# Patient Record
Sex: MALE | Race: WHITE | NOT HISPANIC OR LATINO | URBAN - METROPOLITAN AREA
[De-identification: names, ages, dates, MRNs, and addresses within clinical notes are randomized per-mention and may not be internally consistent; named-entity substitution may affect disease eponyms.]

---

## 2017-06-06 ENCOUNTER — OUTPATIENT (OUTPATIENT)
Dept: OUTPATIENT SERVICES | Facility: HOSPITAL | Age: 82
LOS: 1 days | Discharge: HOME | End: 2017-06-06

## 2017-06-06 DIAGNOSIS — R07.9 CHEST PAIN, UNSPECIFIED: ICD-10-CM

## 2017-06-06 DIAGNOSIS — E78.5 HYPERLIPIDEMIA, UNSPECIFIED: ICD-10-CM

## 2017-06-06 DIAGNOSIS — I51.9 HEART DISEASE, UNSPECIFIED: ICD-10-CM

## 2017-06-06 DIAGNOSIS — I10 ESSENTIAL (PRIMARY) HYPERTENSION: ICD-10-CM

## 2017-06-28 DIAGNOSIS — I25.10 ATHEROSCLEROTIC HEART DISEASE OF NATIVE CORONARY ARTERY WITHOUT ANGINA PECTORIS: ICD-10-CM

## 2017-06-28 DIAGNOSIS — H50.15 ALTERNATING EXOTROPIA: ICD-10-CM

## 2017-06-28 DIAGNOSIS — E78.00 PURE HYPERCHOLESTEROLEMIA, UNSPECIFIED: ICD-10-CM

## 2017-06-28 DIAGNOSIS — E03.9 HYPOTHYROIDISM, UNSPECIFIED: ICD-10-CM

## 2017-06-28 DIAGNOSIS — H50.21 VERTICAL STRABISMUS, RIGHT EYE: ICD-10-CM

## 2017-06-28 DIAGNOSIS — I10 ESSENTIAL (PRIMARY) HYPERTENSION: ICD-10-CM

## 2018-05-31 PROBLEM — Z00.00 ENCOUNTER FOR PREVENTIVE HEALTH EXAMINATION: Status: ACTIVE | Noted: 2018-05-31

## 2018-06-13 ENCOUNTER — APPOINTMENT (OUTPATIENT)
Dept: UROLOGY | Facility: CLINIC | Age: 83
End: 2018-06-13
Payer: MEDICARE

## 2018-06-13 VITALS
WEIGHT: 185 LBS | DIASTOLIC BLOOD PRESSURE: 70 MMHG | HEART RATE: 55 BPM | HEIGHT: 68 IN | SYSTOLIC BLOOD PRESSURE: 141 MMHG | BODY MASS INDEX: 28.04 KG/M2

## 2018-06-13 DIAGNOSIS — E07.9 DISORDER OF THYROID, UNSPECIFIED: ICD-10-CM

## 2018-06-13 DIAGNOSIS — E78.00 PURE HYPERCHOLESTEROLEMIA, UNSPECIFIED: ICD-10-CM

## 2018-06-13 DIAGNOSIS — Z78.9 OTHER SPECIFIED HEALTH STATUS: ICD-10-CM

## 2018-06-13 DIAGNOSIS — I10 ESSENTIAL (PRIMARY) HYPERTENSION: ICD-10-CM

## 2018-06-13 DIAGNOSIS — Z95.5 PRESENCE OF CORONARY ANGIOPLASTY IMPLANT AND GRAFT: ICD-10-CM

## 2018-06-13 DIAGNOSIS — Z82.49 FAMILY HISTORY OF ISCHEMIC HEART DISEASE AND OTHER DISEASES OF THE CIRCULATORY SYSTEM: ICD-10-CM

## 2018-06-13 PROCEDURE — 99204 OFFICE O/P NEW MOD 45 MIN: CPT

## 2018-09-13 ENCOUNTER — APPOINTMENT (OUTPATIENT)
Dept: UROLOGY | Facility: CLINIC | Age: 83
End: 2018-09-13
Payer: MEDICARE

## 2018-09-13 VITALS
HEIGHT: 68 IN | WEIGHT: 185 LBS | SYSTOLIC BLOOD PRESSURE: 136 MMHG | BODY MASS INDEX: 28.04 KG/M2 | HEART RATE: 59 BPM | DIASTOLIC BLOOD PRESSURE: 62 MMHG

## 2018-09-13 PROBLEM — Z78.9 NON-SMOKER: Status: ACTIVE | Noted: 2018-06-13

## 2018-09-13 PROBLEM — Z82.49 FAMILY HISTORY OF CARDIAC DISORDER: Status: ACTIVE | Noted: 2018-06-13

## 2018-09-13 PROCEDURE — 99213 OFFICE O/P EST LOW 20 MIN: CPT

## 2019-01-10 ENCOUNTER — APPOINTMENT (OUTPATIENT)
Dept: UROLOGY | Facility: CLINIC | Age: 84
End: 2019-01-10
Payer: MEDICARE

## 2019-01-10 VITALS
HEIGHT: 68 IN | HEART RATE: 51 BPM | WEIGHT: 185 LBS | SYSTOLIC BLOOD PRESSURE: 146 MMHG | BODY MASS INDEX: 28.04 KG/M2 | DIASTOLIC BLOOD PRESSURE: 75 MMHG

## 2019-01-10 LAB
BILIRUB UR QL STRIP: NORMAL
CLARITY UR: CLEAR
COLLECTION METHOD: NORMAL
GLUCOSE UR-MCNC: NORMAL
HCG UR QL: NORMAL EU/DL
HGB UR QL STRIP.AUTO: NORMAL
KETONES UR-MCNC: NORMAL
LEUKOCYTE ESTERASE UR QL STRIP: NORMAL
NITRITE UR QL STRIP: NORMAL
PH UR STRIP: 5
PROT UR STRIP-MCNC: NORMAL
SP GR UR STRIP: 1015

## 2019-01-10 PROCEDURE — 99214 OFFICE O/P EST MOD 30 MIN: CPT

## 2019-01-10 PROCEDURE — 81003 URINALYSIS AUTO W/O SCOPE: CPT | Mod: QW

## 2019-01-10 NOTE — REASON FOR VISIT
[Follow-up Visit ___] : a follow-up visit  for [unfilled] [Family Member] : family member [Source: ______] : History obtained from [unfilled]

## 2019-01-10 NOTE — PHYSICAL EXAM
[General Appearance - Well Developed] : well developed [General Appearance - Well Nourished] : well nourished [Normal Appearance] : normal appearance [Well Groomed] : well groomed [General Appearance - In No Acute Distress] : no acute distress [Abdomen Soft] : soft [Abdomen Tenderness] : non-tender [Costovertebral Angle Tenderness] : no ~M costovertebral angle tenderness [Urethral Meatus] : meatus normal [Scrotum] : the scrotum was normal [Testes Tenderness] : no tenderness of the testes [Skin Color & Pigmentation] : normal skin color and pigmentation [Edema] : no peripheral edema [] : no respiratory distress [Respiration, Rhythm And Depth] : normal respiratory rhythm and effort [Exaggerated Use Of Accessory Muscles For Inspiration] : no accessory muscle use [Oriented To Time, Place, And Person] : oriented to person, place, and time [Affect] : the affect was normal [Mood] : the mood was normal [Not Anxious] : not anxious [Normal Station and Gait] : the gait and station were normal for the patient's age [No Focal Deficits] : no focal deficits [No Palpable Adenopathy] : no palpable adenopathy

## 2019-01-10 NOTE — ASSESSMENT
[FreeTextEntry1] : 1. Prostate cancer T1C, john 6, diagnosed in 1996\par 2. biochemical recurrence on continuous ADT since 2006- LEXY\par \par -pt scheduled to go away to [place] this month and return in May- he will receive his lupron injection at that time and rto with PSA\par -we will decide at that time to cycle off\par

## 2019-01-10 NOTE — HISTORY OF PRESENT ILLNESS
[Nocturia] : nocturia [None] : None [FreeTextEntry1] : 86 y.o male with h/o prostate cancer ,here with daughter .\par dx'ed 1996- T1C, john 6, left lobe, PSA 9.0 at diagnosis\par pt treated with EBRT\par had biochemical recurrence in 2006 and started on Lupron depot 4 month- previously followed by Dr. Sanchez- per pts daughter "scans" were done and it did not show metastatic dz\par s/p  injection last week [facility]\par states he had a dexascan last year and gets annual cmp with Dr. Kay\par voids well/ no new complaints -- all data reviewed \par plays golf\par appetite good\par PSA \par           0.7  12/2018 \par           0.5  test 4-  9/5/18\par           0.6   12/2017 [Urinary Urgency] : no urinary urgency [Urinary Frequency] : no urinary frequency [Weak Stream] : no weak stream [Dysuria] : no dysuria [Hematuria - Gross] : no gross hematuria [Abdominal Pain] : no abdominal pain [Flank Pain] : no flank pain [Fever] : no fever [Anorexia] : no anorexia

## 2019-05-15 ENCOUNTER — APPOINTMENT (OUTPATIENT)
Dept: UROLOGY | Facility: CLINIC | Age: 84
End: 2019-05-15
Payer: MEDICARE

## 2019-05-15 PROCEDURE — 99213 OFFICE O/P EST LOW 20 MIN: CPT

## 2019-06-27 ENCOUNTER — INPATIENT (INPATIENT)
Facility: HOSPITAL | Age: 84
LOS: 7 days | Discharge: HOME HEALTH | End: 2019-07-05
Attending: THORACIC SURGERY (CARDIOTHORACIC VASCULAR SURGERY) | Admitting: THORACIC SURGERY (CARDIOTHORACIC VASCULAR SURGERY)
Payer: MEDICARE

## 2019-06-27 VITALS — HEIGHT: 67.99 IN | OXYGEN SATURATION: 99 % | WEIGHT: 184.97 LBS

## 2019-06-27 LAB
ABO RH CONFIRMATION: SIGNIFICANT CHANGE UP
ALBUMIN SERPL ELPH-MCNC: 3.2 G/DL — LOW (ref 3.5–5.2)
ALP SERPL-CCNC: 83 U/L — SIGNIFICANT CHANGE UP (ref 30–115)
ALT FLD-CCNC: 13 U/L — SIGNIFICANT CHANGE UP (ref 0–41)
ANION GAP SERPL CALC-SCNC: 10 MMOL/L — SIGNIFICANT CHANGE UP (ref 7–14)
APPEARANCE UR: CLEAR — SIGNIFICANT CHANGE UP
APTT BLD: 30.1 SEC — SIGNIFICANT CHANGE UP (ref 27–39.2)
APTT BLD: 62.8 SEC — HIGH (ref 27–39.2)
APTT BLD: >200 SEC — CRITICAL HIGH (ref 27–39.2)
AST SERPL-CCNC: 17 U/L — SIGNIFICANT CHANGE UP (ref 0–41)
BACTERIA # UR AUTO: ABNORMAL /HPF
BILIRUB SERPL-MCNC: 0.6 MG/DL — SIGNIFICANT CHANGE UP (ref 0.2–1.2)
BILIRUB UR-MCNC: NEGATIVE — SIGNIFICANT CHANGE UP
BLD GP AB SCN SERPL QL: SIGNIFICANT CHANGE UP
BUN SERPL-MCNC: 17 MG/DL — SIGNIFICANT CHANGE UP (ref 10–20)
CALCIUM SERPL-MCNC: 8.4 MG/DL — LOW (ref 8.5–10.1)
CHLORIDE SERPL-SCNC: 105 MMOL/L — SIGNIFICANT CHANGE UP (ref 98–110)
CK MB CFR SERPL CALC: 2.1 NG/ML — SIGNIFICANT CHANGE UP (ref 0.6–6.3)
CK SERPL-CCNC: 114 U/L — SIGNIFICANT CHANGE UP (ref 0–225)
CO2 SERPL-SCNC: 25 MMOL/L — SIGNIFICANT CHANGE UP (ref 17–32)
COLOR SPEC: YELLOW — SIGNIFICANT CHANGE UP
CREAT SERPL-MCNC: 1 MG/DL — SIGNIFICANT CHANGE UP (ref 0.7–1.5)
DIFF PNL FLD: ABNORMAL
ESTIMATED AVERAGE GLUCOSE: 134 MG/DL — HIGH (ref 68–114)
GLUCOSE SERPL-MCNC: 122 MG/DL — HIGH (ref 70–99)
GLUCOSE UR QL: NEGATIVE MG/DL — SIGNIFICANT CHANGE UP
HBA1C BLD-MCNC: 6.3 % — HIGH (ref 4–5.6)
HCT VFR BLD CALC: 32.8 % — LOW (ref 42–52)
HGB BLD-MCNC: 10.7 G/DL — LOW (ref 14–18)
INR BLD: 1.11 RATIO — SIGNIFICANT CHANGE UP (ref 0.65–1.3)
KETONES UR-MCNC: NEGATIVE — SIGNIFICANT CHANGE UP
LEUKOCYTE ESTERASE UR-ACNC: ABNORMAL
MCHC RBC-ENTMCNC: 29.6 PG — SIGNIFICANT CHANGE UP (ref 27–31)
MCHC RBC-ENTMCNC: 32.6 G/DL — SIGNIFICANT CHANGE UP (ref 32–37)
MCV RBC AUTO: 90.6 FL — SIGNIFICANT CHANGE UP (ref 80–94)
MRSA PCR RESULT.: NEGATIVE — SIGNIFICANT CHANGE UP
NITRITE UR-MCNC: NEGATIVE — SIGNIFICANT CHANGE UP
NRBC # BLD: 0 /100 WBCS — SIGNIFICANT CHANGE UP (ref 0–0)
NT-PROBNP SERPL-SCNC: 665 PG/ML — HIGH (ref 0–300)
PH UR: 7 — SIGNIFICANT CHANGE UP (ref 5–8)
PLATELET # BLD AUTO: 140 K/UL — SIGNIFICANT CHANGE UP (ref 130–400)
POTASSIUM SERPL-MCNC: 4.1 MMOL/L — SIGNIFICANT CHANGE UP (ref 3.5–5)
POTASSIUM SERPL-SCNC: 4.1 MMOL/L — SIGNIFICANT CHANGE UP (ref 3.5–5)
PROT SERPL-MCNC: 5.6 G/DL — LOW (ref 6–8)
PROT UR-MCNC: ABNORMAL MG/DL
PROTHROM AB SERPL-ACNC: 12.8 SEC — SIGNIFICANT CHANGE UP (ref 9.95–12.87)
RBC # BLD: 3.62 M/UL — LOW (ref 4.7–6.1)
RBC # FLD: 14.1 % — SIGNIFICANT CHANGE UP (ref 11.5–14.5)
RBC CASTS # UR COMP ASSIST: >50 /HPF
SODIUM SERPL-SCNC: 140 MMOL/L — SIGNIFICANT CHANGE UP (ref 135–146)
SP GR SPEC: 1.02 — SIGNIFICANT CHANGE UP (ref 1.01–1.03)
TROPONIN T SERPL-MCNC: <0.01 NG/ML — SIGNIFICANT CHANGE UP
TROPONIN T SERPL-MCNC: <0.01 NG/ML — SIGNIFICANT CHANGE UP
UROBILINOGEN FLD QL: 0.2 MG/DL — SIGNIFICANT CHANGE UP (ref 0.2–0.2)
WBC # BLD: 6.66 K/UL — SIGNIFICANT CHANGE UP (ref 4.8–10.8)
WBC # FLD AUTO: 6.66 K/UL — SIGNIFICANT CHANGE UP (ref 4.8–10.8)
WBC UR QL: ABNORMAL /HPF

## 2019-06-27 PROCEDURE — 93010 ELECTROCARDIOGRAM REPORT: CPT

## 2019-06-27 PROCEDURE — 93306 TTE W/DOPPLER COMPLETE: CPT | Mod: 26

## 2019-06-27 PROCEDURE — 93880 EXTRACRANIAL BILAT STUDY: CPT | Mod: 26

## 2019-06-27 PROCEDURE — 99222 1ST HOSP IP/OBS MODERATE 55: CPT | Mod: 57

## 2019-06-27 PROCEDURE — 33967 INSERT I-AORT PERCUT DEVICE: CPT

## 2019-06-27 PROCEDURE — 93458 L HRT ARTERY/VENTRICLE ANGIO: CPT | Mod: 26

## 2019-06-27 RX ORDER — CHLORHEXIDINE GLUCONATE 213 G/1000ML
15 SOLUTION TOPICAL ONCE
Refills: 0 | Status: COMPLETED | OUTPATIENT
Start: 2019-06-27 | End: 2019-06-28

## 2019-06-27 RX ORDER — OXYCODONE HYDROCHLORIDE 5 MG/1
5 TABLET ORAL EVERY 6 HOURS
Refills: 0 | Status: DISCONTINUED | OUTPATIENT
Start: 2019-06-27 | End: 2019-06-28

## 2019-06-27 RX ORDER — BACLOFEN 100 %
5 POWDER (GRAM) MISCELLANEOUS ONCE
Refills: 0 | Status: COMPLETED | OUTPATIENT
Start: 2019-06-27 | End: 2019-06-27

## 2019-06-27 RX ORDER — HEPARIN SODIUM 5000 [USP'U]/ML
1000 INJECTION INTRAVENOUS; SUBCUTANEOUS
Qty: 25000 | Refills: 0 | Status: DISCONTINUED | OUTPATIENT
Start: 2019-06-27 | End: 2019-06-28

## 2019-06-27 RX ORDER — NITROGLYCERIN 6.5 MG
5 CAPSULE, EXTENDED RELEASE ORAL
Qty: 50 | Refills: 0 | Status: DISCONTINUED | OUTPATIENT
Start: 2019-06-27 | End: 2019-06-28

## 2019-06-27 RX ORDER — ATORVASTATIN CALCIUM 80 MG/1
80 TABLET, FILM COATED ORAL AT BEDTIME
Refills: 0 | Status: DISCONTINUED | OUTPATIENT
Start: 2019-06-27 | End: 2019-06-28

## 2019-06-27 RX ORDER — ACETAMINOPHEN 500 MG
650 TABLET ORAL EVERY 6 HOURS
Refills: 0 | Status: DISCONTINUED | OUTPATIENT
Start: 2019-06-27 | End: 2019-06-28

## 2019-06-27 RX ORDER — SODIUM CHLORIDE 9 MG/ML
3 INJECTION INTRAMUSCULAR; INTRAVENOUS; SUBCUTANEOUS EVERY 8 HOURS
Refills: 0 | Status: DISCONTINUED | OUTPATIENT
Start: 2019-06-27 | End: 2019-06-28

## 2019-06-27 RX ORDER — METOPROLOL TARTRATE 50 MG
12.5 TABLET ORAL EVERY 12 HOURS
Refills: 0 | Status: DISCONTINUED | OUTPATIENT
Start: 2019-06-27 | End: 2019-06-28

## 2019-06-27 RX ORDER — ONDANSETRON 8 MG/1
4 TABLET, FILM COATED ORAL ONCE
Refills: 0 | Status: COMPLETED | OUTPATIENT
Start: 2019-06-27 | End: 2019-06-27

## 2019-06-27 RX ORDER — CHLORHEXIDINE GLUCONATE 213 G/1000ML
1 SOLUTION TOPICAL ONCE
Refills: 0 | Status: COMPLETED | OUTPATIENT
Start: 2019-06-27 | End: 2019-06-27

## 2019-06-27 RX ORDER — LEVOTHYROXINE SODIUM 125 MCG
50 TABLET ORAL DAILY
Refills: 0 | Status: DISCONTINUED | OUTPATIENT
Start: 2019-06-27 | End: 2019-06-28

## 2019-06-27 RX ORDER — SODIUM CHLORIDE 9 MG/ML
1000 INJECTION INTRAMUSCULAR; INTRAVENOUS; SUBCUTANEOUS
Refills: 0 | Status: DISCONTINUED | OUTPATIENT
Start: 2019-06-27 | End: 2019-06-29

## 2019-06-27 RX ORDER — PANTOPRAZOLE SODIUM 20 MG/1
40 TABLET, DELAYED RELEASE ORAL
Refills: 0 | Status: DISCONTINUED | OUTPATIENT
Start: 2019-06-27 | End: 2019-06-28

## 2019-06-27 RX ORDER — ASPIRIN/CALCIUM CARB/MAGNESIUM 324 MG
81 TABLET ORAL DAILY
Refills: 0 | Status: DISCONTINUED | OUTPATIENT
Start: 2019-06-27 | End: 2019-06-28

## 2019-06-27 RX ADMIN — SODIUM CHLORIDE 3 MILLILITER(S): 9 INJECTION INTRAMUSCULAR; INTRAVENOUS; SUBCUTANEOUS at 18:53

## 2019-06-27 RX ADMIN — OXYCODONE HYDROCHLORIDE 5 MILLIGRAM(S): 5 TABLET ORAL at 18:49

## 2019-06-27 RX ADMIN — Medication 5 MILLIGRAM(S): at 14:33

## 2019-06-27 RX ADMIN — Medication 12.5 MILLIGRAM(S): at 18:36

## 2019-06-27 RX ADMIN — OXYCODONE HYDROCHLORIDE 5 MILLIGRAM(S): 5 TABLET ORAL at 18:10

## 2019-06-27 RX ADMIN — Medication 650 MILLIGRAM(S): at 21:15

## 2019-06-27 RX ADMIN — SODIUM CHLORIDE 3 MILLILITER(S): 9 INJECTION INTRAMUSCULAR; INTRAVENOUS; SUBCUTANEOUS at 22:19

## 2019-06-27 RX ADMIN — Medication 650 MILLIGRAM(S): at 20:46

## 2019-06-27 RX ADMIN — ATORVASTATIN CALCIUM 80 MILLIGRAM(S): 80 TABLET, FILM COATED ORAL at 22:43

## 2019-06-27 RX ADMIN — CHLORHEXIDINE GLUCONATE 1 APPLICATION(S): 213 SOLUTION TOPICAL at 21:00

## 2019-06-27 RX ADMIN — Medication 1.5 MICROGRAM(S)/MIN: at 08:14

## 2019-06-27 RX ADMIN — ONDANSETRON 4 MILLIGRAM(S): 8 TABLET, FILM COATED ORAL at 18:45

## 2019-06-27 NOTE — PROGRESS NOTE ADULT - SUBJECTIVE AND OBJECTIVE BOX
Cardiac Surgery Pre-op Note: PLANNED OPERATIVE PROCEDURE(s):    CABG            HD # 1                      SURGEON(s):LEE Edmondson       Consult requesting by: Dr. Conteh  PMD: Dr. Kay  Cardiologist: Dr. Shaw   Urology: Dr. Carlisle    HISTORY OF PRESENT ILLNESS: 87 year old male nonsmoker with PMHx of CAD (MI 30ys ago), Prostate CA, HTN, HLD and hypothyroidism. Patient has been c/o progressively worsening SOB/FONTENOT associated with chest discomfort now with minimal exertion relieved by sublingual nitro which patient reports has been taking more frequently since January. Patient now presented for elective LHC. During procedure patient began c/o of chest pain and was controlled with nitroglycerine drip. Patient was found to have severe Left main 3vCAD with preserved left ventricular function and IABP was inserted for hemodynamic instability.     PAST MEDICAL & SURGICAL HISTORY:  Prostate cancer  Old myocardial infarction  Hypothyroid  Essential hypertension    SUBJECTIVE ASSESSMENT: 87y Male patient seen and examined at bedside. Patient currently hemodynamically table w/IABP in place.    Physical Exam:    Vital Signs Last 24 Hrs  T(F): Afebrile  HR: 60  BP: 118/80  RR: 14  SpO2: 99% 2l NC     RUE BP:                        LUE BP:    General: NAD; A&Ox3  Cardiac: S1/S2, RRR, no murmur, no rubs  Lungs: unlabored respirations, w/+ Bibasilar crackles, no wheeze, no rales, no crackles  Abdomen: Soft/NT/ND; positive bowel sounds x 4  Extremities: + venous stasis skin changes to b/l shins w/active venous stasis ulcer to anterior aspect of Left shin w/ 1-2+ pitting edema to b/l LEs. No evidence of cyanosis or deformity. 1+ b/l DP     5meter walk test: T1:      s/T2:     s/T3:     s: unable to assess 2/2 IABP          LABS:                        12.5<L>  6.87  )-----------( 179      ( 27 Jun 2019 07:02 )             38.8<L>    06-27    140  |  105  |  17  ----------------------------<  122<H>  4.1   |  25  |  1.0    Ca    8.4<L>      27 Jun 2019 12:28    TPro  5.6<L> [6.0 - 8.0]  /  Alb  3.2<L> [3.5 - 5.2]  /  TBili  0.6 [0.2 - 1.2]  /  DBili  x   /  AST  17 [0 - 41]  /  ALT  13 [0 - 41]  /  AlkPhos  83 [30 - 115]  06-27    PT/INR - ( 27 Jun 2019 12:37 )   PT: ;   INR: 1.11 ratio       PTT - ( 27 Jun 2019 12:37 )  PTT:62.8 sec    Blood Type: Type + Screen (06.27.19 @ 12:37)    ABO RH Interpretation: A POS    HGB A1C: Pending    Pro-BNP: Serum Pro-Brain Natriuretic Peptide: 665 pg/mL (06-27 @ 12:28)    Thyroid Panel: Pending    Urinalysis: Pending     MRSA: Pending     RADIOLOGY & ADDITIONAL TESTS:  CXR:     EKG: < from: 12 Lead ECG (06.27.19 @ 07:05) >  Ventricular Rate 72 BPM  Atrial Rate 72 BPM  P-R Interval 167 ms  QRS Duration 133 ms  Q-T Interval 446 ms  QTC Calculation(Bezet) 488 ms  P Axis 64 degrees  R Axis 83 degrees  T Axis 20 degrees  Diagnosis Line Normal sinus rhythmwith occasional Premature supraventricular complexes  Right bundle branch block  T wave abnormality, consider inferolateral ischemia  Abnormal ECG  Confirmed by Oscar Conteh ([id number]) on 6/27/2019 1:17:21 PM  < end of copied text >    Carotid Duplex:  < from: [facility] Duplex Baldo, Nilesh (06.27.19 @ 13:39) >  Impression:  40-59 % stenosis of the right internal carotid artery.  40-59 % stenosis of the left internal carotid artery.  < end of copied text >    PFT's: PENDING    Echocardiogram: < from: Transthoracic Echocardiogram (06.27.19 @ 13:23) >  Summary:   1. LV Ejection Fraction by Aranda's Method with a biplane EF of 68 %.   2. Mildly increased LV wall thickness.   3. Spectral Doppler shows pseudonormal pattern of left ventricular myocardial filling (Grade II diastolic dysfunction).   4. Thickening and calcification of the anterior and posterior mitral valve leaflets.   5. Sclerotic aortic valve with decreased opening.  < end of copied text >    Cardiac catheterization: Cardiac Cath: Left Heart Catheterization:  LVEDP: WNL  3 V CAD  Right radial 6 Fr sheath hemostasis with radial D stat  Right femoral 8 Fr IABP using micropuncture  Right dominant circulation                    Left main 70% ostial lesion  LAD:  Svere diffuse disease with total occlusion,                       Left Circumflex: Heavily calcified, severe diffuse disease  Right Coronary Artery:  of mid RCA  IMPLANTS:  8 Fr IABP for severe 3 V CAD and unstable angina      Allergies  No Known Allergies  Intolerances      MEDICATIONS  (STANDING):  aspirin enteric coated 81 milliGRAM(s) Oral daily  atorvastatin 80 milliGRAM(s) Oral at bedtime  chlorhexidine 0.12% Liquid 15 milliLiter(s) Swish and Spit once  chlorhexidine 4% Liquid 1 Application(s) Topical once  levothyroxine 50 MICROGram(s) Oral daily  metoprolol tartrate 12.5 milliGRAM(s) Oral every 12 hours  nitroglycerin  Infusion 5 MICROgram(s)/Min (1.5 mL/Hr) IV Continuous <Continuous>  pantoprazole    Tablet 40 milliGRAM(s) Oral before breakfast  sodium chloride 0.9% lock flush 3 milliLiter(s) IV Push every 8 hours    MEDICATIONS  (PRN):  oxyCODONE    IR 5 milliGRAM(s) Oral every 6 hours PRN Moderate Pain (4 - 6)    Home Medications:  aspirin 81 mg oral tablet: 1 tab(s) orally once a day (27 Jun 2019 07:30)  atorvastatin 20 mg oral tablet: 1 tab(s) orally once a day (27 Jun 2019 07:30)  isosorbide mononitrate 30 mg oral tablet, extended release: 1 tab(s) orally once a day (in the morning) (27 Jun 2019 07:30)  nitroglycerin 0.4 mg sublingual tablet: pt took in cath lab bathroom in cath lab (27 Jun 2019 07:30)  propranolol 60 mg oral capsule, extended release: 1 cap(s) orally once a day (27 Jun 2019 07:30)  Synthroid 50 mcg (0.05 mg) oral tablet: 1 tab(s) orally once a day (27 Jun 2019 07:30)      Pre-op ACEi/ARB/CCB held 24 hours prior to planned procedure: [] Yes, [x] NO: indication:  Pre-Op Beta-Blockers: [x]Yes, []No: contraindication:  Pre-Op Aspirin: [x]Yes,  []No: contraindication: [] Held for Pre-op cardiac valve surgery with no CAD  Pre-Op Statin: [x]Yes, []No: contraindication:      STS Score:    Cardiac Surgery Risk Factors  CVA and/or carotid/cerebrovascular disease. Yes  No  Explain if Yes  Aortoiliac disease Yes  No  Explain if Yes  Previous MI Yes: </=30 years ago in PMHx  Previous Cardiac Surgery Yes  No  Explain if Yes  Hemodynamics-Unstable or Shock Yes: IABP in place No  Explain if Yes  Diabetes Yes  No  Explain if Yes  Hepatic Failure Yes  No  Explain if Yes  Renal failure and/or dialysis Yes  No  Explain if Yes  Heart failure: Acute diastolic heart failure: SOB w/+Bibasilar crackles  COPD Yes  No  Explain if Yes  Immune System Deficiency Yes  No  Explain if Yes  Malignant Ventricular Arrhythmia Yes  No  Explain if Yes  PVD: Yes: venous stasis and venous stasis ulcers of b/l LEs    Assessment/Plan:  87y Male Pre-op for   - Case and plan discussed with CTU Intensivist and CT Surgeon - Dr. Anderson/Casandra/Debo   - Continue CTU supportive care and ongoing plan of care as per continuing CTU rounds.    - Continue DVT/GI prophylaxis  - Incentive Spirometry 10 times an hour  - Continue to advance physical activity as tolerated and continue PT/OT as directed  1. CAD: Continue ASA, statin, BB  2. HTN:   3. A. Fib:   4. COPD/Hypoxia:   5. DM/Glucose Control:     Pt has AICD/PPM [ ] Yes  [x ] No             Brand Name:  Pre-op Beta Blocker ordered within 24 hrs of surgery (CABG ONLY)?  [x ] Yes  [ ] No  If not, Why?  Type & Cross  [ ] Yes  [ ] No  NPO after Midnight [x ] Yes  [ ] No  Pre-op ABX ordered, to be taped on chart:  [ x] Yes  [ ] No     Hibiclens/Peridex ordered [x ] Yes  [ ] No  Intraop on Hold: PRBCs, CXR, JOSUÉ [x ]   Consent obtained  [x ] Yes  [ ] No Cardiac Surgery Pre-op Note: PLANNED OPERATIVE PROCEDURE(s):    CABG            HD # 1                      SURGEON(s):LEE Edmondson     Consult requesting by: Dr. Conteh  PMD: Dr. Kay  Cardiologist: Dr. Shaw   Urology: Dr. Carlisle    HISTORY OF PRESENT ILLNESS: 87 year old male nonsmoker with PMHx of CAD (MI 30ys ago), Prostate CA, HTN, HLD and hypothyroidism. Patient has been c/o progressively worsening SOB/FONTENOT associated with chest discomfort now with minimal exertion relieved by sublingual nitro which patient reports has been taking more frequently since January. Patient now presented for elective LHC. During procedure patient began c/o of chest pain and was controlled with nitroglycerine drip. Patient was found to have severe Left main 3vCAD with preserved left ventricular function and IABP was inserted for hemodynamic instability.     PAST MEDICAL & SURGICAL HISTORY:  Prostate cancer  Old myocardial infarction  Hypothyroid  Essential hypertension    SUBJECTIVE ASSESSMENT: 87y Male patient seen and examined at bedside. Patient currently hemodynamically table w/IABP in place.    Physical Exam:    Vital Signs Last 24 Hrs  T(F): Afebrile  HR: 60  BP: 118/80  RR: 14  SpO2: 99% 2l NC     RUE BP: 150/63                       LUE BP: 153/60    General: NAD; A&Ox3  Cardiac: S1/S2, RRR, no murmur, no rubs  Lungs: unlabored respirations, w/+ Bibasilar crackles, no wheeze, no rales, no crackles  Abdomen: Soft/NT/ND; positive bowel sounds x 4  Extremities: + venous stasis skin changes to b/l shins w/active venous stasis ulcer to anterior aspect of Left shin w/ 1-2+ pitting edema to b/l LEs. No evidence of cyanosis or deformity. 1+ b/l DP     5meter walk test: T1:      s/T2:     s/T3:     s: unable to assess 2/2 IABP          LABS:                        12.5<L>  6.87  )-----------( 179      ( 27 Jun 2019 07:02 )             38.8<L>    06-27    140  |  105  |  17  ----------------------------<  122<H>  4.1   |  25  |  1.0    Ca    8.4<L>      27 Jun 2019 12:28    TPro  5.6<L> [6.0 - 8.0]  /  Alb  3.2<L> [3.5 - 5.2]  /  TBili  0.6 [0.2 - 1.2]  /  DBili  x   /  AST  17 [0 - 41]  /  ALT  13 [0 - 41]  /  AlkPhos  83 [30 - 115]  06-27    PT/INR - ( 27 Jun 2019 12:37 )   PT: ;   INR: 1.11 ratio       PTT - ( 27 Jun 2019 12:37 )  PTT:62.8 sec    Blood Type: Type + Screen (06.27.19 @ 12:37)    ABO RH Interpretation: A POS    HGB A1C: Pending    Pro-BNP: Serum Pro-Brain Natriuretic Peptide: 665 pg/mL (06-27 @ 12:28)    Thyroid Panel: Pending    Urinalysis: Pending     MRSA: Pending     RADIOLOGY & ADDITIONAL TESTS:  CXR:     EKG: < from: 12 Lead ECG (06.27.19 @ 07:05) >  Ventricular Rate 72 BPM  Atrial Rate 72 BPM  P-R Interval 167 ms  QRS Duration 133 ms  Q-T Interval 446 ms  QTC Calculation(Bezet) 488 ms  P Axis 64 degrees  R Axis 83 degrees  T Axis 20 degrees  Diagnosis Line Normal sinus rhythmwith occasional Premature supraventricular complexes  Right bundle branch block  T wave abnormality, consider inferolateral ischemia  Abnormal ECG  Confirmed by Oscar Conteh ([id number]) on 6/27/2019 1:17:21 PM  < end of copied text >    Carotid Duplex:  < from: [facility] Duplex Carotid, Bilat (06.27.19 @ 13:39) >  Impression:  40-59 % stenosis of the right internal carotid artery.  40-59 % stenosis of the left internal carotid artery.  < end of copied text >    PFT's: PENDING    Echocardiogram: < from: Transthoracic Echocardiogram (06.27.19 @ 13:23) >  Summary:   1. LV Ejection Fraction by Aranda's Method with a biplane EF of 68 %.   2. Mildly increased LV wall thickness.   3. Spectral Doppler shows pseudonormal pattern of left ventricular myocardial filling (Grade II diastolic dysfunction).   4. Thickening and calcification of the anterior and posterior mitral valve leaflets.   5. Sclerotic aortic valve with decreased opening.  < end of copied text >    Cardiac catheterization: Cardiac Cath: Left Heart Catheterization:  LVEDP: WNL  3 V CAD  Right radial 6 Fr sheath hemostasis with radial D stat  Right femoral 8 Fr IABP using micropuncture  Right dominant circulation                    Left main 70% ostial lesion  LAD:  Svere diffuse disease with total occlusion,                       Left Circumflex: Heavily calcified, severe diffuse disease  Right Coronary Artery:  of mid RCA  IMPLANTS:  8 Fr IABP for severe 3 V CAD and unstable angina      Allergies  No Known Allergies  Intolerances      MEDICATIONS  (STANDING):  aspirin enteric coated 81 milliGRAM(s) Oral daily  atorvastatin 80 milliGRAM(s) Oral at bedtime  chlorhexidine 0.12% Liquid 15 milliLiter(s) Swish and Spit once  chlorhexidine 4% Liquid 1 Application(s) Topical once  levothyroxine 50 MICROGram(s) Oral daily  metoprolol tartrate 12.5 milliGRAM(s) Oral every 12 hours  nitroglycerin  Infusion 5 MICROgram(s)/Min (1.5 mL/Hr) IV Continuous <Continuous>  pantoprazole    Tablet 40 milliGRAM(s) Oral before breakfast  sodium chloride 0.9% lock flush 3 milliLiter(s) IV Push every 8 hours    MEDICATIONS  (PRN):  oxyCODONE    IR 5 milliGRAM(s) Oral every 6 hours PRN Moderate Pain (4 - 6)    Home Medications:  aspirin 81 mg oral tablet: 1 tab(s) orally once a day (27 Jun 2019 07:30)  atorvastatin 20 mg oral tablet: 1 tab(s) orally once a day (27 Jun 2019 07:30)  isosorbide mononitrate 30 mg oral tablet, extended release: 1 tab(s) orally once a day (in the morning) (27 Jun 2019 07:30)  nitroglycerin 0.4 mg sublingual tablet: pt took in cath lab bathroom in cath lab (27 Jun 2019 07:30)  propranolol 60 mg oral capsule, extended release: 1 cap(s) orally once a day (27 Jun 2019 07:30)  Synthroid 50 mcg (0.05 mg) oral tablet: 1 tab(s) orally once a day (27 Jun 2019 07:30)      Pre-op ACEi/ARB/CCB held 24 hours prior to planned procedure: [] Yes, [x] NO: indication:  Pre-Op Beta-Blockers: [x]Yes, []No: contraindication:  Pre-Op Aspirin: [x]Yes,  []No: contraindication: [] Held for Pre-op cardiac valve surgery with no CAD  Pre-Op Statin: [x]Yes, []No: contraindication:      STS Score: Isolated CAB   Risk of Mortality: 4.235%  Renal Failure: 2.746%  Permanent Stroke: 0.909%  Prolonged Ventilation: 21.812%  DSW Infection: 0.204%  Reoperation: 4.092%  Morbidity or Mortality: 29.564%  Short Length of Stay: 18.513%  Long Length of Stay: 8.893%      Cardiac Surgery Risk Factors  CVA and/or carotid/cerebrovascular disease. Yes  No  Explain if Yes  Aortoiliac disease Yes  No  Explain if Yes  Previous MI Yes: </=30 years ago in PMHx  Previous Cardiac Surgery Yes  No  Explain if Yes  Hemodynamics-Unstable or Shock Yes: IABP in place No  Explain if Yes  Diabetes Yes  No  Explain if Yes  Hepatic Failure Yes  No  Explain if Yes  Renal failure and/or dialysis Yes  No  Explain if Yes  Heart failure: Acute on chronic diastolic heart failure: SOB w/+Bibasilar crackles  COPD Yes  No  Explain if Yes  Immune System Deficiency Yes  No  Explain if Yes  Malignant Ventricular Arrhythmia Yes  No  Explain if Yes  PVD: Yes: venous stasis and venous stasis ulcers of b/l LEs    Assessment/Plan:  87y Male Pre-op for   - Case and plan discussed with CTU Intensivist and CT Surgeon - Dr. Edmondson   - Continue CTU supportive care and ongoing plan of care as per continuing CTU rounds.    - Continue DVT/GI prophylaxis  - Incentive Spirometry 10 times an hour  - Continue to advance physical activity as tolerated and continue PT/OT as directed  1. CAD: Continue ASA, statin, BB    Pt has AICD/PPM [ ] Yes  [x ] No             Brand Name:  Pre-op Beta Blocker ordered within 24 hrs of surgery ([facility] ONLY)?  [x ] Yes  [ ] No  If not, Why?  Type & Cross  [x ] Yes  [ ] No  NPO after Midnight [x ] Yes  [ ] No  Pre-op ABX ordered, to be taped on chart:  [ x] Yes  [ ] No     Hibiclens/Peridex ordered [x ] Yes  [ ] No  Intraop on Hold: PRBCs, CXR, JOSUÉ [x ]   Consent obtained  [x ] Yes  [ ] No

## 2019-06-27 NOTE — CONSULT NOTE ADULT - SUBJECTIVE AND OBJECTIVE BOX
Surgeon: Dr. Edmondson    Consult requesting by: Dr. Conteh  PMD: Dr. Kay  Cardiologist: Dr. Shaw   Urology: Dr. Carlisle      HISTORY OF PRESENT ILLNESS: 87 year old male nonsmoker with PMHx of CAD (MI 30ys ago), Prostate CA, HTN, HLD and hypothyroidism. Patient has been c/o progressively worsening FONTENOT associated with chest discomfort now with minimal exertion relieved by sublingual nitro which patient reports has been taking more frequently since January. Patient now presented for elective LHC. During procedure patient began c/o of chest pain and was controlled with nitroglycerine drip. Patient was found to have severe 3vCAD with preserved left ventricular function and IABP was inserted for hemodynamic instability.       NYHA functional class    [ ] Class I (no limitation) [ ] Class II (slight limitation) [x] Class III (marked limitation) [ ] Class IV (symptoms at rest)    PAST MEDICAL & SURGICAL HISTORY:  Prostate cancer  Old myocardial infarction  Hypothyroid  Essential hypertension      MEDICATIONS  (STANDING):  aspirin enteric coated 81 milliGRAM(s) Oral daily  atorvastatin 80 milliGRAM(s) Oral at bedtime  chlorhexidine 0.12% Liquid 15 milliLiter(s) Swish and Spit once  chlorhexidine 4% Liquid 1 Application(s) Topical once  levothyroxine 50 MICROGram(s) Oral daily  metoprolol tartrate 12.5 milliGRAM(s) Oral every 12 hours  nitroglycerin  Infusion 5 MICROgram(s)/Min (1.5 mL/Hr) IV Continuous <Continuous>  pantoprazole    Tablet 40 milliGRAM(s) Oral before breakfast  sodium chloride 0.9% lock flush 3 milliLiter(s) IV Push every 8 hours      Antiplatelet therapy: n/a                           Last dose/amt:      Allergies  No Known Allergies  Intolerances      SOCIAL HISTORY:  Smoker: [ ] Yes  [x ] No        PACK YEARS:                         WHEN QUIT?  ETOH use: [ ] Yes  [x ] No              FREQUENCY / QUANTITY:  Illicit Drug use:  [ ] Yes  [x ] No  Occupation: retired [employer]  Lives with: wife   Assisted device use: n/a  5 meter walk test: 1____sec, 2____sec, 3___sec: unable to assess 2/2 IABP  FAMILY HISTORY: n/a      Review of Systems  CONSTITUTIONAL:  Fevers[ ] chills[ ] sweats[ ] fatigue[x ] weight loss[ ] weight gain [ ]                                       NEURO:  paresthesias[ ] seizures [ ]  syncope [ ]  confusion [ ]                                                                                NEGATIVE[ X]   EYES: glasses[ ]  blurry vision[ ]  discharge[ ] pain[ ] glaucoma [ ]                                                                          NEGATIVE[X ]   ENMT:  difficulty hearing [ ]  vertigo[ ]  dysphagia[ ] epistaxis[ ] recent dental work [ ]                                    NEGATIVE[ X]   CV:  chest pain[x ] palpitations[ ] FONTENOT [ x] diaphoresis [ ]                                                                                              RESPIRATORY:  wheezing[ ] SOB[ x] cough [ ] sputum[ ] hemoptysis[ ]                                                                  GI:  nausea[ ]  vomiting [ ]  diarrhea[ ] constipation [ ] melena [ ]                                                                         NEGATIVE[ X]   : hematuria[ ]  dysuria[ ] urgency[ ] incontinence[ ]                                                                                            NEGATIVE[ X]   MUSKULOSKELETAL:  arthritis[ ]  joint swelling [ ] muscle weakness [ ] Hx vein stripping [ ]                             NEGATIVE[X ]   SKIN/BREAST:  rash[ ] itching [ ]  hair loss[ ] masses[ ]                                                                                              NEGATIVE[ X]   PSYCH:  dementia [ ] depression [ ] anxiety[ ]                                                                                                               NEGATIVE[X ]   HEME/LYMPH:  bruises easily[ ] enlarged lymph nodes[ ] tender lymph nodes[ ]                                               NEGATIVE[ X]   ENDOCRINE:  cold intolerance[ ] heat intolerance[ ] polydipsia[ ]                                                                          NEGATIVE[ X]     PHYSICAL EXAM  Vital Signs Last 24 Hrs  T(F): Afebrile  HR: 60  BP: 118/80  RR: 14  SpO2: 99% 2l NC       CONSTITUTIONAL:  WNL[x ]   Neuro: WNL [x ] Normal exam oriented to person/place & time with no focal motor or sensory  deficits. Other                     Eyes:    WNL [ x] Normal exam of conjunctiva & lids, pupils equally reactive. Other     ENT:     WNL [x ] Normal exam of nasal/oral mucosa with absence of cyanosis. Other  Neck:   WNL [x ] Normal exam of jugular veins, trachea & thyroid. Other  Chest:  WNL [x ] Normal lung exam with good air movement absence of wheezes, rales, or rhonchi: Other                                                                                CV:  Auscultation: normal [x ] S3[ ] S4[ ] Irregular [ ] Rub[ ] Clicks[ ]    Murmurs none:[x ]systolic [ ]  diastolic [ ] holosystolic [ ]  Carotids: No Bruitsx[ ] Other                 Abdominal Aorta: normal [ ] nonpalpable[ ]Other                                                                                      GI:           WNL[x ] Normal exam of abdomen, liver & spleen with no noted masses or tenderness. Other                                                                                                        Extremities: + venous stasis skin changes to b/l shins w/active venous stasis ulcer to anterior aspect of Left shin w/ 1-2+ pitting edema to b/l LEs. No evidence of cyanosis or deformity.   Lower Extremity Pulses: 1+ b/l DP   SKIN: WNL[x] Normal exam to inspection & palpation. Other:                                                          LABS:                        12.5   6.87  )-----------( 179      ( 27 Jun 2019 07:02 )             38.8     06-27    140  |  105  |  17  ----------------------------<  122<H>  4.1   |  25  |  1.0    Ca    8.4<L>      27 Jun 2019 12:28    TPro  5.6<L>  /  Alb  3.2<L>  /  TBili  0.6  /  DBili  x   /  AST  17  /  ALT  13  /  AlkPhos  83  06-27    PT/INR - ( 27 Jun 2019 12:37 )   PT: 12.80 sec;   INR: 1.11 ratio       PTT - ( 27 Jun 2019 12:37 )  PTT:62.8 sec    CARDIAC MARKERS ( 27 Jun 2019 12:28 )  x     / <0.01 ng/mL / x     / x     / x          Cardiac Cath: Left Heart Catheterization:  LVEDP: WNL  3 V CAD  Right radial 6 Fr sheath hemostasis with radial D stat  Right femoral 8 Fr IABP using micropuncture  Right dominant circulation                          Left main 70% ostial lesion  LAD:  Svere diffuse disease with total occlusion,                       Left Circumflex: Heavily calcified, severe diffuse disease  Right Coronary Artery:  of mid RCA    IMPLANTS:  8 Fr IABP for severe 3 V CAD and unstable angina      TTE: PENDING REPORT     Carotid duplex: PENDING REPORT     Simple PFTS: PENDING       STS Score: Isolated CAB  Risk of Mortality: 3.290%  Renal Failure: 2.150%  Permanent Stroke: 0.801%  Prolonged Ventilation: 17.551%  DSW Infection: 0.166%  Reoperation: 3.744%  Morbidity or Mortality: 24.696%  Short Length of Stay: 22.356%  Long Length of Stay: 6.938%    Impression:  CAD [ x]  Valvular  disease [ ]   Aortic Disease [ ]   VANCE: Yes[ ] No [ ]   CKD Stage I [ ] , Stage II [ ] , Stage III [ ], Stage IV [ ]   Anemia: Yes [ ], No [ ]  Diabetes :Yes [ ], No [ ]  Acute MI: Yes [ ], No [ ]   Heart Failure: Yes [ ] , No [ ] HFpEF [ ], HFrEF [ ]      Assessment/ Plan: 87 year old male nonsmoker with PMHx of CAD (MI 30ys ago), Prostate CA, HTN, HLD and hypothyroidism. Patient has been c/o progressively worsening FONTENOT associated with chest discomfort now with minimal exertion relieved by sublingual nitro which patient reports has been taking more frequently since January. Patient now presented for elective LHC. During procedure patient began c/o of chest pain and was controlled with nitroglycerine drip. Patient was found to have severe 3vCAD with preserved left ventricular function and IABP was inserted for hemodynamic instability.     -Cases and plan discussed with CT surgeon Dr. Edmondson. Initial STS risk assessed and discussed with patient. Evaluation by full heart team pending. Attending note to follow. Pre-op for: possible CABG    Recommendations:  [] hold Plavix  [] hold ASA if Pre-op Cardiac Valve surgery and patient without CAD  [x] hold ACEI/ARB/CCB 24 hours prior to planned procedure   [] LUE/RUE precaution for possible radial artery harvest      Labs:  [x] CBC  [x] CMP  [x] PT/INR/PTT  [x] BNP  [x] HgA1c  [x] Type and screen  [x] Urinalysis  [x] MRSA    Diagnostic studies  [] CT HEAD Nonn-Contrast  [] CT Chest with /without contrast   [x] Carotid Duplex  [] PFT: Simple PFT [x ]  Full [ ]  [] MAGALY/PVR  [x] TTE    Consultations/Evaluations   [] Renal Consult  [] Pulmonary Consult  [] Vascular Consult  [] Dental Consult   [] Hem-Onc Consult   [] GI Consult   [] Other Consultations : Surgeon: Dr. Edmondson    Consult requesting by: Dr. Conteh  PMD: Dr. Kay  Cardiologist: Dr. Shaw   Urology: Dr. Carlisle      HISTORY OF PRESENT ILLNESS: 87 year old male nonsmoker with PMHx of CAD (MI 30ys ago), Prostate CA, HTN, HLD and hypothyroidism. Patient has been c/o progressively worsening FONTENOT associated with chest discomfort now with minimal exertion relieved by sublingual nitro which patient reports has been taking more frequently since January. Patient now presented for elective LHC. During procedure patient began c/o of chest pain and was controlled with nitroglycerine drip. Patient was found to have severe Left main 3vCAD with preserved left ventricular function and IABP was inserted for hemodynamic instability.       NYHA functional class    [ ] Class I (no limitation) [ ] Class II (slight limitation) [x] Class III (marked limitation) [ ] Class IV (symptoms at rest)    PAST MEDICAL & SURGICAL HISTORY:  Prostate cancer  Old myocardial infarction  Hypothyroid  Essential hypertension      MEDICATIONS  (STANDING):  aspirin enteric coated 81 milliGRAM(s) Oral daily  atorvastatin 80 milliGRAM(s) Oral at bedtime  chlorhexidine 0.12% Liquid 15 milliLiter(s) Swish and Spit once  chlorhexidine 4% Liquid 1 Application(s) Topical once  levothyroxine 50 MICROGram(s) Oral daily  metoprolol tartrate 12.5 milliGRAM(s) Oral every 12 hours  nitroglycerin  Infusion 5 MICROgram(s)/Min (1.5 mL/Hr) IV Continuous <Continuous>  pantoprazole    Tablet 40 milliGRAM(s) Oral before breakfast  sodium chloride 0.9% lock flush 3 milliLiter(s) IV Push every 8 hours      Antiplatelet therapy: n/a                           Last dose/amt:      Allergies  No Known Allergies  Intolerances      SOCIAL HISTORY:  Smoker: [ ] Yes  [x ] No        PACK YEARS:                         WHEN QUIT?  ETOH use: [ ] Yes  [x ] No              FREQUENCY / QUANTITY:  Illicit Drug use:  [ ] Yes  [x ] No  Occupation: retired [employer]  Lives with: wife   Assisted device use: n/a  5 meter walk test: 1____sec, 2____sec, 3___sec: unable to assess 2/2 IABP  FAMILY HISTORY: n/a      Review of Systems  CONSTITUTIONAL:  Fevers[ ] chills[ ] sweats[ ] fatigue[x ] weight loss[ ] weight gain [ ]                                       NEURO:  paresthesias[ ] seizures [ ]  syncope [ ]  confusion [ ]                                                                                NEGATIVE[ X]   EYES: glasses[ ]  blurry vision[ ]  discharge[ ] pain[ ] glaucoma [ ]                                                                          NEGATIVE[X ]   ENMT:  difficulty hearing [ ]  vertigo[ ]  dysphagia[ ] epistaxis[ ] recent dental work [ ]                                    NEGATIVE[ X]   CV:  chest pain[x ] palpitations[ ] FONTENOT [ x] diaphoresis [ ]                                                                                              RESPIRATORY:  wheezing[ ] SOB[ x] cough [ ] sputum[ ] hemoptysis[ ]                                                                  GI:  nausea[ ]  vomiting [ ]  diarrhea[ ] constipation [ ] melena [ ]                                                                         NEGATIVE[ X]   : hematuria[ ]  dysuria[ ] urgency[ ] incontinence[ ]                                                                                            NEGATIVE[ X]   MUSKULOSKELETAL:  arthritis[ ]  joint swelling [ ] muscle weakness [ ] Hx vein stripping [ ]                             NEGATIVE[X ]   SKIN/BREAST:  rash[ ] itching [ ]  hair loss[ ] masses[ ]                                                                                              NEGATIVE[ X]   PSYCH:  dementia [ ] depression [ ] anxiety[ ]                                                                                                               NEGATIVE[X ]   HEME/LYMPH:  bruises easily[ ] enlarged lymph nodes[ ] tender lymph nodes[ ]                                               NEGATIVE[ X]   ENDOCRINE:  cold intolerance[ ] heat intolerance[ ] polydipsia[ ]                                                                          NEGATIVE[ X]     PHYSICAL EXAM  Vital Signs Last 24 Hrs  T(F): Afebrile  HR: 60  BP: 118/80  RR: 14  SpO2: 99% 2l NC       CONSTITUTIONAL:  WNL[x ]   Neuro: WNL [x ] Normal exam oriented to person/place & time with no focal motor or sensory  deficits. Other                     Eyes:    WNL [ x] Normal exam of conjunctiva & lids, pupils equally reactive. Other     ENT:     WNL [x ] Normal exam of nasal/oral mucosa with absence of cyanosis. Other  Neck:   WNL [x ] Normal exam of jugular veins, trachea & thyroid. Other  Chest:  WNL [x ] Normal lung exam with good air movement absence of wheezes, rales, or rhonchi: Other                                                                                CV:  Auscultation: normal [x ] S3[ ] S4[ ] Irregular [ ] Rub[ ] Clicks[ ]    Murmurs none:[x ]systolic [ ]  diastolic [ ] holosystolic [ ]  Carotids: No Bruitsx[ ] Other                 Abdominal Aorta: normal [ ] nonpalpable[ ]Other                                                                                      GI:           WNL[x ] Normal exam of abdomen, liver & spleen with no noted masses or tenderness. Other                                                                                                        Extremities: + venous stasis skin changes to b/l shins w/active venous stasis ulcer to anterior aspect of Left shin w/ 1-2+ pitting edema to b/l LEs. No evidence of cyanosis or deformity.   Lower Extremity Pulses: 1+ b/l DP   SKIN: WNL[x] Normal exam to inspection & palpation. Other:                                                          LABS:                        12.5   6.87  )-----------( 179      ( 27 Jun 2019 07:02 )             38.8     06-27    140  |  105  |  17  ----------------------------<  122<H>  4.1   |  25  |  1.0    Ca    8.4<L>      27 Jun 2019 12:28    TPro  5.6<L>  /  Alb  3.2<L>  /  TBili  0.6  /  DBili  x   /  AST  17  /  ALT  13  /  AlkPhos  83  06-27    PT/INR - ( 27 Jun 2019 12:37 )   PT: 12.80 sec;   INR: 1.11 ratio       PTT - ( 27 Jun 2019 12:37 )  PTT:62.8 sec    CARDIAC MARKERS ( 27 Jun 2019 12:28 )  x     / <0.01 ng/mL / x     / x     / x          Cardiac Cath: Left Heart Catheterization:  LVEDP: WNL  3 V CAD  Right radial 6 Fr sheath hemostasis with radial D stat  Right femoral 8 Fr IABP using micropuncture  Right dominant circulation                          Left main 70% ostial lesion  LAD:  Svere diffuse disease with total occlusion,                       Left Circumflex: Heavily calcified, severe diffuse disease  Right Coronary Artery:  of mid RCA    IMPLANTS:  8 Fr IABP for severe 3 V CAD and unstable angina      TTE: PENDING REPORT     Carotid duplex: PENDING REPORT     Simple PFTS: PENDING       STS Score: Isolated CAB  Risk of Mortality: 3.600%  Renal Failure: 2.150%  Permanent Stroke: 0.801%  Prolonged Ventilation:18.334%  DSW Infection:0.166%  Reoperation:3.573%  Morbidity or Mortality:25.534%  Short Length of Stay:22.356%  Long Length of Stay:6.938%    Impression:  CAD [ x]  Valvular  disease [ ]   Aortic Disease [ ]   VANCE: Yes[ ] No [ ]   CKD Stage I [ ] , Stage II [ ] , Stage III [ ], Stage IV [ ]   Anemia: Yes [ ], No [ ]  Diabetes :Yes [ ], No [ ]  Acute MI: Yes [ ], No [ ]   Heart Failure: Yes [ ] , No [ ] HFpEF [ ], HFrEF [ ]      Assessment/ Plan: 87 year old male nonsmoker with PMHx of CAD (MI 30ys ago), Prostate CA, HTN, HLD and hypothyroidism. Patient has been c/o progressively worsening FONTENOT associated with chest discomfort now with minimal exertion relieved by sublingual nitro which patient reports has been taking more frequently since January. Patient now presented for elective LHC. During procedure patient began c/o of chest pain and was controlled with nitroglycerine drip. Patient was found to have severe  Left main 3vCAD with preserved left ventricular function and IABP was inserted for hemodynamic instability.     -Cases and plan discussed with CT surgeon Dr. Edmondson. Initial STS risk assessed and discussed with patient. Evaluation by full heart team pending. Attending note to follow. Pre-op for: possible CABG    Recommendations:  [] hold Plavix  [] hold ASA if Pre-op Cardiac Valve surgery and patient without CAD  [x] hold ACEI/ARB/CCB 24 hours prior to planned procedure   [] LUE/RUE precaution for possible radial artery harvest      Labs:  [x] CBC  [x] CMP  [x] PT/INR/PTT  [x] BNP  [x] HgA1c  [x] Type and screen  [x] Urinalysis  [x] MRSA    Diagnostic studies  [] CT HEAD Nonn-Contrast  [] CT Chest with /without contrast   [x] Carotid Duplex  [] PFT: Simple PFT [x ]  Full [ ]  [] MAGALY/PVR  [x] TTE    Consultations/Evaluations   [] Renal Consult  [] Pulmonary Consult  [] Vascular Consult  [] Dental Consult   [] Hem-Onc Consult   [] GI Consult   [] Other Consultations :

## 2019-06-27 NOTE — PRE-ANESTHESIA EVALUATION ADULT - NSRADCARDRESULTSFT_GEN_ALL_CORE
US carotids: BL 40-59% stenosis   EKG: RBBB, ST depressions II, III, aVF, V3,4 US carotids: BL 40-59% stenosis   EKG: RBBB, ST depressions II, III, aVF, V3,4  ECHO: 1. LV Ejection Fraction by Aranda's Method with a biplane EF of 68 %.   2. Mildly increased LV wall thickness.   3. Spectral Doppler shows pseudonormal pattern of left ventricular   myocardial filling (Grade II diastolic dysfunction).   4. Thickening and calcification of the anterior and posterior mitral   valve leaflets.   5. Sclerotic aortic valve with decreased opening.

## 2019-06-27 NOTE — CONSULT NOTE ADULT - ATTENDING COMMENTS
87 year old male with history of hypertension, hyperlipidemia, MI (30 years ago), prostate cancer, hypothyroidism, presented with worsening dyspnea on exertion and chest discomfort which would resolve with sublingual nitroglycerin. Patient underwent an elective LHC and diagnosed with severe TVD. Patient became ischemic on the catheterization table and IABP was inserted. CT surgery was asked to evaluate patient for surgical revascularization.     Patient underwent preoperative workup. LHC performed on 6/27/2019  revealed total occlusion of LAD with left to left collaterals, 95% proximal circumflex stenosis, 95% distal circumflex stenosis, ostial 70% RCA, mid % stenosis. TTE performed on 6/27/2019 revealed EF of 68%, mild LVH, normal valvular function.     Patient was advised to undergo the aforementioned procedure.  An operative risk of  4-5% including a 5-10% risk of stroke, infection, renal failure, or other major complications were outlined to the patient and his family and they agreed to proceed.      OR sahil

## 2019-06-27 NOTE — CHART NOTE - NSCHARTNOTEFT_GEN_A_CORE
PRE-OP DIAGNOSIS: Unstable angina    PROCEDURE: [facility] with coronary angiography    Physician: BEN Conteh MD  Assistant: Allie Power    ANESTHESIA TYPE:  [ x ] Sedation  [ x ] Local/Regional    ESTIMATED BLOOD LOSS:    10   mL    CONDITION  [ x ]Good    IV CONTRAST:  70     mL    FINDINGS    Left Heart Catheterization:    LVEDP: WNL  3 V CAD  Right radial 6 Fr sheath hemostasis with radial D stat  Right femoral 8 Fr IABP using micropuncture  Right dominant circulation                          Left main 70% ostial lesion  LAD:  Svere diffuse disease with total occlusion,                       Left Circumflex: Heavily calcified, severe diffuse disease  Right Coronary Artery: Subtotal oclusion of mid RCA    IMPLANTS:  8 Fr IABP for severe 3 V CAD and unstable angina    POST-OP DIAGNOSIS  Severe 3 V CAD    PLAN OF CARE  [x ] CT Surgery consult called  c/w IABP 1:1  c/w heparin gtt, Nitro gtt for BP/chest pain control PRE-OP DIAGNOSIS: Unstable angina    PROCEDURE: [facility] with coronary angiography    Physician: BEN Conteh MD  Assistant: Allie Power    ANESTHESIA TYPE:  [ x ] Sedation  [ x ] Local/Regional    ESTIMATED BLOOD LOSS:    10   mL    CONDITION  [ x ]Good    IV CONTRAST:  70     mL    FINDINGS    Left Heart Catheterization:    LVEDP: WNL  3 V CAD  Right radial 6 Fr sheath hemostasis with radial D stat  Right femoral 8 Fr IABP using micropuncture  Right dominant circulation                          Left main 70% ostial lesion  LAD:  Svere diffuse disease with total occlusion,                       Left Circumflex: Heavily calcified, severe diffuse disease  Right Coronary Artery:  of mid RCA    IMPLANTS:  8 Fr IABP for severe 3 V CAD and unstable angina    POST-OP DIAGNOSIS  Severe 3 V CAD    PLAN OF CARE  [x ] CT Surgery consult called  c/w IABP 1:1  c/w heparin gtt, Nitro gtt for BP/chest pain control

## 2019-06-27 NOTE — H&P CARDIOLOGY - HISTORY OF PRESENT ILLNESS
87 yrs old with Hx of CAD ( MI 30ys ago) presented for elective LHC.  pt was having chest pain on minimal exertion . today pt had an episode of chest pain with ECG revealing ST depressions.  currently pain free. on nitro drip.

## 2019-06-28 ENCOUNTER — TRANSCRIPTION ENCOUNTER (OUTPATIENT)
Age: 84
End: 2019-06-28

## 2019-06-28 LAB
ALBUMIN SERPL ELPH-MCNC: 2.3 G/DL — LOW (ref 3.5–5.2)
ALBUMIN SERPL ELPH-MCNC: 3 G/DL — LOW (ref 3.5–5.2)
ALP SERPL-CCNC: 78 U/L — SIGNIFICANT CHANGE UP (ref 30–115)
ALP SERPL-CCNC: 86 U/L — SIGNIFICANT CHANGE UP (ref 30–115)
ALT FLD-CCNC: 12 U/L — SIGNIFICANT CHANGE UP (ref 0–41)
ALT FLD-CCNC: 85 U/L — HIGH (ref 0–41)
ANION GAP SERPL CALC-SCNC: 10 MMOL/L — SIGNIFICANT CHANGE UP (ref 7–14)
ANION GAP SERPL CALC-SCNC: 14 MMOL/L — SIGNIFICANT CHANGE UP (ref 7–14)
APPEARANCE UR: CLEAR — SIGNIFICANT CHANGE UP
APTT BLD: 25.3 SEC — LOW (ref 27–39.2)
AST SERPL-CCNC: 18 U/L — SIGNIFICANT CHANGE UP (ref 0–41)
AST SERPL-CCNC: 232 U/L — HIGH (ref 0–41)
BACTERIA # UR AUTO: ABNORMAL /HPF
BASE EXCESS BLDA CALC-SCNC: -2.1 MMOL/L — LOW (ref -2–2)
BASOPHILS # BLD AUTO: 0.01 K/UL — SIGNIFICANT CHANGE UP (ref 0–0.2)
BASOPHILS NFR BLD AUTO: 0.2 % — SIGNIFICANT CHANGE UP (ref 0–1)
BILIRUB SERPL-MCNC: 0.5 MG/DL — SIGNIFICANT CHANGE UP (ref 0.2–1.2)
BILIRUB SERPL-MCNC: 0.7 MG/DL — SIGNIFICANT CHANGE UP (ref 0.2–1.2)
BILIRUB UR-MCNC: NEGATIVE — SIGNIFICANT CHANGE UP
BUN SERPL-MCNC: 14 MG/DL — SIGNIFICANT CHANGE UP (ref 10–20)
BUN SERPL-MCNC: 16 MG/DL — SIGNIFICANT CHANGE UP (ref 10–20)
CALCIUM SERPL-MCNC: 7.9 MG/DL — LOW (ref 8.5–10.1)
CALCIUM SERPL-MCNC: 8.3 MG/DL — LOW (ref 8.5–10.1)
CHLORIDE SERPL-SCNC: 102 MMOL/L — SIGNIFICANT CHANGE UP (ref 98–110)
CHLORIDE SERPL-SCNC: 103 MMOL/L — SIGNIFICANT CHANGE UP (ref 98–110)
CO2 SERPL-SCNC: 20 MMOL/L — SIGNIFICANT CHANGE UP (ref 17–32)
CO2 SERPL-SCNC: 25 MMOL/L — SIGNIFICANT CHANGE UP (ref 17–32)
COLOR SPEC: YELLOW — SIGNIFICANT CHANGE UP
CREAT SERPL-MCNC: 1 MG/DL — SIGNIFICANT CHANGE UP (ref 0.7–1.5)
CREAT SERPL-MCNC: 1.2 MG/DL — SIGNIFICANT CHANGE UP (ref 0.7–1.5)
DIFF PNL FLD: ABNORMAL
EOSINOPHIL # BLD AUTO: 0.11 K/UL — SIGNIFICANT CHANGE UP (ref 0–0.7)
EOSINOPHIL NFR BLD AUTO: 1.7 % — SIGNIFICANT CHANGE UP (ref 0–8)
EPI CELLS # UR: ABNORMAL /HPF
GAS PNL BLDA: SIGNIFICANT CHANGE UP
GAS PNL BLDA: SIGNIFICANT CHANGE UP
GLUCOSE BLDC GLUCOMTR-MCNC: 188 MG/DL — HIGH (ref 70–99)
GLUCOSE SERPL-MCNC: 128 MG/DL — HIGH (ref 70–99)
GLUCOSE SERPL-MCNC: 161 MG/DL — HIGH (ref 70–99)
GLUCOSE UR QL: NEGATIVE MG/DL — SIGNIFICANT CHANGE UP
HCO3 BLDA-SCNC: 23 MMOL/L — SIGNIFICANT CHANGE UP (ref 23–27)
HCT VFR BLD CALC: 23.8 % — LOW (ref 42–52)
HCT VFR BLD CALC: 31.3 % — LOW (ref 42–52)
HGB BLD-MCNC: 10.1 G/DL — LOW (ref 14–18)
HGB BLD-MCNC: 7.8 G/DL — LOW (ref 14–18)
IMM GRANULOCYTES NFR BLD AUTO: 0.9 % — HIGH (ref 0.1–0.3)
INR BLD: 1.19 RATIO — SIGNIFICANT CHANGE UP (ref 0.65–1.3)
KETONES UR-MCNC: NEGATIVE — SIGNIFICANT CHANGE UP
LEUKOCYTE ESTERASE UR-ACNC: ABNORMAL
LYMPHOCYTES # BLD AUTO: 0.85 K/UL — LOW (ref 1.2–3.4)
LYMPHOCYTES # BLD AUTO: 13.2 % — LOW (ref 20.5–51.1)
MAGNESIUM SERPL-MCNC: 3.8 MG/DL — CRITICAL HIGH (ref 1.8–2.4)
MCHC RBC-ENTMCNC: 29.3 PG — SIGNIFICANT CHANGE UP (ref 27–31)
MCHC RBC-ENTMCNC: 29.4 PG — SIGNIFICANT CHANGE UP (ref 27–31)
MCHC RBC-ENTMCNC: 32.3 G/DL — SIGNIFICANT CHANGE UP (ref 32–37)
MCHC RBC-ENTMCNC: 32.8 G/DL — SIGNIFICANT CHANGE UP (ref 32–37)
MCV RBC AUTO: 89.8 FL — SIGNIFICANT CHANGE UP (ref 80–94)
MCV RBC AUTO: 90.7 FL — SIGNIFICANT CHANGE UP (ref 80–94)
MONOCYTES # BLD AUTO: 0.23 K/UL — SIGNIFICANT CHANGE UP (ref 0.1–0.6)
MONOCYTES NFR BLD AUTO: 3.6 % — SIGNIFICANT CHANGE UP (ref 1.7–9.3)
NEUTROPHILS # BLD AUTO: 5.18 K/UL — SIGNIFICANT CHANGE UP (ref 1.4–6.5)
NEUTROPHILS NFR BLD AUTO: 80.4 % — HIGH (ref 42.2–75.2)
NITRITE UR-MCNC: NEGATIVE — SIGNIFICANT CHANGE UP
NRBC # BLD: 0 /100 WBCS — SIGNIFICANT CHANGE UP (ref 0–0)
NRBC # BLD: 0 /100 WBCS — SIGNIFICANT CHANGE UP (ref 0–0)
PCO2 BLDA: 42 MMHG — SIGNIFICANT CHANGE UP (ref 38–42)
PH BLDA: 7.35 — LOW (ref 7.38–7.42)
PH UR: 5.5 — SIGNIFICANT CHANGE UP (ref 5–8)
PLATELET # BLD AUTO: 63 K/UL — LOW (ref 130–400)
PLATELET # BLD AUTO: 73 K/UL — LOW (ref 130–400)
PO2 BLDA: 92 MMHG — SIGNIFICANT CHANGE UP (ref 78–95)
POTASSIUM SERPL-MCNC: 4.2 MMOL/L — SIGNIFICANT CHANGE UP (ref 3.5–5)
POTASSIUM SERPL-MCNC: 4.2 MMOL/L — SIGNIFICANT CHANGE UP (ref 3.5–5)
POTASSIUM SERPL-SCNC: 4.2 MMOL/L — SIGNIFICANT CHANGE UP (ref 3.5–5)
POTASSIUM SERPL-SCNC: 4.2 MMOL/L — SIGNIFICANT CHANGE UP (ref 3.5–5)
PROT SERPL-MCNC: 4.1 G/DL — LOW (ref 6–8)
PROT SERPL-MCNC: 5.2 G/DL — LOW (ref 6–8)
PROT UR-MCNC: ABNORMAL MG/DL
PROTHROM AB SERPL-ACNC: 13.7 SEC — HIGH (ref 9.95–12.87)
RBC # BLD: 2.65 M/UL — LOW (ref 4.7–6.1)
RBC # BLD: 3.45 M/UL — LOW (ref 4.7–6.1)
RBC # FLD: 14.2 % — SIGNIFICANT CHANGE UP (ref 11.5–14.5)
RBC # FLD: 14.3 % — SIGNIFICANT CHANGE UP (ref 11.5–14.5)
RBC CASTS # UR COMP ASSIST: ABNORMAL /HPF
SAO2 % BLDA: 97 % — SIGNIFICANT CHANGE UP (ref 94–98)
SODIUM SERPL-SCNC: 137 MMOL/L — SIGNIFICANT CHANGE UP (ref 135–146)
SODIUM SERPL-SCNC: 137 MMOL/L — SIGNIFICANT CHANGE UP (ref 135–146)
SP GR SPEC: 1.02 — SIGNIFICANT CHANGE UP (ref 1.01–1.03)
TSH SERPL-MCNC: 2.61 UIU/ML — SIGNIFICANT CHANGE UP (ref 0.27–4.2)
UROBILINOGEN FLD QL: 0.2 MG/DL — SIGNIFICANT CHANGE UP (ref 0.2–0.2)
WBC # BLD: 2.83 K/UL — LOW (ref 4.8–10.8)
WBC # BLD: 6.44 K/UL — SIGNIFICANT CHANGE UP (ref 4.8–10.8)
WBC # FLD AUTO: 2.83 K/UL — LOW (ref 4.8–10.8)
WBC # FLD AUTO: 6.44 K/UL — SIGNIFICANT CHANGE UP (ref 4.8–10.8)
WBC UR QL: ABNORMAL /HPF

## 2019-06-28 PROCEDURE — 33508 ENDOSCOPIC VEIN HARVEST: CPT | Mod: 82

## 2019-06-28 PROCEDURE — 71045 X-RAY EXAM CHEST 1 VIEW: CPT | Mod: 26

## 2019-06-28 PROCEDURE — 33533 CABG ARTERIAL SINGLE: CPT

## 2019-06-28 PROCEDURE — 33508 ENDOSCOPIC VEIN HARVEST: CPT

## 2019-06-28 PROCEDURE — 33519 CABG ARTERY-VEIN THREE: CPT

## 2019-06-28 PROCEDURE — 33533 CABG ARTERIAL SINGLE: CPT | Mod: 82

## 2019-06-28 PROCEDURE — 33519 CABG ARTERY-VEIN THREE: CPT | Mod: 82

## 2019-06-28 RX ORDER — DOCUSATE SODIUM 100 MG
100 CAPSULE ORAL THREE TIMES A DAY
Refills: 0 | Status: DISCONTINUED | OUTPATIENT
Start: 2019-06-28 | End: 2019-07-04

## 2019-06-28 RX ORDER — MAGNESIUM SULFATE 500 MG/ML
1 VIAL (ML) INJECTION EVERY 12 HOURS
Refills: 0 | Status: DISCONTINUED | OUTPATIENT
Start: 2019-06-28 | End: 2019-07-05

## 2019-06-28 RX ORDER — NITROGLYCERIN 6.5 MG
5 CAPSULE, EXTENDED RELEASE ORAL
Qty: 50 | Refills: 0 | Status: DISCONTINUED | OUTPATIENT
Start: 2019-06-28 | End: 2019-06-29

## 2019-06-28 RX ORDER — ONDANSETRON 8 MG/1
4 TABLET, FILM COATED ORAL ONCE
Refills: 0 | Status: DISCONTINUED | OUTPATIENT
Start: 2019-06-28 | End: 2019-06-29

## 2019-06-28 RX ORDER — CEFAZOLIN SODIUM 1 G
1000 VIAL (EA) INJECTION EVERY 8 HOURS
Refills: 0 | Status: COMPLETED | OUTPATIENT
Start: 2019-06-28 | End: 2019-06-29

## 2019-06-28 RX ORDER — CHLORHEXIDINE GLUCONATE 213 G/1000ML
15 SOLUTION TOPICAL EVERY 12 HOURS
Refills: 0 | Status: CANCELLED | OUTPATIENT
Start: 2019-06-28 | End: 2019-07-05

## 2019-06-28 RX ORDER — DEXMEDETOMIDINE HYDROCHLORIDE IN 0.9% SODIUM CHLORIDE 4 UG/ML
0.25 INJECTION INTRAVENOUS
Qty: 200 | Refills: 0 | Status: DISCONTINUED | OUTPATIENT
Start: 2019-06-28 | End: 2019-06-29

## 2019-06-28 RX ORDER — NOREPINEPHRINE BITARTRATE/D5W 8 MG/250ML
0.05 PLASTIC BAG, INJECTION (ML) INTRAVENOUS
Qty: 8 | Refills: 0 | Status: DISCONTINUED | OUTPATIENT
Start: 2019-06-28 | End: 2019-06-29

## 2019-06-28 RX ORDER — OXYCODONE HYDROCHLORIDE 5 MG/1
5 TABLET ORAL EVERY 6 HOURS
Refills: 0 | Status: DISCONTINUED | OUTPATIENT
Start: 2019-06-28 | End: 2019-06-29

## 2019-06-28 RX ORDER — MEPERIDINE HYDROCHLORIDE 50 MG/ML
25 INJECTION INTRAMUSCULAR; INTRAVENOUS; SUBCUTANEOUS ONCE
Refills: 0 | Status: DISCONTINUED | OUTPATIENT
Start: 2019-06-28 | End: 2019-06-29

## 2019-06-28 RX ORDER — CHLORHEXIDINE GLUCONATE 213 G/1000ML
1 SOLUTION TOPICAL
Refills: 0 | Status: DISCONTINUED | OUTPATIENT
Start: 2019-06-28 | End: 2019-07-05

## 2019-06-28 RX ORDER — ALBUMIN HUMAN 25 %
1000 VIAL (ML) INTRAVENOUS ONCE
Refills: 0 | Status: COMPLETED | OUTPATIENT
Start: 2019-06-28 | End: 2019-06-28

## 2019-06-28 RX ORDER — OXYCODONE HYDROCHLORIDE 5 MG/1
10 TABLET ORAL EVERY 4 HOURS
Refills: 0 | Status: DISCONTINUED | OUTPATIENT
Start: 2019-06-28 | End: 2019-06-29

## 2019-06-28 RX ORDER — CHLORHEXIDINE GLUCONATE 213 G/1000ML
5 SOLUTION TOPICAL
Refills: 0 | Status: DISCONTINUED | OUTPATIENT
Start: 2019-06-28 | End: 2019-06-28

## 2019-06-28 RX ORDER — ASPIRIN/CALCIUM CARB/MAGNESIUM 324 MG
300 TABLET ORAL ONCE
Refills: 0 | Status: COMPLETED | OUTPATIENT
Start: 2019-06-28 | End: 2019-06-28

## 2019-06-28 RX ORDER — SODIUM CHLORIDE 9 MG/ML
1000 INJECTION INTRAMUSCULAR; INTRAVENOUS; SUBCUTANEOUS
Refills: 0 | Status: DISCONTINUED | OUTPATIENT
Start: 2019-06-28 | End: 2019-07-04

## 2019-06-28 RX ORDER — CHLORHEXIDINE GLUCONATE 213 G/1000ML
15 SOLUTION TOPICAL
Refills: 0 | Status: DISCONTINUED | OUTPATIENT
Start: 2019-06-28 | End: 2019-06-29

## 2019-06-28 RX ORDER — ASPIRIN/CALCIUM CARB/MAGNESIUM 324 MG
325 TABLET ORAL DAILY
Refills: 0 | Status: DISCONTINUED | OUTPATIENT
Start: 2019-06-28 | End: 2019-06-30

## 2019-06-28 RX ORDER — IPRATROPIUM BROMIDE 0.2 MG/ML
2 SOLUTION, NON-ORAL INHALATION EVERY 6 HOURS
Refills: 0 | Status: DISCONTINUED | OUTPATIENT
Start: 2019-06-28 | End: 2019-06-30

## 2019-06-28 RX ORDER — PROPOFOL 10 MG/ML
10 INJECTION, EMULSION INTRAVENOUS
Qty: 1000 | Refills: 0 | Status: DISCONTINUED | OUTPATIENT
Start: 2019-06-28 | End: 2019-06-29

## 2019-06-28 RX ORDER — FAMOTIDINE 10 MG/ML
20 INJECTION INTRAVENOUS EVERY 12 HOURS
Refills: 0 | Status: DISCONTINUED | OUTPATIENT
Start: 2019-06-28 | End: 2019-06-29

## 2019-06-28 RX ORDER — INSULIN HUMAN 100 [IU]/ML
1 INJECTION, SOLUTION SUBCUTANEOUS
Qty: 100 | Refills: 0 | Status: DISCONTINUED | OUTPATIENT
Start: 2019-06-28 | End: 2019-06-30

## 2019-06-28 RX ORDER — ALBUTEROL 90 UG/1
2 AEROSOL, METERED ORAL EVERY 6 HOURS
Refills: 0 | Status: DISCONTINUED | OUTPATIENT
Start: 2019-06-28 | End: 2019-06-30

## 2019-06-28 RX ORDER — NICARDIPINE HYDROCHLORIDE 30 MG/1
5 CAPSULE, EXTENDED RELEASE ORAL
Qty: 40 | Refills: 0 | Status: DISCONTINUED | OUTPATIENT
Start: 2019-06-28 | End: 2019-06-29

## 2019-06-28 RX ADMIN — ALBUTEROL 2 PUFF(S): 90 AEROSOL, METERED ORAL at 19:40

## 2019-06-28 RX ADMIN — Medication 1.5 MICROGRAM(S)/MIN: at 14:00

## 2019-06-28 RX ADMIN — FAMOTIDINE 20 MILLIGRAM(S): 10 INJECTION INTRAVENOUS at 19:21

## 2019-06-28 RX ADMIN — Medication 100 MILLIGRAM(S): at 19:21

## 2019-06-28 RX ADMIN — ALBUTEROL 2 PUFF(S): 90 AEROSOL, METERED ORAL at 14:15

## 2019-06-28 RX ADMIN — SODIUM CHLORIDE 20 MILLILITER(S): 9 INJECTION INTRAMUSCULAR; INTRAVENOUS; SUBCUTANEOUS at 13:30

## 2019-06-28 RX ADMIN — CHLORHEXIDINE GLUCONATE 15 MILLILITER(S): 213 SOLUTION TOPICAL at 22:40

## 2019-06-28 RX ADMIN — SODIUM CHLORIDE 3 MILLILITER(S): 9 INJECTION INTRAMUSCULAR; INTRAVENOUS; SUBCUTANEOUS at 06:00

## 2019-06-28 RX ADMIN — CHLORHEXIDINE GLUCONATE 15 MILLILITER(S): 213 SOLUTION TOPICAL at 05:20

## 2019-06-28 RX ADMIN — INSULIN HUMAN 1 UNIT(S)/HR: 100 INJECTION, SOLUTION SUBCUTANEOUS at 22:29

## 2019-06-28 RX ADMIN — CHLORHEXIDINE GLUCONATE 15 MILLILITER(S): 213 SOLUTION TOPICAL at 18:45

## 2019-06-28 RX ADMIN — Medication 2 PUFF(S): at 19:39

## 2019-06-28 RX ADMIN — Medication 500 MILLILITER(S): at 19:40

## 2019-06-28 RX ADMIN — Medication 300 MILLIGRAM(S): at 21:45

## 2019-06-28 NOTE — PACU DISCHARGE NOTE - COMMENTS
Pt s/p uncomplicated on-pump CABGx4.   VS: T: 35.5C; BP: 159/30; HR: 69; RR: 10; O2Sat: 100%; CO/CI: 4.5/2.3; PAP: 36/8; CVP: 12

## 2019-06-28 NOTE — DISCHARGE NOTE PROVIDER - CARE PROVIDERS DIRECT ADDRESSES
[email or web address]

## 2019-06-28 NOTE — DISCHARGE NOTE PROVIDER - NSDCFUADDAPPT_GEN_ALL_CORE_FT
follow up with Dr. Edmondson on July 2019 at follow up with Dr. Edmondson in 1-2 weeks, cardiology in 2 weeks, and with pmd in 4 weeks; please call for all appointments

## 2019-06-28 NOTE — DISCHARGE NOTE PROVIDER - HOSPITAL COURSE
87 year old male nonsmoker with PMHx of CAD (MI 30ys ago), Prostate CA, HTN, HLD and hypothyroidism. Patient had been c/o progressively worsening SOB/FONTENOT associated with chest discomfort now with minimal exertion relieved by sublingual nitro which patient reports has been taking more frequently since January. Patient now presents for elective LHC. During procedure patient began c/o of chest pain and was controlled with nitroglycerine drip. Patient was found to have severe Left main 3vCAD with preserved left ventricular function and IABP was inserted for hemodynamic instability. The following day he underwent myocardial revascularization via CABG. His po course was 87 year old male nonsmoker with PMHx of CAD (MI 30ys ago), Prostate CA, HTN, HLD and hypothyroidism. Patient had been c/o progressively worsening SOB/FONTENOT associated with chest discomfort now with minimal exertion relieved by sublingual nitro which patient reports has been taking more frequently since January. Patient now presents for elective LHC. During procedure patient began c/o of chest pain and was controlled with nitroglycerine drip. Patient was found to have severe Left main 3vCAD with preserved left ventricular function and IABP was inserted for hemodynamic instability. The following day he underwent myocardial revascularization via CABG. His po course was complicated by a. fib.  He was treated with Amio and was not started on coumadin due to his high risk for fall.  He otherwise had an uneventful hospital course and was discharged home in stable condition on POD # 8.

## 2019-06-28 NOTE — DISCHARGE NOTE PROVIDER - PROVIDER TOKENS
PROVIDER:[TOKEN:[[id number]]],PROVIDER:[TOKEN:[[id number]]],PROVIDER:[TOKEN:[[id number]]],PROVIDER:[TOKEN:[[id number]]]

## 2019-06-28 NOTE — DISCHARGE NOTE PROVIDER - NSDCACTIVITY_GEN_ALL_CORE
Showering allowed/Do not drive or operate machinery/Do not make important decisions/Stairs allowed/Walking - Indoors allowed/No heavy lifting/straining/Walking - Outdoors allowed

## 2019-06-28 NOTE — DISCHARGE NOTE PROVIDER - CARE PROVIDER_API CALL
Jonah Kay)  Internal Medicine  [address] [facility] [place] [address]  Phone: [phone]  Fax: [phone]  Follow Up Time:     Michele Monge)  Cardiovascular Disease; Interventional Cardiology  [address] [place] [address]  Phone: [phone]  Fax: [phone]  Follow Up Time:     Michele Carlisle)  Urology  [address] [place] [address]  Phone: [phone]  Fax: [phone]  Follow Up Time:     Natalio Edmondson)  Surgery; Thoracic and Cardiac Surgery  [address] [place] [address], [facility] [address]  [place], [place] [id number]  Phone: [phone]  Fax: [phone]  Follow Up Time:

## 2019-06-28 NOTE — BRIEF OPERATIVE NOTE - NSICDXBRIEFPOSTOP_GEN_ALL_CORE_FT
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

## 2019-06-28 NOTE — BRIEF OPERATIVE NOTE - NSICDXBRIEFPREOP_GEN_ALL_CORE_FT
PRE-OP DIAGNOSIS:  Coronary artery disease with unstable angina pectoris 28-Jun-2019 11:58:38  Paddy Feldman

## 2019-06-29 LAB
ALBUMIN SERPL ELPH-MCNC: 3.2 G/DL — LOW (ref 3.5–5.2)
ALP SERPL-CCNC: 75 U/L — SIGNIFICANT CHANGE UP (ref 30–115)
ALT FLD-CCNC: 66 U/L — HIGH (ref 0–41)
ANION GAP SERPL CALC-SCNC: 11 MMOL/L — SIGNIFICANT CHANGE UP (ref 7–14)
APTT BLD: 25.7 SEC — LOW (ref 27–39.2)
AST SERPL-CCNC: 134 U/L — HIGH (ref 0–41)
BASE EXCESS BLDA CALC-SCNC: -1 MMOL/L — SIGNIFICANT CHANGE UP (ref -2–2)
BASE EXCESS BLDA CALC-SCNC: -1.6 MMOL/L — SIGNIFICANT CHANGE UP (ref -2–2)
BASE EXCESS BLDV CALC-SCNC: -0.3 MMOL/L — SIGNIFICANT CHANGE UP (ref -2–2)
BASOPHILS # BLD AUTO: 0.01 K/UL — SIGNIFICANT CHANGE UP (ref 0–0.2)
BASOPHILS NFR BLD AUTO: 0.2 % — SIGNIFICANT CHANGE UP (ref 0–1)
BILIRUB SERPL-MCNC: 0.6 MG/DL — SIGNIFICANT CHANGE UP (ref 0.2–1.2)
BUN SERPL-MCNC: 17 MG/DL — SIGNIFICANT CHANGE UP (ref 10–20)
CA-I SERPL-SCNC: 1.17 MMOL/L — SIGNIFICANT CHANGE UP (ref 1.12–1.3)
CALCIUM SERPL-MCNC: 7.8 MG/DL — LOW (ref 8.5–10.1)
CHLORIDE SERPL-SCNC: 106 MMOL/L — SIGNIFICANT CHANGE UP (ref 98–110)
CO2 SERPL-SCNC: 22 MMOL/L — SIGNIFICANT CHANGE UP (ref 17–32)
CREAT SERPL-MCNC: 1.2 MG/DL — SIGNIFICANT CHANGE UP (ref 0.7–1.5)
EOSINOPHIL # BLD AUTO: 0.01 K/UL — SIGNIFICANT CHANGE UP (ref 0–0.7)
EOSINOPHIL NFR BLD AUTO: 0.2 % — SIGNIFICANT CHANGE UP (ref 0–8)
GAS PNL BLDV: 138 MMOL/L — SIGNIFICANT CHANGE UP (ref 136–145)
GAS PNL BLDV: SIGNIFICANT CHANGE UP
GLUCOSE BLDC GLUCOMTR-MCNC: 115 MG/DL — HIGH (ref 70–99)
GLUCOSE BLDC GLUCOMTR-MCNC: 124 MG/DL — HIGH (ref 70–99)
GLUCOSE BLDC GLUCOMTR-MCNC: 126 MG/DL — HIGH (ref 70–99)
GLUCOSE BLDC GLUCOMTR-MCNC: 127 MG/DL — HIGH (ref 70–99)
GLUCOSE BLDC GLUCOMTR-MCNC: 133 MG/DL — HIGH (ref 70–99)
GLUCOSE BLDC GLUCOMTR-MCNC: 134 MG/DL — HIGH (ref 70–99)
GLUCOSE BLDC GLUCOMTR-MCNC: 139 MG/DL — HIGH (ref 70–99)
GLUCOSE BLDC GLUCOMTR-MCNC: 158 MG/DL — HIGH (ref 70–99)
GLUCOSE BLDC GLUCOMTR-MCNC: 87 MG/DL — SIGNIFICANT CHANGE UP (ref 70–99)
GLUCOSE SERPL-MCNC: 136 MG/DL — HIGH (ref 70–99)
HCO3 BLDA-SCNC: 24 MMOL/L — SIGNIFICANT CHANGE UP (ref 23–27)
HCO3 BLDA-SCNC: 25 MMOL/L — SIGNIFICANT CHANGE UP (ref 23–27)
HCO3 BLDV-SCNC: 26 MMOL/L — SIGNIFICANT CHANGE UP (ref 22–29)
HCT VFR BLD CALC: 30.5 % — LOW (ref 42–52)
HCT VFR BLDA CALC: 31.5 % — LOW (ref 34–44)
HGB BLD CALC-MCNC: 10.3 G/DL — LOW (ref 14–18)
HGB BLD-MCNC: 10 G/DL — LOW (ref 14–18)
IMM GRANULOCYTES NFR BLD AUTO: 0.3 % — SIGNIFICANT CHANGE UP (ref 0.1–0.3)
INR BLD: 1.27 RATIO — SIGNIFICANT CHANGE UP (ref 0.65–1.3)
LACTATE BLDV-MCNC: 0.9 MMOL/L — SIGNIFICANT CHANGE UP (ref 0.5–1.6)
LYMPHOCYTES # BLD AUTO: 0.46 K/UL — LOW (ref 1.2–3.4)
LYMPHOCYTES # BLD AUTO: 6.9 % — LOW (ref 20.5–51.1)
MAGNESIUM SERPL-MCNC: 2.6 MG/DL — HIGH (ref 1.8–2.4)
MCHC RBC-ENTMCNC: 29.7 PG — SIGNIFICANT CHANGE UP (ref 27–31)
MCHC RBC-ENTMCNC: 32.8 G/DL — SIGNIFICANT CHANGE UP (ref 32–37)
MCV RBC AUTO: 90.5 FL — SIGNIFICANT CHANGE UP (ref 80–94)
MONOCYTES # BLD AUTO: 0.61 K/UL — HIGH (ref 0.1–0.6)
MONOCYTES NFR BLD AUTO: 9.2 % — SIGNIFICANT CHANGE UP (ref 1.7–9.3)
NEUTROPHILS # BLD AUTO: 5.51 K/UL — SIGNIFICANT CHANGE UP (ref 1.4–6.5)
NEUTROPHILS NFR BLD AUTO: 83.2 % — HIGH (ref 42.2–75.2)
NRBC # BLD: 0 /100 WBCS — SIGNIFICANT CHANGE UP (ref 0–0)
PCO2 BLDA: 45 MMHG — HIGH (ref 38–42)
PCO2 BLDA: 46 MMHG — HIGH (ref 38–42)
PCO2 BLDV: 48 MMHG — SIGNIFICANT CHANGE UP (ref 41–51)
PH BLDA: 7.34 — LOW (ref 7.38–7.42)
PH BLDA: 7.34 — LOW (ref 7.38–7.42)
PH BLDV: 7.34 — SIGNIFICANT CHANGE UP (ref 7.26–7.43)
PLATELET # BLD AUTO: 72 K/UL — LOW (ref 130–400)
PO2 BLDA: 102 MMHG — HIGH (ref 78–95)
PO2 BLDA: 98 MMHG — HIGH (ref 78–95)
PO2 BLDV: 39 MMHG — SIGNIFICANT CHANGE UP (ref 20–40)
POTASSIUM BLDV-SCNC: 4 MMOL/L — SIGNIFICANT CHANGE UP (ref 3.3–5.6)
POTASSIUM SERPL-MCNC: 4.4 MMOL/L — SIGNIFICANT CHANGE UP (ref 3.5–5)
POTASSIUM SERPL-SCNC: 4.4 MMOL/L — SIGNIFICANT CHANGE UP (ref 3.5–5)
PROT SERPL-MCNC: 5 G/DL — LOW (ref 6–8)
PROTHROM AB SERPL-ACNC: 14.6 SEC — HIGH (ref 9.95–12.87)
RBC # BLD: 3.37 M/UL — LOW (ref 4.7–6.1)
RBC # FLD: 14.5 % — SIGNIFICANT CHANGE UP (ref 11.5–14.5)
SAO2 % BLDA: 97 % — SIGNIFICANT CHANGE UP (ref 94–98)
SAO2 % BLDA: 98 % — SIGNIFICANT CHANGE UP (ref 94–98)
SAO2 % BLDV: 72 % — SIGNIFICANT CHANGE UP
SODIUM SERPL-SCNC: 139 MMOL/L — SIGNIFICANT CHANGE UP (ref 135–146)
WBC # BLD: 6.62 K/UL — SIGNIFICANT CHANGE UP (ref 4.8–10.8)
WBC # FLD AUTO: 6.62 K/UL — SIGNIFICANT CHANGE UP (ref 4.8–10.8)

## 2019-06-29 PROCEDURE — 71045 X-RAY EXAM CHEST 1 VIEW: CPT | Mod: 26,76

## 2019-06-29 PROCEDURE — 93010 ELECTROCARDIOGRAM REPORT: CPT

## 2019-06-29 PROCEDURE — 99233 SBSQ HOSP IP/OBS HIGH 50: CPT

## 2019-06-29 RX ORDER — ALBUMIN HUMAN 25 %
500 VIAL (ML) INTRAVENOUS ONCE
Refills: 0 | Status: COMPLETED | OUTPATIENT
Start: 2019-06-29 | End: 2019-06-29

## 2019-06-29 RX ORDER — FUROSEMIDE 40 MG
20 TABLET ORAL ONCE
Refills: 0 | Status: COMPLETED | OUTPATIENT
Start: 2019-06-29 | End: 2019-06-29

## 2019-06-29 RX ORDER — FAMOTIDINE 10 MG/ML
20 INJECTION INTRAVENOUS DAILY
Refills: 0 | Status: DISCONTINUED | OUTPATIENT
Start: 2019-06-29 | End: 2019-07-05

## 2019-06-29 RX ORDER — LEVOTHYROXINE SODIUM 125 MCG
50 TABLET ORAL DAILY
Refills: 0 | Status: DISCONTINUED | OUTPATIENT
Start: 2019-06-29 | End: 2019-07-05

## 2019-06-29 RX ORDER — ONDANSETRON 8 MG/1
4 TABLET, FILM COATED ORAL ONCE
Refills: 0 | Status: COMPLETED | OUTPATIENT
Start: 2019-06-29 | End: 2019-06-29

## 2019-06-29 RX ORDER — AMIODARONE HYDROCHLORIDE 400 MG/1
1 TABLET ORAL
Qty: 900 | Refills: 0 | Status: DISCONTINUED | OUTPATIENT
Start: 2019-06-29 | End: 2019-06-30

## 2019-06-29 RX ORDER — CALCIUM GLUCONATE 100 MG/ML
2 VIAL (ML) INTRAVENOUS ONCE
Refills: 0 | Status: COMPLETED | OUTPATIENT
Start: 2019-06-29 | End: 2019-06-29

## 2019-06-29 RX ORDER — TRAMADOL HYDROCHLORIDE 50 MG/1
50 TABLET ORAL EVERY 8 HOURS
Refills: 0 | Status: DISCONTINUED | OUTPATIENT
Start: 2019-06-29 | End: 2019-06-29

## 2019-06-29 RX ORDER — LACTULOSE 10 G/15ML
20 SOLUTION ORAL ONCE
Refills: 0 | Status: COMPLETED | OUTPATIENT
Start: 2019-06-29 | End: 2019-06-29

## 2019-06-29 RX ORDER — AMIODARONE HYDROCHLORIDE 400 MG/1
150 TABLET ORAL ONCE
Refills: 0 | Status: COMPLETED | OUTPATIENT
Start: 2019-06-29 | End: 2019-06-29

## 2019-06-29 RX ORDER — METOPROLOL TARTRATE 50 MG
5 TABLET ORAL ONCE
Refills: 0 | Status: COMPLETED | OUTPATIENT
Start: 2019-06-29 | End: 2019-06-29

## 2019-06-29 RX ORDER — SENNA PLUS 8.6 MG/1
2 TABLET ORAL ONCE
Refills: 0 | Status: COMPLETED | OUTPATIENT
Start: 2019-06-29 | End: 2019-06-29

## 2019-06-29 RX ORDER — DILTIAZEM HCL 120 MG
10 CAPSULE, EXT RELEASE 24 HR ORAL ONCE
Refills: 0 | Status: COMPLETED | OUTPATIENT
Start: 2019-06-29 | End: 2019-06-29

## 2019-06-29 RX ORDER — METOPROLOL TARTRATE 50 MG
50 TABLET ORAL
Refills: 0 | Status: DISCONTINUED | OUTPATIENT
Start: 2019-06-29 | End: 2019-06-29

## 2019-06-29 RX ORDER — IBUTILIDE FUMARATE 0.1 MG/ML
1 INJECTION, SOLUTION INTRAVENOUS ONCE
Refills: 0 | Status: DISCONTINUED | OUTPATIENT
Start: 2019-06-29 | End: 2019-06-29

## 2019-06-29 RX ORDER — KETOROLAC TROMETHAMINE 30 MG/ML
15 SYRINGE (ML) INJECTION ONCE
Refills: 0 | Status: DISCONTINUED | OUTPATIENT
Start: 2019-06-29 | End: 2019-06-29

## 2019-06-29 RX ORDER — METOPROLOL TARTRATE 50 MG
25 TABLET ORAL
Refills: 0 | Status: DISCONTINUED | OUTPATIENT
Start: 2019-06-29 | End: 2019-07-02

## 2019-06-29 RX ORDER — KETOROLAC TROMETHAMINE 30 MG/ML
15 SYRINGE (ML) INJECTION EVERY 6 HOURS
Refills: 0 | Status: DISCONTINUED | OUTPATIENT
Start: 2019-06-29 | End: 2019-06-29

## 2019-06-29 RX ORDER — AMIODARONE HYDROCHLORIDE 400 MG/1
150 TABLET ORAL ONCE
Refills: 0 | Status: DISCONTINUED | OUTPATIENT
Start: 2019-06-29 | End: 2019-06-29

## 2019-06-29 RX ADMIN — Medication 20 MILLIGRAM(S): at 15:10

## 2019-06-29 RX ADMIN — Medication 200 GRAM(S): at 08:03

## 2019-06-29 RX ADMIN — Medication 25 MILLIGRAM(S): at 06:45

## 2019-06-29 RX ADMIN — Medication 100 GRAM(S): at 06:18

## 2019-06-29 RX ADMIN — Medication 100 MILLIGRAM(S): at 06:45

## 2019-06-29 RX ADMIN — AMIODARONE HYDROCHLORIDE 618 MILLIGRAM(S): 400 TABLET ORAL at 23:12

## 2019-06-29 RX ADMIN — Medication 25 MILLIGRAM(S): at 17:09

## 2019-06-29 RX ADMIN — Medication 250 MILLILITER(S): at 09:45

## 2019-06-29 RX ADMIN — Medication 600 MILLIGRAM(S): at 17:09

## 2019-06-29 RX ADMIN — Medication 15 MILLIGRAM(S): at 09:10

## 2019-06-29 RX ADMIN — FAMOTIDINE 20 MILLIGRAM(S): 10 INJECTION INTRAVENOUS at 06:45

## 2019-06-29 RX ADMIN — TRAMADOL HYDROCHLORIDE 50 MILLIGRAM(S): 50 TABLET ORAL at 15:30

## 2019-06-29 RX ADMIN — Medication 15 MILLIGRAM(S): at 09:25

## 2019-06-29 RX ADMIN — Medication 5 MILLIGRAM(S): at 15:32

## 2019-06-29 RX ADMIN — Medication 100 MILLIGRAM(S): at 10:43

## 2019-06-29 RX ADMIN — ONDANSETRON 4 MILLIGRAM(S): 8 TABLET, FILM COATED ORAL at 16:45

## 2019-06-29 RX ADMIN — OXYCODONE HYDROCHLORIDE 5 MILLIGRAM(S): 5 TABLET ORAL at 06:00

## 2019-06-29 RX ADMIN — INSULIN HUMAN 1 UNIT(S)/HR: 100 INJECTION, SOLUTION SUBCUTANEOUS at 12:45

## 2019-06-29 RX ADMIN — CHLORHEXIDINE GLUCONATE 1 APPLICATION(S): 213 SOLUTION TOPICAL at 06:17

## 2019-06-29 RX ADMIN — Medication 100 MILLIGRAM(S): at 02:23

## 2019-06-29 RX ADMIN — TRAMADOL HYDROCHLORIDE 50 MILLIGRAM(S): 50 TABLET ORAL at 15:00

## 2019-06-29 RX ADMIN — CHLORHEXIDINE GLUCONATE 15 MILLILITER(S): 213 SOLUTION TOPICAL at 17:09

## 2019-06-29 RX ADMIN — Medication 600 MILLIGRAM(S): at 06:45

## 2019-06-29 RX ADMIN — OXYCODONE HYDROCHLORIDE 5 MILLIGRAM(S): 5 TABLET ORAL at 06:30

## 2019-06-29 RX ADMIN — Medication 100 GRAM(S): at 18:22

## 2019-06-29 RX ADMIN — FAMOTIDINE 20 MILLIGRAM(S): 10 INJECTION INTRAVENOUS at 12:33

## 2019-06-29 RX ADMIN — Medication 100 MILLIGRAM(S): at 21:07

## 2019-06-29 RX ADMIN — SODIUM CHLORIDE 20 MILLILITER(S): 9 INJECTION INTRAMUSCULAR; INTRAVENOUS; SUBCUTANEOUS at 12:44

## 2019-06-29 RX ADMIN — Medication 50 MICROGRAM(S): at 12:33

## 2019-06-29 RX ADMIN — Medication 100 MILLIGRAM(S): at 14:04

## 2019-06-29 RX ADMIN — AMIODARONE HYDROCHLORIDE 33.33 MG/MIN: 400 TABLET ORAL at 23:28

## 2019-06-29 RX ADMIN — CHLORHEXIDINE GLUCONATE 1 APPLICATION(S): 213 SOLUTION TOPICAL at 22:15

## 2019-06-29 NOTE — PROGRESS NOTE ADULT - SUBJECTIVE AND OBJECTIVE BOX
OPERATIVE PROCEDURE(s):      CABG          POD #   1                    SURGEON(s): LEE Edmondson  SUBJECTIVE ASSESSMENT:87yMale patient seen and examined at bedside. Pt c/o Pain with deep inspiration controlled by pain meds. No other complaints. No acute events overnight.    Vital Signs Last 24 Hrs  T(F): 99.3 (2019 08:30), Max: 99.9 (2019 00:00)  HR: 66 (2019 10:30) (66 - 83)  BP: 118/56 (2019 19:30) (93/46 - 118/56)  BP(mean): 80 (2019 19:30) (65 - 80)  ABP: 125/54 (2019 10:30) (86/28 - 228/228)  ABP(mean): 76 (2019 10:30)  RR: 17 (2019 10:30) (10 - 24)  SpO2: 97% (2019 10:30) (94% - 100%)  CVP(mm Hg): 20 (2019 10:30)  CO: 4.4 (2019 05:15)  CI: 2.2 (2019 05:15)  PA: 41/14 (2019 10:30)  SVR: 1035 (2019 05:15)    I&O's Detail    2019 07:  -  2019 07:00  --------------------------------------------------------  IN:    Albumin 5%  - 500 mL: 1000 mL    dexmedetomidine Infusion: 26 mL    insulin regular Infusion: 24 mL    IV PiggyBack: 200 mL    niCARdipine Infusion: 220 mL    nitroglycerin  Infusion: 136 mL    Oral Fluid: 150 mL    sodium chloride 0.9%.: 360 mL  Total IN: 2116 mL    OUT:    Chest Tube: 200 mL    Chest Tube: 250 mL    Indwelling Catheter - Urethral: 1485 mL    Nasoenteral Tube: 150 mL  Total OUT: 2085 mL        Net: I&O's Detail    2019 07:  -  2019 07:00  --------------------------------------------------------  Total NET: 303 mL      2019 07:01  -  2019 07:00  --------------------------------------------------------  Total NET: 31 mL        CAPILLARY BLOOD GLUCOSE  139 (2019 08:57)  162 (2019 00:00)  183 (2019 22:30)      POCT Blood Glucose.: 139 mg/dL (2019 08:57)  POCT Blood Glucose.: 124 mg/dL (2019 06:29)  POCT Blood Glucose.: 134 mg/dL (2019 02:12)  POCT Blood Glucose.: 133 mg/dL (2019 01:16)  POCT Blood Glucose.: 188 mg/dL (2019 20:58)      Physical Exam:  General: NAD; A&Ox3  Cardiac: S1/S2, RRR, no murmur, no rubs  Lungs: unlabored respirations, + crackles b/l, no wheeze, no rales,   Abdomen: Soft/NT/ND; positive bowel sounds x 4  Sternum: Intact, no click,   Incisions: Incisions clean/dry/intact dressing  Extremities: + edema b/l lower extremities; good capillary refill; no cyanosis; palpable 1+ pedal pulses b/l    Central Venous Catheter: Yes[x]  No[] , If Yes indication:                    Day #1  Silva Catheter: Yes  [x] , No  [] , If yes indication:                                 Day #1  NGT: Yes [] No [x] ,    If Yes Placement:                                                   Day #1  EPICARDIAL WIRES:  [x] YES [] NO                                                            Day #1  BOWEL MOVEMENT:  [] YES [x] NO, If No, Timing since last BM Day #  CHEST TUBE(Left/Right):  [x] YES [] NO, If yes -  AIR LEAKS:  [] YES [x] NO        LABS:                        10.0<L>  6.62  )-----------( 72<L>    ( 2019 02:00 )             30.5<L>                        10.1<L>  6.44  )-----------( 63<L>    ( 2019 13:30 )             31.3<L>        139  |  106  |  17  ----------------------------<  136<H>  4.4   |  22  |  1.2      137  |  103  |  16  ----------------------------<  128<H>  4.2   |  20  |  1.2    Ca    7.8<L>      2019 02:00  Mg     2.6         TPro  5.0<L> [6.0 - 8.0]  /  Alb  3.2<L> [3.5 - 5.2]  /  TBili  0.6 [0.2 - 1.2]  /  DBili  x   /  AST  134<H> [0 - 41]  /  ALT  66<H> [0 - 41]  /  AlkPhos  75 [30 - 115]      PT/INR - ( 2019 02:00 )   PT: ;   INR: 1.27 ratio         PT/INR - ( 2019 13:30 )   PT: ;   INR: 1.19 ratio         PTT - ( 2019 02:00 )  PTT:25.7 sec, PTT - ( 2019 13:30 )  PTT:25.3 sec  Urinalysis Basic - ( 2019 03:43 )    Color: Yellow / Appearance: Clear / S.025 / pH: x  Gluc: x / Ketone: Negative  / Bili: Negative / Urobili: 0.2 mg/dL   Blood: x / Protein: Trace mg/dL / Nitrite: Negative   Leuk Esterase: Small / RBC: 26-50 /HPF / WBC 6-10 /HPF   Sq Epi: x / Non Sq Epi: Few /HPF / Bacteria: Few /HPF      ABG - ( 2019 04:04 )  pH: 7.34  /  pCO2: 46    /  pO2: 102   / HCO3: 25    / Base Excess: -1.0  /  SaO2: 98    /  LA: 1.0              RADIOLOGY & ADDITIONAL TESTS:  CXR: < from: Xray Chest 1 View-PORTABLE IMMEDIATE (19 @ 14:54) >  Impression:      CHF. Support devices as described.    Immediate poststernotomy appearance of the thorax.    < end of copied text >    EKG: < from: 12 Lead ECG (19 @ 07:05) >    Ventricular Rate 72 BPM    Atrial Rate 72 BPM    P-R Interval 167 ms    QRS Duration 133 ms    Q-T Interval 446 ms    QTC Calculation(Bezet) 488 ms    P Axis 64 degrees    R Axis 83 degrees    T Axis 20 degrees    Diagnosis Line Normal sinus rhythmwith occasional Premature supraventricular complexes  Right bundle branch block  T wave abnormality, consider inferolateral ischemia  Abnormal ECG    Confirmed by Oscar Conteh ([id number]) on 2019 1:17:21 PM    < end of copied text >    Allergies    No Known Allergies    Intolerances      MEDICATIONS  (STANDING):  albumin human  5% IVPB 500 milliLiter(s) IV Intermittent once  ALBUTerol    90 MICROgram(s) HFA Inhaler 2 Puff(s) Inhalation every 6 hours  aspirin enteric coated 325 milliGRAM(s) Oral daily  chlorhexidine 0.12% Liquid 15 milliLiter(s) Oral Mucosa two times a day  chlorhexidine 4% Liquid 1 Application(s) Topical <User Schedule>  dexmedetomidine Infusion 0.25 MICROgram(s)/kG/Hr (4.95 mL/Hr) IV Continuous <Continuous>  diltiazem Injectable 10 milliGRAM(s) IV Push once  docusate sodium 100 milliGRAM(s) Oral three times a day  famotidine Injectable 20 milliGRAM(s) IV Push every 12 hours  guaiFENesin  milliGRAM(s) Oral every 12 hours  insulin regular Infusion 1 Unit(s)/Hr (1 mL/Hr) IV Continuous <Continuous>  ipratropium 17 MICROgram(s) HFA Inhaler 2 Puff(s) Inhalation every 6 hours  magnesium sulfate  IVPB 1 Gram(s) IV Intermittent every 12 hours  meperidine     Injectable 25 milliGRAM(s) IV Push once  metoprolol tartrate 25 milliGRAM(s) Oral two times a day  niCARdipine Infusion 5 mG/Hr (25 mL/Hr) IV Continuous <Continuous>  nitroglycerin  Infusion 5 MICROgram(s)/Min (1.5 mL/Hr) IV Continuous <Continuous>  norepinephrine Infusion 0.05 MICROgram(s)/kG/Min (7.425 mL/Hr) IV Continuous <Continuous>  propofol Infusion 10 MICROgram(s)/kG/Min (4.752 mL/Hr) IV Continuous <Continuous>  sodium chloride 0.9%. 1000 milliLiter(s) (20 mL/Hr) IV Continuous <Continuous>  sodium chloride 0.9%. 1000 milliLiter(s) (50 mL/Hr) IV Continuous <Continuous>    MEDICATIONS  (PRN):  ondansetron  IVPB 4 milliGRAM(s) IV Intermittent once PRN Nausea and/or Vomiting  traMADol 50 milliGRAM(s) Oral every 8 hours PRN Moderate Pain (4 - 6)    Home Medications:  aspirin 81 mg oral tablet: 1 tab(s) orally once a day (2019 07:30)  atorvastatin 20 mg oral tablet: 1 tab(s) orally once a day (2019 07:30)  isosorbide mononitrate 30 mg oral tablet, extended release: 1 tab(s) orally once a day (in the morning) (2019 07:30)  nitroglycerin 0.4 mg sublingual tablet: pt took in cath lab bathroom in cath lab (2019 07:30)  propranolol 60 mg oral capsule, extended release: 1 cap(s) orally once a day (2019 07:30)  Synthroid 50 mcg (0.05 mg) oral tablet: 1 tab(s) orally once a day (2019 07:30)    Pharmacologic DVT Prophylaxis [] YES, [x] NO: Contraindication: thrombocytopenia.   SCD's: YES b/l    GI Prophylaxis: Protonix [], Pepcid [x]    Post-Op Beta-Blockers: [x] Yes, [] No: contraindication:  Post-Op Nitrate: [x] Yes, [] No: contraindication:  Post-Op Aspirin:  [] Yes, [x] No: contraindication: pt thrombocytopenic.   Post-Op Statin:  [] Yes, [x] No: contraindication: elevated LFT's will resume when LFT's are normal    Ambulation/Activity Status: OOB - ch/ ambulate with assistance    Assessment/Plan:  87y Male status-post CABG POD #1. Pt stable.   - Case and plan discussed with CTU Intensivist and CT Surgeon - Dr. Edmondson   - Continue CTU supportive care and ongoing plan of care as per continuing CTU rounds.   - Continue DVT/GI prophylaxis  - Incentive Spirometry 10 times an hour  - Continue to advance physical activity as tolerated and continue PT/OT as directed  1. CAD: Continue  BB, will start ASA when platelets reach 90  2. HTN: controlled,  3. DLD: elevated LFT's- will restart Statins when LFT's normalize  4. Hypothyroidism: will resume synthroid 50mcq daily  5. DM/Glucose Control: continue insulin drip.  6. Fluid status- bolus albumin 500cc x 1.   7. Thrombocytopenia: Improving however platelets still low. No SQ heparin or ASA until platelets 90.    Social Service Disposition:

## 2019-06-29 NOTE — PROGRESS NOTE ADULT - ATTENDING COMMENTS
POD 1 after C4IMA  doing well  IAbP removed yesterday  pt extubated  d/c swan  stable hemodynamics, off drips  start ASA  hold SQH as plts < 80K  start BB  hold diuresis  Cont pulmonary toilet, Chest PT, pain control, Incentive spirometry  OOB ambulate  case d/w CTU team POD 1 after C4IMA  doing well  IAbP removed yesterday  pt extubated  d/c swan  stable hemodynamics, off drips  start ASA  hold SQH as plts < 80K  start BB  hold diuresis  chest tubes will d/c if low output throughout the day  Cont pulmonary toilet, Chest PT, pain control, Incentive spirometry  OOB ambulate  case d/w CTU team

## 2019-06-29 NOTE — PROGRESS NOTE ADULT - SUBJECTIVE AND OBJECTIVE BOX
LILIA SIERRA  MRN#: [id number]  Subjective:  Patient was seen and evalauted on AM rounds     OBJECTIVE:  ICU Vital Signs Last 24 Hrs  T(C): 37.4 (2019 08:30), Max: 37.7 (2019 00:00)  T(F): 99.3 (2019 08:30), Max: 99.9 (2019 00:00)  HR: 66 (2019 10:30) (66 - 83)  BP: 118/56 (2019 19:30) (93/46 - 118/56)  BP(mean): 80 (2019 19:30) (65 - 80)  ABP: 125/54 (2019 10:30) (86/28 - 228/228)  ABP(mean): 76 (2019 10:30) (53 - 228)  RR: 17 (2019 10:30) (10 - 24)  SpO2: 97% (2019 10:30) (94% - 100%)       @ 07: @ 07:00  --------------------------------------------------------  IN: 2116 mL / OUT: 2085 mL / NET: 31 mL     @ 07: @ 11:09  --------------------------------------------------------  IN: 203 mL / OUT: 144 mL / NET: 59 mL      CAPILLARY BLOOD GLUCOSE  139 (2019 08:57)      POCT Blood Glucose.: 139 mg/dL (2019 08:57)      PHYSICAL EXAM:Daily     Daily Weight in k.4 (2019 07:00)  General: WN/WD NAD    HEENT:     + NCAT  + EOMI  - Conjuctival edema   - Icterus   - Thrush   - ETT  - NGT/OGT    Neck:         + FROM    - JVD     - Nodes     - Masses    + Mid-line trachea   - Tracheostomy    Chest:         - Sternal click  - Sternal drainage  + Pacing wires  + Chest tubes  - SubQ emphysema    Lungs:          + CTA   - Rhonchi    - Rales    - Wheezing     - Decreased BS   - Dullness R L    Cardiac:       + S1 + S2    + RRR   - Irregular   - S3  - S4    - Murmurs   - Rub   - Hamman’s sign     Abdomen:    + BS     + Soft    + Non-tender     - Distended    - Organomegaly  - PEG    Extremities:   - Cyanosis U/L   - Clubbing  U/L  - LE/UE Edema   + Capillary refill    + Pulses     Neuro:        + Awake   +  Alert   - Confused   - Lethargic   - Sedated   - Generalized Weakness    Skin:        - Rashes    - Erythema   + Normal incisions   + IV sites intact  - Sacral decubitus    HOSPITAL MEDICATIONS:  MEDICATIONS  (STANDING):  albumin human  5% IVPB 500 milliLiter(s) IV Intermittent once  ALBUTerol    90 MICROgram(s) HFA Inhaler 2 Puff(s) Inhalation every 6 hours  aspirin enteric coated 325 milliGRAM(s) Oral daily  chlorhexidine 4% Liquid 1 Application(s) Topical <User Schedule>  diltiazem Injectable 10 milliGRAM(s) IV Push once  docusate sodium 100 milliGRAM(s) Oral three times a day  guaiFENesin  milliGRAM(s) Oral every 12 hours  insulin regular Infusion 1 Unit(s)/Hr (1 mL/Hr) IV Continuous <Continuous>  ipratropium 17 MICROgram(s) HFA Inhaler 2 Puff(s) Inhalation every 6 hours  magnesium sulfate  IVPB 1 Gram(s) IV Intermittent every 12 hours  metoprolol tartrate 25 milliGRAM(s) Oral two times a day  sodium chloride 0.9%. 1000 milliLiter(s) (20 mL/Hr) IV Continuous <Continuous>    MEDICATIONS  (PRN):  traMADol 50 milliGRAM(s) Oral every 8 hours PRN Moderate Pain (4 - 6)      LABS:                        10.0   6.62  )-----------( 72       ( 2019 02:00 )             30.5    06-    139  |  106  |  17  ----------------------------<  136<H>  4.4   |  22  |  1.2    Ca    7.8<L>      2019 02:00  Mg     2.6     -    TPro  5.0<L>  /  Alb  3.2<L>  /  TBili  0.6  /  DBili  x   /  AST  134<H>  /  ALT  66<H>  /  AlkPhos  75  06-    PT/INR - ( 2019 02:00 )   PT: 14.60 sec;   INR: 1.27 ratio         PTT - ( 2019 02:00 )  PTT:25.7 sec LIVER FUNCTIONS - ( 2019 02:00 )  Alb: 3.2 g/dL / Pro: 5.0 g/dL / ALK PHOS: 75 U/L / ALT: 66 U/L / AST: 134 U/L / GGT: x           Urinalysis Basic - ( 2019 03:43 )    Color: Yellow / Appearance: Clear / S.025 / pH: x  Gluc: x / Ketone: Negative  / Bili: Negative / Urobili: 0.2 mg/dL   Blood: x / Protein: Trace mg/dL / Nitrite: Negative   Leuk Esterase: Small / RBC: 26-50 /HPF / WBC 6-10 /HPF   Sq Epi: x / Non Sq Epi: Few /HPF / Bacteria: Few /HPF        RADIOLOGY:  X Reviewed and interpreted by me: pulmonary vascular congestion    CARDIOPULMONARY DYSFUNCTION  - Respiratory status required supplemental oxygen & the following of continuous pulse oximetry for support & to prevent decompensation  - Continued early mobilization as tolerated  - Addressed analgesic regimen to optimize function    PREVENTION-PROPHYLAXIS  - ASA continued for graft occlusion-thromboembolism prophylaxis  - VTE prophylaxis-Venodyne boots  - Protonix/Pepcid maintained for GI bleeding prophylaxis  - Lopressor initiated for atrial fibrillation prophylaxis  - Metabolic stability & infection prophylaxis required review and adjustment of regular Insulin sliding scale and gylcemic regimen while following serial glucose levels to help achieve & maintain euglycemia  - Reviewed & addressed surgical site infection prophylaxis regimen

## 2019-06-29 NOTE — PROGRESS NOTE ADULT - SUBJECTIVE AND OBJECTIVE BOX
SUBJECTIVE ASSESSMENT:  pt has no major complaints    Vital Signs Last 24 Hrs  T(C): 37.4 (2019 08:30), Max: 37.7 (2019 00:00)  T(F): 99.3 (2019 08:30), Max: 99.9 (2019 00:00)  HR: 66 (2019 10:30) (66 - 83)  BP: 118/56 (2019 19:30) (93/46 - 118/56)  BP(mean): 80 (2019 19:30) (65 - 80)  RR: 17 (2019 10:30) (10 - 24)  SpO2: 97% (2019 10:30) (94% - 100%)  19 @ 07:01  -  19 @ 07:00  --------------------------------------------------------  IN: 2116 mL / OUT: 2085 mL / NET: 31 mL    A&OX3 in NAD  CTA bilat  sternum stable, no drainage  nl s1, s2  abd soft/NT/ND  no peripheral edema  SVG harvest site is healing well    LABS:                        10.0   6.62  )-----------( 72       ( 2019 02:00 )             30.5     PT/INR - ( 2019 02:00 )   PT: 14.60 sec;   INR: 1.27 ratio      PTT - ( 2019 02:00 )  PTT:25.7 sec      139  |  106  |  17  ----------------------------<  136<H>  4.4   |  22  |  1.2    Ca    7.8<L>      2019 02:00  Mg     2.6         TPro  5.0<L>  /  Alb  3.2<L>  /  TBili  0.6  /  DBili  x   /  AST  134<H>  /  ALT  66<H>  /  AlkPhos  75      Urinalysis Basic - ( 2019 03:43 )    Color: Yellow / Appearance: Clear / S.025 / pH: x  Gluc: x / Ketone: Negative  / Bili: Negative / Urobili: 0.2 mg/dL   Blood: x / Protein: Trace mg/dL / Nitrite: Negative   Leuk Esterase: Small / RBC: 26-50 /HPF / WBC 6-10 /HPF   Sq Epi: x / Non Sq Epi: Few /HPF / Bacteria: Few /HPF    MEDICATIONS  (STANDING):  albumin human  5% IVPB 500 milliLiter(s) IV Intermittent once  ALBUTerol    90 MICROgram(s) HFA Inhaler 2 Puff(s) Inhalation every 6 hours  aspirin enteric coated 325 milliGRAM(s) Oral daily  ceFAZolin   IVPB 1000 milliGRAM(s) IV Intermittent every 8 hours  docusate sodium 100 milliGRAM(s) Oral three times a day  famotidine Injectable 20 milliGRAM(s) IV Push every 12 hours  guaiFENesin  milliGRAM(s) Oral every 12 hours  insulin regular Infusion 1 Unit(s)/Hr (1 mL/Hr) IV Continuous <Continuous>  ipratropium 17 MICROgram(s) HFA Inhaler 2 Puff(s) Inhalation every 6 hours  metoprolol tartrate 25 milliGRAM(s) Oral two times a day  niCARdipine Infusion 5 mG/Hr (25 mL/Hr) IV Continuous <Continuous>  nitroglycerin  Infusion 5 MICROgram(s)/Min (1.5 mL/Hr) IV Continuous <Continuous>    MEDICATIONS  (PRN):  ondansetron  IVPB 4 milliGRAM(s) IV Intermittent once PRN Nausea and/or Vomiting  traMADol 50 milliGRAM(s) Oral every 8 hours PRN Moderate Pain (4 - 6)

## 2019-06-30 LAB
ALBUMIN SERPL ELPH-MCNC: 3.2 G/DL — LOW (ref 3.5–5.2)
ALP SERPL-CCNC: 80 U/L — SIGNIFICANT CHANGE UP (ref 30–115)
ALT FLD-CCNC: 47 U/L — HIGH (ref 0–41)
ANION GAP SERPL CALC-SCNC: 11 MMOL/L — SIGNIFICANT CHANGE UP (ref 7–14)
APTT BLD: 28.7 SEC — SIGNIFICANT CHANGE UP (ref 27–39.2)
AST SERPL-CCNC: 74 U/L — HIGH (ref 0–41)
BASE EXCESS BLDA CALC-SCNC: 1.7 MMOL/L — SIGNIFICANT CHANGE UP (ref -2–2)
BASOPHILS # BLD AUTO: 0.01 K/UL — SIGNIFICANT CHANGE UP (ref 0–0.2)
BASOPHILS NFR BLD AUTO: 0.2 % — SIGNIFICANT CHANGE UP (ref 0–1)
BILIRUB DIRECT SERPL-MCNC: 0.2 MG/DL — SIGNIFICANT CHANGE UP (ref 0–0.2)
BILIRUB INDIRECT FLD-MCNC: 0.4 MG/DL — SIGNIFICANT CHANGE UP (ref 0.2–1.2)
BILIRUB SERPL-MCNC: 0.6 MG/DL — SIGNIFICANT CHANGE UP (ref 0.2–1.2)
BUN SERPL-MCNC: 21 MG/DL — HIGH (ref 10–20)
CALCIUM SERPL-MCNC: 7.9 MG/DL — LOW (ref 8.5–10.1)
CHLORIDE SERPL-SCNC: 102 MMOL/L — SIGNIFICANT CHANGE UP (ref 98–110)
CO2 SERPL-SCNC: 23 MMOL/L — SIGNIFICANT CHANGE UP (ref 17–32)
CREAT SERPL-MCNC: 1.1 MG/DL — SIGNIFICANT CHANGE UP (ref 0.7–1.5)
EOSINOPHIL # BLD AUTO: 0.03 K/UL — SIGNIFICANT CHANGE UP (ref 0–0.7)
EOSINOPHIL NFR BLD AUTO: 0.5 % — SIGNIFICANT CHANGE UP (ref 0–8)
GLUCOSE BLDC GLUCOMTR-MCNC: 112 MG/DL — HIGH (ref 70–99)
GLUCOSE BLDC GLUCOMTR-MCNC: 122 MG/DL — HIGH (ref 70–99)
GLUCOSE BLDC GLUCOMTR-MCNC: 130 MG/DL — HIGH (ref 70–99)
GLUCOSE BLDC GLUCOMTR-MCNC: 136 MG/DL — HIGH (ref 70–99)
GLUCOSE BLDC GLUCOMTR-MCNC: 138 MG/DL — HIGH (ref 70–99)
GLUCOSE BLDC GLUCOMTR-MCNC: 164 MG/DL — HIGH (ref 70–99)
GLUCOSE SERPL-MCNC: 126 MG/DL — HIGH (ref 70–99)
HCO3 BLDA-SCNC: 26 MMOL/L — SIGNIFICANT CHANGE UP (ref 23–27)
HCT VFR BLD CALC: 28.3 % — LOW (ref 42–52)
HGB BLD-MCNC: 9.1 G/DL — LOW (ref 14–18)
IMM GRANULOCYTES NFR BLD AUTO: 0.3 % — SIGNIFICANT CHANGE UP (ref 0.1–0.3)
INR BLD: 1.23 RATIO — SIGNIFICANT CHANGE UP (ref 0.65–1.3)
LYMPHOCYTES # BLD AUTO: 0.56 K/UL — LOW (ref 1.2–3.4)
LYMPHOCYTES # BLD AUTO: 8.9 % — LOW (ref 20.5–51.1)
MAGNESIUM SERPL-MCNC: 2.5 MG/DL — HIGH (ref 1.8–2.4)
MCHC RBC-ENTMCNC: 29.3 PG — SIGNIFICANT CHANGE UP (ref 27–31)
MCHC RBC-ENTMCNC: 32.2 G/DL — SIGNIFICANT CHANGE UP (ref 32–37)
MCV RBC AUTO: 91 FL — SIGNIFICANT CHANGE UP (ref 80–94)
MONOCYTES # BLD AUTO: 0.72 K/UL — HIGH (ref 0.1–0.6)
MONOCYTES NFR BLD AUTO: 11.4 % — HIGH (ref 1.7–9.3)
NEUTROPHILS # BLD AUTO: 4.98 K/UL — SIGNIFICANT CHANGE UP (ref 1.4–6.5)
NEUTROPHILS NFR BLD AUTO: 78.7 % — HIGH (ref 42.2–75.2)
NRBC # BLD: 0 /100 WBCS — SIGNIFICANT CHANGE UP (ref 0–0)
PCO2 BLDA: 41 MMHG — SIGNIFICANT CHANGE UP (ref 38–42)
PH BLDA: 7.42 — SIGNIFICANT CHANGE UP (ref 7.38–7.42)
PLATELET # BLD AUTO: 66 K/UL — LOW (ref 130–400)
PO2 BLDA: 80 MMHG — SIGNIFICANT CHANGE UP (ref 78–95)
POTASSIUM SERPL-MCNC: 3.8 MMOL/L — SIGNIFICANT CHANGE UP (ref 3.5–5)
POTASSIUM SERPL-SCNC: 3.8 MMOL/L — SIGNIFICANT CHANGE UP (ref 3.5–5)
PROT SERPL-MCNC: 5.1 G/DL — LOW (ref 6–8)
PROTHROM AB SERPL-ACNC: 14.1 SEC — HIGH (ref 9.95–12.87)
RBC # BLD: 3.11 M/UL — LOW (ref 4.7–6.1)
RBC # FLD: 14.6 % — HIGH (ref 11.5–14.5)
SAO2 % BLDA: 97 % — SIGNIFICANT CHANGE UP (ref 94–98)
SODIUM SERPL-SCNC: 136 MMOL/L — SIGNIFICANT CHANGE UP (ref 135–146)
WBC # BLD: 6.32 K/UL — SIGNIFICANT CHANGE UP (ref 4.8–10.8)
WBC # FLD AUTO: 6.32 K/UL — SIGNIFICANT CHANGE UP (ref 4.8–10.8)

## 2019-06-30 PROCEDURE — 99233 SBSQ HOSP IP/OBS HIGH 50: CPT

## 2019-06-30 PROCEDURE — 71045 X-RAY EXAM CHEST 1 VIEW: CPT | Mod: 26

## 2019-06-30 PROCEDURE — 93010 ELECTROCARDIOGRAM REPORT: CPT

## 2019-06-30 RX ORDER — FUROSEMIDE 40 MG
40 TABLET ORAL ONCE
Refills: 0 | Status: COMPLETED | OUTPATIENT
Start: 2019-06-30 | End: 2019-06-30

## 2019-06-30 RX ORDER — GLUCAGON INJECTION, SOLUTION 0.5 MG/.1ML
1 INJECTION, SOLUTION SUBCUTANEOUS ONCE
Refills: 0 | Status: DISCONTINUED | OUTPATIENT
Start: 2019-06-30 | End: 2019-07-05

## 2019-06-30 RX ORDER — DEXTROSE 50 % IN WATER 50 %
15 SYRINGE (ML) INTRAVENOUS ONCE
Refills: 0 | Status: DISCONTINUED | OUTPATIENT
Start: 2019-06-30 | End: 2019-07-02

## 2019-06-30 RX ORDER — IPRATROPIUM/ALBUTEROL SULFATE 18-103MCG
3 AEROSOL WITH ADAPTER (GRAM) INHALATION EVERY 6 HOURS
Refills: 0 | Status: DISCONTINUED | OUTPATIENT
Start: 2019-06-30 | End: 2019-07-05

## 2019-06-30 RX ORDER — SODIUM CHLORIDE 9 MG/ML
1000 INJECTION, SOLUTION INTRAVENOUS
Refills: 0 | Status: DISCONTINUED | OUTPATIENT
Start: 2019-06-30 | End: 2019-07-04

## 2019-06-30 RX ORDER — DEXTROSE 50 % IN WATER 50 %
25 SYRINGE (ML) INTRAVENOUS ONCE
Refills: 0 | Status: DISCONTINUED | OUTPATIENT
Start: 2019-06-30 | End: 2019-07-04

## 2019-06-30 RX ORDER — POTASSIUM CHLORIDE 20 MEQ
20 PACKET (EA) ORAL ONCE
Refills: 0 | Status: COMPLETED | OUTPATIENT
Start: 2019-06-30 | End: 2019-06-30

## 2019-06-30 RX ORDER — DEXTROSE 50 % IN WATER 50 %
12.5 SYRINGE (ML) INTRAVENOUS ONCE
Refills: 0 | Status: DISCONTINUED | OUTPATIENT
Start: 2019-06-30 | End: 2019-07-04

## 2019-06-30 RX ORDER — DEXTROSE 50 % IN WATER 50 %
25 SYRINGE (ML) INTRAVENOUS ONCE
Refills: 0 | Status: DISCONTINUED | OUTPATIENT
Start: 2019-06-30 | End: 2019-07-02

## 2019-06-30 RX ORDER — ONDANSETRON 8 MG/1
4 TABLET, FILM COATED ORAL ONCE
Refills: 0 | Status: COMPLETED | OUTPATIENT
Start: 2019-06-30 | End: 2019-06-30

## 2019-06-30 RX ORDER — INSULIN LISPRO 100/ML
VIAL (ML) SUBCUTANEOUS
Refills: 0 | Status: DISCONTINUED | OUTPATIENT
Start: 2019-06-30 | End: 2019-07-04

## 2019-06-30 RX ORDER — AMIODARONE HYDROCHLORIDE 400 MG/1
400 TABLET ORAL EVERY 8 HOURS
Refills: 0 | Status: DISCONTINUED | OUTPATIENT
Start: 2019-06-30 | End: 2019-07-03

## 2019-06-30 RX ORDER — ASPIRIN/CALCIUM CARB/MAGNESIUM 324 MG
81 TABLET ORAL DAILY
Refills: 0 | Status: DISCONTINUED | OUTPATIENT
Start: 2019-06-30 | End: 2019-07-03

## 2019-06-30 RX ADMIN — AMIODARONE HYDROCHLORIDE 400 MILLIGRAM(S): 400 TABLET ORAL at 08:55

## 2019-06-30 RX ADMIN — Medication 1: at 11:54

## 2019-06-30 RX ADMIN — Medication 50 MICROGRAM(S): at 06:49

## 2019-06-30 RX ADMIN — CHLORHEXIDINE GLUCONATE 1 APPLICATION(S): 213 SOLUTION TOPICAL at 20:00

## 2019-06-30 RX ADMIN — FAMOTIDINE 20 MILLIGRAM(S): 10 INJECTION INTRAVENOUS at 11:56

## 2019-06-30 RX ADMIN — Medication 100 MILLIGRAM(S): at 06:07

## 2019-06-30 RX ADMIN — Medication 100 MILLIGRAM(S): at 13:16

## 2019-06-30 RX ADMIN — Medication 600 MILLIGRAM(S): at 17:53

## 2019-06-30 RX ADMIN — Medication 100 GRAM(S): at 05:47

## 2019-06-30 RX ADMIN — AMIODARONE HYDROCHLORIDE 400 MILLIGRAM(S): 400 TABLET ORAL at 13:16

## 2019-06-30 RX ADMIN — Medication 81 MILLIGRAM(S): at 13:15

## 2019-06-30 RX ADMIN — Medication 20 MILLIEQUIVALENT(S): at 08:55

## 2019-06-30 RX ADMIN — ONDANSETRON 4 MILLIGRAM(S): 8 TABLET, FILM COATED ORAL at 06:15

## 2019-06-30 RX ADMIN — Medication 40 MILLIGRAM(S): at 08:55

## 2019-06-30 RX ADMIN — Medication 25 MILLIGRAM(S): at 17:53

## 2019-06-30 RX ADMIN — Medication 100 GRAM(S): at 17:53

## 2019-06-30 RX ADMIN — Medication 100 MILLIEQUIVALENT(S): at 06:39

## 2019-06-30 RX ADMIN — Medication 600 MILLIGRAM(S): at 06:07

## 2019-06-30 RX ADMIN — AMIODARONE HYDROCHLORIDE 400 MILLIGRAM(S): 400 TABLET ORAL at 21:40

## 2019-06-30 RX ADMIN — Medication 3 MILLILITER(S): at 21:12

## 2019-06-30 RX ADMIN — Medication 100 MILLIGRAM(S): at 21:40

## 2019-06-30 RX ADMIN — Medication 25 MILLIGRAM(S): at 06:07

## 2019-06-30 NOTE — PROGRESS NOTE ADULT - ATTENDING COMMENTS
POD 2 after C4IMA  doing well  afib overnight  loaded with amio  in SR now  cont ASA  hold SQH as plts < 80K  cont bb  start diuresis  Cont pulmonary toilet, Chest PT, pain control, Incentive spirometry  OOB ambulate  case d/w CTU team

## 2019-06-30 NOTE — PHYSICAL THERAPY INITIAL EVALUATION ADULT - LIVES WITH, PROFILE
in pvt home 5 + 7 stairs to enter with right HR. Pt states he has a chair lift for 7 steps only, no steps inside/spouse

## 2019-06-30 NOTE — PHYSICAL THERAPY INITIAL EVALUATION ADULT - GENERAL OBSERVATIONS, REHAB EVAL
10:35-11:14 Pt encountered sitting in b/s chair with tele, IV lock, radial A-line, vega, 2L/m O2 NC, B/L SCDs, in NAD. Pt with c/o "sore throat" SUHAS jung. BP: 145/66, HR: 65bpm, SpO2 95% on O2. Pt left same as found with tele, IV lock, radial A-line, vega, 2L/m O2 NC, B/L SCDs, in NAD. BP: 168/78, HR: 69bpm, SpO2 95% on O2.

## 2019-06-30 NOTE — PHYSICAL THERAPY INITIAL EVALUATION ADULT - GAIT DEVIATIONS NOTED, PT EVAL
decreased adolfo/decreased stride length/decreased weight-shifting ability/increased time in double stance/decreased velocity of limb motion/decreased step length

## 2019-06-30 NOTE — PROGRESS NOTE ADULT - SUBJECTIVE AND OBJECTIVE BOX
OPERATIVE PROCEDURE(s):                POD #                       87yMale  SURGEON(s): LEE Edmondson  SUBJECTIVE ASSESSMENT:   Vital Signs Last 24 Hrs  T(F): 98.8 (2019 19:54), Max: 99.3 (2019 08:30)  HR: 99 (2019 07:00) (66 - 111)  BP: 147/66 (2019 19:54) (147/66 - 147/66)  BP(mean): 95 (2019 19:54) (95 - 95)  ABP: 141/59 (2019 07:00) (24/24 - 180/54)  ABP(mean): 88 (2019 07:00)  RR: 22 (2019 07:00) (12 - 29)  SpO2: 96% (2019 07:00) (93% - 100%)  CVP(mm Hg): 116 (2019 11:00)  CVP(cm H2O): --  CO: 4.2 (2019 10:30)  CI: 2.1 (2019 10:30)  PA: 0/0 (2019 11:00)  SVR: 1065 (2019 10:30)    I&O's Detail    2019 07:  -  2019 07:00  --------------------------------------------------------  IN:    Albumin 5%  - 500 mL: 500 mL    amiodarone Infusion: 233.1 mL    insulin regular Infusion: 41 mL    IV PiggyBack: 500 mL    nitroglycerin  Infusion: 12 mL    Oral Fluid: 1130 mL    sodium chloride 0.9%.: 440 mL  Total IN: 2856.1 mL    OUT:    Chest Tube: 80 mL    Chest Tube: 70 mL    Indwelling Catheter - Urethral: 1571 mL  Total OUT: 1721 mL        Net:   I&O's Detail    2019 07:  -  2019 07:00  --------------------------------------------------------  Total NET: 31 mL      2019 07:01  -  2019 07:00  --------------------------------------------------------  Total NET: 1135.1 mL        CAPILLARY BLOOD GLUCOSE  115 (2019 19:17)  127 (2019 15:30)  158 (2019 13:53)  126 (2019 12:03)  139 (2019 08:57)      POCT Blood Glucose.: 112 mg/dL (2019 04:01)  POCT Blood Glucose.: 122 mg/dL (2019 01:47)  POCT Blood Glucose.: 130 mg/dL (2019 00:05)  POCT Blood Glucose.: 87 mg/dL (2019 21:28)  POCT Blood Glucose.: 115 mg/dL (2019 19:17)  POCT Blood Glucose.: 127 mg/dL (2019 15:31)  POCT Blood Glucose.: 158 mg/dL (2019 13:53)  POCT Blood Glucose.: 126 mg/dL (2019 12:03)  POCT Blood Glucose.: 139 mg/dL (2019 08:57)    Physical Exam:  General: NAD; A&Ox3/Patient is intubated and sedated  Cardiac: S1/S2, RRR, no murmur, no rubs  Lungs: unlabored respirations, CTA b/l, no wheeze, no rales, no crackles  Abdomen: Soft/NT/ND; positive bowel sounds x 4  Sternum: Intact, no click, incision healing well with no drainage  Incisions: Incisions clean/dry/intact  Extremities: No edema b/l lower extremities; good capillary refill; no cyanosis; palpable 1+ pedal pulses b/l    Central Venous Catheter: Yes[]  No[] , If Yes indication:           Day #  Silva Catheter: Yes  [] , No  [] , If yes indication:                      Day #  NGT: Yes [] No [] ,    If Yes Placement:                                     Day #  EPICARDIAL WIRES:  [] YES [] NO                                              Day #  BOWEL MOVEMENT:  [] YES [] NO, If No, Timing since last BM:      Day #  CHEST TUBE(Left/Right):  [] YES [] NO, If yes -  AIR LEAKS:  [] YES [] NO        LABS:                        9.1<L>  6.32  )-----------( 66<L>    ( 2019 00:30 )             28.3<L>                        10.0<L>  6.62  )-----------( 72<L>    ( 2019 02:00 )             30.5<L>    0630    136  |  102  |  21<H>  ----------------------------<  126<H>  3.8   |  23  |  1.1      139  |  106  |  17  ----------------------------<  136<H>  4.4   |  22  |  1.2    Ca    7.9<L>      2019 00:30  Mg     2.5     06-30    TPro  5.1<L> [6.0 - 8.0]  /  Alb  3.2<L> [3.5 - 5.2]  /  TBili  0.6 [0.2 - 1.2]  /  DBili  0.2 [0.0 - 0.2]  /  AST  74<H> [0 - 41]  /  ALT  47<H> [0 - 41]  /  AlkPhos  80 [30 - 115]  06-30    PT/INR - ( 2019 00:30 )   PT: ;   INR: 1.23 ratio         PT/INR - ( 2019 02:00 )   PT: ;   INR: 1.27 ratio         PTT - ( 2019 00:30 )  PTT:28.7 sec, PTT - ( 2019 02:00 )  PTT:25.7 sec  Urinalysis Basic - ( 2019 03:43 )    Color: Yellow / Appearance: Clear / S.025 / pH: x  Gluc: x / Ketone: Negative  / Bili: Negative / Urobili: 0.2 mg/dL   Blood: x / Protein: Trace mg/dL / Nitrite: Negative   Leuk Esterase: Small / RBC: 26-50 /HPF / WBC 6-10 /HPF   Sq Epi: x / Non Sq Epi: Few /HPF / Bacteria: Few /HPF      ABG - ( 2019 05:23 )  pH: 7.42  /  pCO2: 41    /  pO2: 80    / HCO3: 26    / Base Excess: 1.7   /  SaO2: 97    /  LA: 0.8              RADIOLOGY & ADDITIONAL TESTS:  CXR:  EKG:  MEDICATIONS  (STANDING):  ALBUTerol    90 MICROgram(s) HFA Inhaler 2 Puff(s) Inhalation every 6 hours  amiodarone Infusion 1 mG/Min (33.333 mL/Hr) IV Continuous <Continuous>  aspirin enteric coated 325 milliGRAM(s) Oral daily  chlorhexidine 4% Liquid 1 Application(s) Topical <User Schedule>  docusate sodium 100 milliGRAM(s) Oral three times a day  famotidine    Tablet 20 milliGRAM(s) Oral daily  guaiFENesin  milliGRAM(s) Oral every 12 hours  insulin regular Infusion 1 Unit(s)/Hr (1 mL/Hr) IV Continuous <Continuous>  ipratropium 17 MICROgram(s) HFA Inhaler 2 Puff(s) Inhalation every 6 hours  levothyroxine 50 MICROGram(s) Oral daily  magnesium sulfate  IVPB 1 Gram(s) IV Intermittent every 12 hours  metoprolol tartrate 25 milliGRAM(s) Oral two times a day  sodium chloride 0.9%. 1000 milliLiter(s) (20 mL/Hr) IV Continuous <Continuous>    MEDICATIONS  (PRN):  ketorolac   Injectable 15 milliGRAM(s) IV Push every 6 hours PRN Moderate Pain (4 - 6)    HEPARIN:  [] YES [] NO  Dose: XX UNITS/HR UNITS Q8H  LOVENOX:[] YES [] NO  Dose: XX mg Q24H  COUMADIN: []  YES [] NO  Dose: XX mg  Q24H  SCD's: YES b/l  GI Prophylaxis: Protonix [], Pepcid [], None [], (Contra-indication:.....)    Post-Op Beta-Blockers: Yes [], No[], If No, then contraindication:  Post-Op Aspirin: Yes [],  No [], If No, then contraindication:  Post-Op Statin: Yes [], No[], If No, then contraindication:  Allergies    No Known Allergies    Intolerances      Ambulation/Activity Status:    Assessment/Plan:  87y Male status-post .....  - Case and plan discussed with CTU Intensivist and CT Surgeon - Dr. Anderson/Casandra/Debo   - Continue CTU supportive care    - Continue DVT/GI prophylaxis  - Incentive Spirometry 10 times an hour  - Continue to advance physical activity as tolerated and continue PT/OT as directed  1. CAD: Continue ASA, statin, BB  2. HTN:   3. A. Fib:   4. COPD/Hypoxia:   5. DM/Glucose Control:     Social Service Disposition: OPERATIVE PROCEDURE(s):                POD #                       87yMale  SURGEON(s): LEE Edmondson  SUBJECTIVE ASSESSMENT:   Vital Signs Last 24 Hrs  T(F): 98.8 (2019 19:54), Max: 99.3 (2019 08:30)  HR: 99 (2019 07:00) (66 - 111)  BP: 147/66 (2019 19:54) (147/66 - 147/66)  BP(mean): 95 (2019 19:54) (95 - 95)  ABP: 141/59 (2019 07:00) (24/24 - 180/54)  ABP(mean): 88 (2019 07:00)  RR: 22 (2019 07:00) (12 - 29)  SpO2: 96% (2019 07:00) (93% - 100%)  CVP(mm Hg): 116 (2019 11:00)  CVP(cm H2O): --  CO: 4.2 (2019 10:30)  CI: 2.1 (2019 10:30)  PA: 0/0 (2019 11:00)  SVR: 1065 (2019 10:30)    I&O's Detail    2019 07:  -  2019 07:00  --------------------------------------------------------  IN:    Albumin 5%  - 500 mL: 500 mL    amiodarone Infusion: 233.1 mL    insulin regular Infusion: 41 mL    IV PiggyBack: 500 mL    nitroglycerin  Infusion: 12 mL    Oral Fluid: 1130 mL    sodium chloride 0.9%.: 440 mL  Total IN: 2856.1 mL    OUT:    Chest Tube: 80 mL    Chest Tube: 70 mL    Indwelling Catheter - Urethral: 1571 mL  Total OUT: 1721 mL    Net:   I&O's Detail    2019 07:  -  2019 07:00  --------------------------------------------------------  Total NET: 31 mL      2019 07:01  -  2019 07:00  --------------------------------------------------------  Total NET: 1135.1 mL    CAPILLARY BLOOD GLUCOSE  115 (2019 19:17)  127 (2019 15:30)  158 (2019 13:53)  126 (2019 12:03)  139 (2019 08:57)    POCT Blood Glucose.: 112 mg/dL (2019 04:01)  POCT Blood Glucose.: 122 mg/dL (2019 01:47)  POCT Blood Glucose.: 130 mg/dL (2019 00:05)  POCT Blood Glucose.: 87 mg/dL (2019 21:28)  POCT Blood Glucose.: 115 mg/dL (2019 19:17)  POCT Blood Glucose.: 127 mg/dL (2019 15:31)  POCT Blood Glucose.: 158 mg/dL (2019 13:53)  POCT Blood Glucose.: 126 mg/dL (2019 12:03)  POCT Blood Glucose.: 139 mg/dL (2019 08:57)    Physical Exam:  General: NAD; A&Ox3/Patient is intubated and sedated  Cardiac: S1/S2, RRR, no murmur, no rubs  Lungs: unlabored respirations, CTA b/l, no wheeze, no rales, no crackles  Abdomen: Soft/NT/ND; positive bowel sounds x 4  Sternum: Intact, no click, incision healing well with no drainage  Incisions: Incisions clean/dry/intact  Extremities: No edema b/l lower extremities; good capillary refill; no cyanosis; palpable 1+ pedal pulses b/l    LABS:                        9.1<L>  6.32  )-----------( 66<L>    ( 2019 00:30 )             28.3<L>                        10.0<L>  6.62  )-----------( 72<L>    ( 2019 02:00 )             30.5<L>    06-30    136  |  102  |  21<H>  ----------------------------<  126<H>  3.8   |  23  |  1.1      139  |  106  |  17  ----------------------------<  136<H>  4.4   |  22  |  1.2    Ca    7.9<L>      2019 00:30  Mg     2.5         TPro  5.1<L> [6.0 - 8.0]  /  Alb  3.2<L> [3.5 - 5.2]  /  TBili  0.6 [0.2 - 1.2]  /  DBili  0.2 [0.0 - 0.2]  /  AST  74<H> [0 - 41]  /  ALT  47<H> [0 - 41]  /  AlkPhos  80 [30 - 115]      PT/INR - ( 2019 00:30 )   PT: ;   INR: 1.23 ratio         PT/INR - ( 2019 02:00 )   PT: ;   INR: 1.27 ratio       PTT - ( 2019 00:30 )  PTT:28.7 sec, PTT - ( 2019 02:00 )  PTT:25.7 sec  Urinalysis Basic - ( 2019 03:43 )    Color: Yellow / Appearance: Clear / S.025 / pH: x  Gluc: x / Ketone: Negative  / Bili: Negative / Urobili: 0.2 mg/dL   Blood: x / Protein: Trace mg/dL / Nitrite: Negative   Leuk Esterase: Small / RBC: 26-50 /HPF / WBC 6-10 /HPF   Sq Epi: x / Non Sq Epi: Few /HPF / Bacteria: Few /HPF      ABG - ( 2019 05:23 )  pH: 7.42  /  pCO2: 41    /  pO2: 80    / HCO3: 26    / Base Excess: 1.7   /  SaO2: 97    /  LA: 0.8      RADIOLOGY & ADDITIONAL TESTS:  CXR:  EKG:  MEDICATIONS  (STANDING):  ALBUTerol    90 MICROgram(s) HFA Inhaler 2 Puff(s) Inhalation every 6 hours  amiodarone Infusion 1 mG/Min (33.333 mL/Hr) IV Continuous <Continuous>  aspirin enteric coated 325 milliGRAM(s) Oral daily  chlorhexidine 4% Liquid 1 Application(s) Topical <User Schedule>  docusate sodium 100 milliGRAM(s) Oral three times a day  famotidine    Tablet 20 milliGRAM(s) Oral daily  guaiFENesin  milliGRAM(s) Oral every 12 hours  insulin regular Infusion 1 Unit(s)/Hr (1 mL/Hr) IV Continuous <Continuous>  ipratropium 17 MICROgram(s) HFA Inhaler 2 Puff(s) Inhalation every 6 hours  levothyroxine 50 MICROGram(s) Oral daily  magnesium sulfate  IVPB 1 Gram(s) IV Intermittent every 12 hours  metoprolol tartrate 25 milliGRAM(s) Oral two times a day  sodium chloride 0.9%. 1000 milliLiter(s) (20 mL/Hr) IV Continuous <Continuous>    MEDICATIONS  (PRN):  ketorolac   Injectable 15 milliGRAM(s) IV Push every 6 hours PRN Moderate Pain (4 - 6)    HEPARIN:  [] YES [] NO  Dose: XX UNITS/HR UNITS Q8H  LOVENOX:[] YES [] NO  Dose: XX mg Q24H  COUMADIN: []  YES [] NO  Dose: XX mg  Q24H  SCD's: YES b/l  GI Prophylaxis: Protonix [], Pepcid [], None [], (Contra-indication:.....)    Post-Op Beta-Blockers: Yes [], No[], If No, then contraindication:  Post-Op Aspirin: Yes [],  No [], If No, then contraindication:  Post-Op Statin: Yes [], No[], If No, then contraindication:  Allergies    - Continue DVT/GI prophylaxis  - Incentive Spirometry 10 times an hour  - Continue to advance physical activity as tolerated and continue PT/OT as directed  1. CAD: Continue ASA, statin, BB  2. HTN:   3. A. Fib:   4. COPD/Hypoxia:   5. DM/Glucose Control:     Social Service Disposition: OPERATIVE PROCEDURE(s):  CABGx4              POD #        2               87yMale  SURGEON(s): LEE Edmondson  SUBJECTIVE ASSESSMENT: pt seen and examined. no acute complaints at this time.   Vital Signs Last 24 Hrs  T(F): 98.8 (2019 19:54), Max: 99.3 (2019 08:30)  HR: 99 (2019 07:00) (66 - 111)  BP: 147/66 (2019 19:54) (147/66 - 147/66)  BP(mean): 95 (2019 19:54) (95 - 95)  ABP: 141/59 (2019 07:00) (24/24 - 180/54)  ABP(mean): 88 (2019 07:00)  RR: 22 (2019 07:00) (12 - 29)  SpO2: 96% (2019 07:00) (93% - 100%)  CVP(mm Hg): 116 (2019 11:00)  CVP(cm H2O): --  CO: 4.2 (2019 10:30)  CI: 2.1 (2019 10:30)  PA: 0/0 (2019 11:00)  SVR: 1065 (2019 10:30)    I&O's Detail    2019 07:  -  2019 07:00  --------------------------------------------------------  IN:    Albumin 5%  - 500 mL: 500 mL    amiodarone Infusion: 233.1 mL    insulin regular Infusion: 41 mL    IV PiggyBack: 500 mL    nitroglycerin  Infusion: 12 mL    Oral Fluid: 1130 mL    sodium chloride 0.9%.: 440 mL  Total IN: 2856.1 mL    OUT:    Chest Tube: 80 mL    Chest Tube: 70 mL    Indwelling Catheter - Urethral: 1571 mL  Total OUT: 1721 mL    Net:   I&O's Detail    2019 07:  -  2019 07:00  --------------------------------------------------------  Total NET: 31 mL      2019 07:01  -  2019 07:00  --------------------------------------------------------  Total NET: 1135.1 mL    CAPILLARY BLOOD GLUCOSE  115 (2019 19:17)  127 (2019 15:30)  158 (2019 13:53)  126 (2019 12:03)  139 (2019 08:57)    POCT Blood Glucose.: 112 mg/dL (2019 04:01)  POCT Blood Glucose.: 122 mg/dL (2019 01:47)  POCT Blood Glucose.: 130 mg/dL (2019 00:05)  POCT Blood Glucose.: 87 mg/dL (2019 21:28)  POCT Blood Glucose.: 115 mg/dL (2019 19:17)  POCT Blood Glucose.: 127 mg/dL (2019 15:31)  POCT Blood Glucose.: 158 mg/dL (2019 13:53)  POCT Blood Glucose.: 126 mg/dL (2019 12:03)  POCT Blood Glucose.: 139 mg/dL (2019 08:57)    Physical Exam:  General: NAD; A&Ox3  Cardiac: S1/S2, RRR, no murmur, no rubs  Lungs: decreased bs at bases bilaterally  Abdomen: Soft/NT/ND; positive bowel sounds x 4  Sternum: Intact, no click, incision healing well with no drainage  Incisions: Incisions clean/dry/intact  Extremities:  edema b/l lower extremities; good capillary refill; no cyanosis; palpable 1+ pedal pulses b/l    LABS:                        9.1<L>  6.32  )-----------( 66<L>    ( 2019 00:30 )             28.3<L>                        10.0<L>  6.62  )-----------( 72<L>    ( 2019 02:00 )             30.5<L>    06-30    136  |  102  |  21<H>  ----------------------------<  126<H>  3.8   |  23  |  1.1      139  |  106  |  17  ----------------------------<  136<H>  4.4   |  22  |  1.2    Ca    7.9<L>      2019 00:30  Mg     2.5     0630    TPro  5.1<L> [6.0 - 8.0]  /  Alb  3.2<L> [3.5 - 5.2]  /  TBili  0.6 [0.2 - 1.2]  /  DBili  0.2 [0.0 - 0.2]  /  AST  74<H> [0 - 41]  /  ALT  47<H> [0 - 41]  /  AlkPhos  80 [30 - 115]  30    PT/INR - ( 2019 00:30 )   PT: ;   INR: 1.23 ratio         PT/INR - ( 2019 02:00 )   PT: ;   INR: 1.27 ratio       PTT - ( 2019 00:30 )  PTT:28.7 sec, PTT - ( 2019 02:00 )  PTT:25.7 sec  Urinalysis Basic - ( 2019 03:43 )    Color: Yellow / Appearance: Clear / S.025 / pH: x  Gluc: x / Ketone: Negative  / Bili: Negative / Urobili: 0.2 mg/dL   Blood: x / Protein: Trace mg/dL / Nitrite: Negative   Leuk Esterase: Small / RBC: 26-50 /HPF / WBC 6-10 /HPF   Sq Epi: x / Non Sq Epi: Few /HPF / Bacteria: Few /HPF      ABG - ( 2019 05:23 )  pH: 7.42  /  pCO2: 41    /  pO2: 80    / HCO3: 26    / Base Excess: 1.7   /  SaO2: 97    /  LA: 0.8      RADIOLOGY & ADDITIONAL TESTS:  CXR: < from: Xray Chest 1 View- PORTABLE-Routine (19 @ 04:33) >  Impression:    Bilateral opacities without significant change.    < end of copied text >    EKG: < from: 12 Lead ECG (06.30.19 @ 07:59) >  Ventricular Rate 62 BPM    Atrial Rate 62 BPM    P-R Interval 154 ms    QRS Duration 132 ms    Q-T Interval 460 ms    QTC Calculation(Bezet) 466 ms    P Axis 33 degrees    R Axis 28 degrees    T Axis -9 degrees    Diagnosis Line Sinus rhythm with Premature atrial complexes  Right bundle branch block  Cannot rule out Inferior infarct , age undetermined  Abnormal ECG    < end of copied text >    MEDICATIONS  (STANDING):  ALBUTerol    90 MICROgram(s) HFA Inhaler 2 Puff(s) Inhalation every 6 hours  amiodarone Infusion 1 mG/Min (33.333 mL/Hr) IV Continuous <Continuous>  aspirin enteric coated 325 milliGRAM(s) Oral daily  chlorhexidine 4% Liquid 1 Application(s) Topical <User Schedule>  docusate sodium 100 milliGRAM(s) Oral three times a day  famotidine    Tablet 20 milliGRAM(s) Oral daily  guaiFENesin  milliGRAM(s) Oral every 12 hours  insulin regular Infusion 1 Unit(s)/Hr (1 mL/Hr) IV Continuous <Continuous>  ipratropium 17 MICROgram(s) HFA Inhaler 2 Puff(s) Inhalation every 6 hours  levothyroxine 50 MICROGram(s) Oral daily  magnesium sulfate  IVPB 1 Gram(s) IV Intermittent every 12 hours  metoprolol tartrate 25 milliGRAM(s) Oral two times a day  sodium chloride 0.9%. 1000 milliLiter(s) (20 mL/Hr) IV Continuous <Continuous>    MEDICATIONS  (PRN):  ketorolac   Injectable 15 milliGRAM(s) IV Push every 6 hours PRN Moderate Pain (4 - 6)    HEPARIN:  [] YES [x] NO  Dose: XX UNITS/HR UNITS Q8H  LOVENOX:[] YES [x] NO  Dose: XX mg Q24H  COUMADIN: []  YES [x] NO  Dose: XX mg  Q24H  SCD's: YES b/l  GI Prophylaxis: Protonix [], Pepcid [x], None [], (Contra-indication:.....)    Post-Op Beta-Blockers: Yes [x], No[], If No, then contraindication:  Post-Op Aspirin: Yes [x],  No [], If No, then contraindication:  Post-Op Statin: Yes [], No[x], If No, then contraindication: elevated lfts  Allergies      87 year-old male s/p CABGx4 POD#2  -case discussed with Dr. Potter  - Continue DVT/GI prophylaxis  - Incentive Spirometry 10 times an hour  - Continue to advance physical activity as tolerated and continue PT/OT as directed  1. CAD: Continue ASA,  BB, hold statin- elevated lfts  2. HTN: on lopressor  3. A. Fib: change amio gtt to po amio, cont metoprolol  4. COPD/Hypoxia: cont nebs, mucinex, wean o2 as tolerated, lasix for fluid overload  5. DM/Glucose Control: insulin sliding scale, d/c gtt    Social Service Disposition:  pt to assess

## 2019-06-30 NOTE — PROGRESS NOTE ADULT - SUBJECTIVE AND OBJECTIVE BOX
88 yo M w CAD (MI 30 years ago), Prostate CA, HTN, HLD, Hypothyroidism who has been progressive shortness of breath, dyspnea on exertion and chest discomfort with minimal exertion relieved by SL nitro. Presented to hospital for LHC where he was found to have severe left main disease with normal LV fx. IABP placed and sent to CT ICU. Now s/p CABG x4.     6/29 had a fib controlled with amio.     Vital Signs Last 24 Hrs  T(C): 37.1 (30 Jun 2019 08:00), Max: 37.1 (29 Jun 2019 16:00)  T(F): 98.7 (30 Jun 2019 08:00), Max: 98.8 (29 Jun 2019 19:54)  HR: 69 (30 Jun 2019 11:00) (59 - 111)  BP: 168/78 (30 Jun 2019 11:00) (147/66 - 168/78)  BP(mean): 112 (30 Jun 2019 11:00) (95 - 112)  RR: 19 (30 Jun 2019 11:00) (16 - 29)  SpO2: 95% (30 Jun 2019 11:00) (93% - 100%)    alert, awake, verbal  follows commands  S1S2 reg rate  b/l air entry, rhonchi b/l  soft abdomen, non distended  + pedal and upper ext edema b/l  warm b/l, good rom    12 lead ekg, labs and imaging reviewed on am rounds    a/p:    change amio to  tid  d/c insulin gtt, f/u FS  cont with ASA, statin and Beta blocker  Trend plts, now 66. Hold SC heparin  PO synthroid  lasix 40 mg IV now. keep in negative balance  d/c a line and vega, keep cordis until tomorrow.

## 2019-07-01 LAB
ALBUMIN SERPL ELPH-MCNC: 3 G/DL — LOW (ref 3.5–5.2)
ALP SERPL-CCNC: 87 U/L — SIGNIFICANT CHANGE UP (ref 30–115)
ALT FLD-CCNC: 42 U/L — HIGH (ref 0–41)
ANION GAP SERPL CALC-SCNC: 9 MMOL/L — SIGNIFICANT CHANGE UP (ref 7–14)
AST SERPL-CCNC: 46 U/L — HIGH (ref 0–41)
BASOPHILS # BLD AUTO: 0.02 K/UL — SIGNIFICANT CHANGE UP (ref 0–0.2)
BASOPHILS NFR BLD AUTO: 0.3 % — SIGNIFICANT CHANGE UP (ref 0–1)
BILIRUB DIRECT SERPL-MCNC: 0.2 MG/DL — SIGNIFICANT CHANGE UP (ref 0–0.2)
BILIRUB INDIRECT FLD-MCNC: 0.4 MG/DL — SIGNIFICANT CHANGE UP (ref 0.2–1.2)
BILIRUB SERPL-MCNC: 0.6 MG/DL — SIGNIFICANT CHANGE UP (ref 0.2–1.2)
BUN SERPL-MCNC: 20 MG/DL — SIGNIFICANT CHANGE UP (ref 10–20)
CALCIUM SERPL-MCNC: 7.7 MG/DL — LOW (ref 8.5–10.1)
CHLORIDE SERPL-SCNC: 101 MMOL/L — SIGNIFICANT CHANGE UP (ref 98–110)
CO2 SERPL-SCNC: 27 MMOL/L — SIGNIFICANT CHANGE UP (ref 17–32)
CREAT SERPL-MCNC: 1 MG/DL — SIGNIFICANT CHANGE UP (ref 0.7–1.5)
EOSINOPHIL # BLD AUTO: 0.03 K/UL — SIGNIFICANT CHANGE UP (ref 0–0.7)
EOSINOPHIL NFR BLD AUTO: 0.4 % — SIGNIFICANT CHANGE UP (ref 0–8)
GLUCOSE BLDC GLUCOMTR-MCNC: 123 MG/DL — HIGH (ref 70–99)
GLUCOSE BLDC GLUCOMTR-MCNC: 128 MG/DL — HIGH (ref 70–99)
GLUCOSE BLDC GLUCOMTR-MCNC: 130 MG/DL — HIGH (ref 70–99)
GLUCOSE BLDC GLUCOMTR-MCNC: 133 MG/DL — HIGH (ref 70–99)
GLUCOSE BLDC GLUCOMTR-MCNC: 139 MG/DL — HIGH (ref 70–99)
GLUCOSE SERPL-MCNC: 135 MG/DL — HIGH (ref 70–99)
HCT VFR BLD CALC: 29.2 % — LOW (ref 42–52)
HGB BLD-MCNC: 9.4 G/DL — LOW (ref 14–18)
IMM GRANULOCYTES NFR BLD AUTO: 0.5 % — HIGH (ref 0.1–0.3)
LYMPHOCYTES # BLD AUTO: 0.93 K/UL — LOW (ref 1.2–3.4)
LYMPHOCYTES # BLD AUTO: 12.2 % — LOW (ref 20.5–51.1)
MAGNESIUM SERPL-MCNC: 2.5 MG/DL — HIGH (ref 1.8–2.4)
MCHC RBC-ENTMCNC: 29 PG — SIGNIFICANT CHANGE UP (ref 27–31)
MCHC RBC-ENTMCNC: 32.2 G/DL — SIGNIFICANT CHANGE UP (ref 32–37)
MCV RBC AUTO: 90.1 FL — SIGNIFICANT CHANGE UP (ref 80–94)
MONOCYTES # BLD AUTO: 0.91 K/UL — HIGH (ref 0.1–0.6)
MONOCYTES NFR BLD AUTO: 11.9 % — HIGH (ref 1.7–9.3)
NEUTROPHILS # BLD AUTO: 5.72 K/UL — SIGNIFICANT CHANGE UP (ref 1.4–6.5)
NEUTROPHILS NFR BLD AUTO: 74.7 % — SIGNIFICANT CHANGE UP (ref 42.2–75.2)
NRBC # BLD: 0 /100 WBCS — SIGNIFICANT CHANGE UP (ref 0–0)
PLATELET # BLD AUTO: 87 K/UL — LOW (ref 130–400)
POTASSIUM SERPL-MCNC: 4.1 MMOL/L — SIGNIFICANT CHANGE UP (ref 3.5–5)
POTASSIUM SERPL-SCNC: 4.1 MMOL/L — SIGNIFICANT CHANGE UP (ref 3.5–5)
PROT SERPL-MCNC: 5.2 G/DL — LOW (ref 6–8)
RBC # BLD: 3.24 M/UL — LOW (ref 4.7–6.1)
RBC # FLD: 14.7 % — HIGH (ref 11.5–14.5)
SODIUM SERPL-SCNC: 137 MMOL/L — SIGNIFICANT CHANGE UP (ref 135–146)
WBC # BLD: 7.65 K/UL — SIGNIFICANT CHANGE UP (ref 4.8–10.8)
WBC # FLD AUTO: 7.65 K/UL — SIGNIFICANT CHANGE UP (ref 4.8–10.8)

## 2019-07-01 PROCEDURE — 71045 X-RAY EXAM CHEST 1 VIEW: CPT | Mod: 26

## 2019-07-01 PROCEDURE — 93010 ELECTROCARDIOGRAM REPORT: CPT

## 2019-07-01 PROCEDURE — 99233 SBSQ HOSP IP/OBS HIGH 50: CPT

## 2019-07-01 RX ORDER — ONDANSETRON 8 MG/1
4 TABLET, FILM COATED ORAL ONCE
Refills: 0 | Status: COMPLETED | OUTPATIENT
Start: 2019-07-01 | End: 2019-07-01

## 2019-07-01 RX ORDER — LABETALOL HCL 100 MG
10 TABLET ORAL ONCE
Refills: 0 | Status: COMPLETED | OUTPATIENT
Start: 2019-07-01 | End: 2019-07-01

## 2019-07-01 RX ORDER — AMLODIPINE BESYLATE 2.5 MG/1
5 TABLET ORAL DAILY
Refills: 0 | Status: DISCONTINUED | OUTPATIENT
Start: 2019-07-01 | End: 2019-07-02

## 2019-07-01 RX ORDER — LABETALOL HCL 100 MG
10 TABLET ORAL ONCE
Refills: 0 | Status: DISCONTINUED | OUTPATIENT
Start: 2019-07-01 | End: 2019-07-02

## 2019-07-01 RX ORDER — FUROSEMIDE 40 MG
40 TABLET ORAL DAILY
Refills: 0 | Status: DISCONTINUED | OUTPATIENT
Start: 2019-07-01 | End: 2019-07-05

## 2019-07-01 RX ORDER — AMLODIPINE BESYLATE 2.5 MG/1
5 TABLET ORAL ONCE
Refills: 0 | Status: COMPLETED | OUTPATIENT
Start: 2019-07-01 | End: 2019-07-01

## 2019-07-01 RX ORDER — HEPARIN SODIUM 5000 [USP'U]/ML
5000 INJECTION INTRAVENOUS; SUBCUTANEOUS EVERY 12 HOURS
Refills: 0 | Status: DISCONTINUED | OUTPATIENT
Start: 2019-07-01 | End: 2019-07-02

## 2019-07-01 RX ORDER — POTASSIUM CHLORIDE 20 MEQ
20 PACKET (EA) ORAL EVERY 6 HOURS
Refills: 0 | Status: COMPLETED | OUTPATIENT
Start: 2019-07-01 | End: 2019-07-01

## 2019-07-01 RX ADMIN — Medication 25 MILLIGRAM(S): at 05:35

## 2019-07-01 RX ADMIN — Medication 10 MILLIGRAM(S): at 13:21

## 2019-07-01 RX ADMIN — Medication 100 MILLIEQUIVALENT(S): at 11:52

## 2019-07-01 RX ADMIN — Medication 100 GRAM(S): at 17:08

## 2019-07-01 RX ADMIN — Medication 600 MILLIGRAM(S): at 05:35

## 2019-07-01 RX ADMIN — Medication 600 MILLIGRAM(S): at 17:06

## 2019-07-01 RX ADMIN — Medication 100 MILLIGRAM(S): at 13:21

## 2019-07-01 RX ADMIN — AMIODARONE HYDROCHLORIDE 400 MILLIGRAM(S): 400 TABLET ORAL at 05:35

## 2019-07-01 RX ADMIN — Medication 81 MILLIGRAM(S): at 11:54

## 2019-07-01 RX ADMIN — ONDANSETRON 4 MILLIGRAM(S): 8 TABLET, FILM COATED ORAL at 21:20

## 2019-07-01 RX ADMIN — Medication 100 MILLIEQUIVALENT(S): at 10:08

## 2019-07-01 RX ADMIN — Medication 50 MICROGRAM(S): at 05:35

## 2019-07-01 RX ADMIN — HEPARIN SODIUM 5000 UNIT(S): 5000 INJECTION INTRAVENOUS; SUBCUTANEOUS at 17:06

## 2019-07-01 RX ADMIN — AMLODIPINE BESYLATE 5 MILLIGRAM(S): 2.5 TABLET ORAL at 11:54

## 2019-07-01 RX ADMIN — Medication 40 MILLIGRAM(S): at 11:53

## 2019-07-01 RX ADMIN — AMLODIPINE BESYLATE 5 MILLIGRAM(S): 2.5 TABLET ORAL at 22:28

## 2019-07-01 RX ADMIN — AMIODARONE HYDROCHLORIDE 400 MILLIGRAM(S): 400 TABLET ORAL at 21:24

## 2019-07-01 RX ADMIN — Medication 100 MILLIGRAM(S): at 05:35

## 2019-07-01 RX ADMIN — Medication 100 GRAM(S): at 05:37

## 2019-07-01 RX ADMIN — Medication 25 MILLIGRAM(S): at 16:31

## 2019-07-01 RX ADMIN — Medication 3 MILLILITER(S): at 21:31

## 2019-07-01 RX ADMIN — AMIODARONE HYDROCHLORIDE 400 MILLIGRAM(S): 400 TABLET ORAL at 13:22

## 2019-07-01 RX ADMIN — FAMOTIDINE 20 MILLIGRAM(S): 10 INJECTION INTRAVENOUS at 11:54

## 2019-07-01 NOTE — PROGRESS NOTE ADULT - SUBJECTIVE AND OBJECTIVE BOX
CTU Attending Progress Daily Note     01 Jul 2019 10:16  POD# 3  He has history of Prostate cancer  Old myocardial infarction  Hypothyroid  Essential hypertension    Interval event for past 24 hr:  LILIA SIERRA  87y had no event. Bp on the high side HR in th low 60s   Current Complains:  LILIA SIERRA has no new complains  HPI:  87 yrs old with Hx of CAD ( MI 30ys ago) presented for elective LHC.  pt was having chest pain on minimal exertion . today pt had an episode of chest pain with ECG revealing ST depressions.  currently pain free. on nitro drip. (27 Jun 2019 07:23)    OBJECTIVE:  ICU Vital Signs Last 24 Hrs  T(C): 36.1 (01 Jul 2019 08:00), Max: 37.5 (30 Jun 2019 16:00)  T(F): 96.9 (01 Jul 2019 08:00), Max: 99.5 (30 Jun 2019 16:00)  HR: 64 (01 Jul 2019 08:00) (62 - 79)  BP: 132/61 (01 Jul 2019 08:00) (129/62 - 176/76)  BP(mean): 107 (01 Jul 2019 06:00) (89 - 112)  ABP: 147/55 (30 Jun 2019 12:00) (47/19 - 147/55)  ABP(mean): 86 (30 Jun 2019 12:00) (29 - 86)  RR: 18 (01 Jul 2019 08:00) (6 - 35)  SpO2: 95% (01 Jul 2019 08:00) (95% - 99%)    I&O's Summary    30 Jun 2019 07:01  -  01 Jul 2019 07:00  --------------------------------------------------------  IN: 1133 mL / OUT: 1745 mL / NET: -612 mL    01 Jul 2019 07:01  -  01 Jul 2019 10:16  --------------------------------------------------------  IN: 120 mL / OUT: 0 mL / NET: 120 mL      I&O's Detail    30 Jun 2019 07:01  -  01 Jul 2019 07:00  --------------------------------------------------------  IN:    amiodarone Infusion: 100 mL    insulin regular Infusion: 3 mL    IV PiggyBack: 200 mL    Oral Fluid: 780 mL    sodium chloride 0.9%.: 50 mL  Total IN: 1133 mL    OUT:    Indwelling Catheter - Urethral: 1645 mL    Voided: 100 mL  Total OUT: 1745 mL    Total NET: -612 mL      01 Jul 2019 07:01  -  01 Jul 2019 10:16  --------------------------------------------------------  IN:    Oral Fluid: 120 mL  Total IN: 120 mL    OUT:  Total OUT: 0 mL    Total NET: 120 mL        Adult Advanced Hemodynamics Last 24 Hrs  CVP(mm Hg): --  CVP(cm H2O): --  CO: --  CI: --  PA: --  PA(mean): --  PCWP: --  SVR: --  SVRI: --  PVR: --  PVRI: --    CAPILLARY BLOOD GLUCOSE  123 (01 Jul 2019 06:00)  138 (30 Jun 2019 22:00)  136 (30 Jun 2019 15:35)  164 (30 Jun 2019 11:06)      POCT Blood Glucose.: 123 mg/dL (01 Jul 2019 05:44)  POCT Blood Glucose.: 138 mg/dL (30 Jun 2019 21:41)  POCT Blood Glucose.: 136 mg/dL (30 Jun 2019 15:35)  POCT Blood Glucose.: 164 mg/dL (30 Jun 2019 11:06)    LABS:  ABG - ( 30 Jun 2019 05:23 )  pH, Arterial: 7.42  pH, Blood: x     /  pCO2: 41    /  pO2: 80    / HCO3: 26    / Base Excess: 1.7   /  SaO2: 97                                      9.4    7.65  )-----------( 87       ( 01 Jul 2019 01:10 )             29.2     07-01    137  |  101  |  20  ----------------------------<  135<H>  4.1   |  27  |  1.0    Ca    7.7<L>      01 Jul 2019 01:10  Mg     2.5     07-01    TPro  5.2<L>  /  Alb  3.0<L>  /  TBili  0.6  /  DBili  0.2  /  AST  46<H>  /  ALT  42<H>  /  AlkPhos  87  07-01    PT/INR - ( 30 Jun 2019 00:30 )   PT: 14.10 sec;   INR: 1.23 ratio         PTT - ( 30 Jun 2019 00:30 )  PTT:28.7 sec      Home Medications:  aspirin 81 mg oral tablet: 1 tab(s) orally once a day (27 Jun 2019 07:30)  atorvastatin 20 mg oral tablet: 1 tab(s) orally once a day (27 Jun 2019 07:30)  isosorbide mononitrate 30 mg oral tablet, extended release: 1 tab(s) orally once a day (in the morning) (27 Jun 2019 07:30)  nitroglycerin 0.4 mg sublingual tablet: pt took in cath lab bathroom in cath lab (27 Jun 2019 07:30)  propranolol 60 mg oral capsule, extended release: 1 cap(s) orally once a day (27 Jun 2019 07:30)  Synthroid 50 mcg (0.05 mg) oral tablet: 1 tab(s) orally once a day (27 Jun 2019 07:30)    HOSPITAL MEDICATIONS:  MEDICATIONS  (STANDING):  ALBUTerol/ipratropium for Nebulization 3 milliLiter(s) Nebulizer every 6 hours  amiodarone    Tablet 400 milliGRAM(s) Oral every 8 hours  amLODIPine   Tablet 5 milliGRAM(s) Oral daily  aspirin enteric coated 81 milliGRAM(s) Oral daily  chlorhexidine 4% Liquid 1 Application(s) Topical <User Schedule>  dextrose 5%. 1000 milliLiter(s) (50 mL/Hr) IV Continuous <Continuous>  dextrose 50% Injectable 12.5 Gram(s) IV Push once  dextrose 50% Injectable 25 Gram(s) IV Push once  dextrose 50% Injectable 25 Gram(s) IV Push once  docusate sodium 100 milliGRAM(s) Oral three times a day  famotidine    Tablet 20 milliGRAM(s) Oral daily  furosemide   Injectable 40 milliGRAM(s) IV Push daily  guaiFENesin  milliGRAM(s) Oral every 12 hours  heparin  Injectable 5000 Unit(s) SubCutaneous every 12 hours  insulin lispro (HumaLOG) corrective regimen sliding scale   SubCutaneous three times a day before meals  levothyroxine 50 MICROGram(s) Oral daily  magnesium sulfate  IVPB 1 Gram(s) IV Intermittent every 12 hours  metoprolol tartrate 25 milliGRAM(s) Oral two times a day  potassium chloride  20 mEq/100 mL IVPB 20 milliEquivalent(s) IV Intermittent every 6 hours  sodium chloride 0.9%. 1000 milliLiter(s) (20 mL/Hr) IV Continuous <Continuous>    MEDICATIONS  (PRN):  dextrose 40% Gel 15 Gram(s) Oral once PRN Blood Glucose LESS THAN 70 milliGRAM(s)/deciliter  glucagon  Injectable 1 milliGRAM(s) IntraMuscular once PRN Glucose LESS THAN 70 milligrams/deciliter  ketorolac   Injectable 15 milliGRAM(s) IV Push every 6 hours PRN Moderate Pain (4 - 6)      REVIEW OF SYSTEMS:  CONSTITUTIONAL: [X] all negative; [ ] weakness, [ ] fevers, [ ] chills  EYES/ENT: [X] all negative; [ ] visual changes, [ ] vertigo, [ ] throat pain   NECK: [X] all negative; [ ] pain, [ ] stiffness  RESPIRATORY: [] all negative, [ ] cough, [ ] wheezing, [ ] hemoptysis, [ ] shortness of breath  CARDIOVASCULAR: [] all negative; [ ] chest pain, [ ] palpitations, [ ] orthopnea  GASTROINTESTINAL: [X] all negative; [ ]abdominal pain, [ ] nausea, [ ] vomiting, [ ] hematemesis, [ ] diarrhea, [ ] constipation, [ ] melena, [ ] hematochezia.  GENITOURINARY: [X] all negative; [ ] dysuria, [ ] frequency, [ ] hematuria  NEUROLOGICAL: [X] all negative; [ ] numbness, [ ] weakness  SKIN: [X] all negative; [ ] itching, [ ] burning, [ ] rashes, [ ] lesions   All other review of systems is negative unless indicated above.    [  ] Unable to assess ROS because     PHYSICAL EXAM:          CONSTITUTIONAL: Well-developed; well-nourished; in no acute distress.   	SKIN: warm, dry  	HEAD: Normocephalic; atraumatic.  	EYES: PERRL, EOM, no conj injection, sclera clear  	ENT: No nasal discharge; airway clear.  	NECK: Supple; non tender.  No midline ttp ctls  	CARD: S1, S2 normal; no murmurs, gallops, or rubs. Regular rate and rhythm. 2+ RPs and DPs bilat, no carotid bruits, no pedal   edema, no calf pain b/l  	RESP: CTA  bilat good air movement No wheezes, rales or rhonchi.  	ABD: Soft, not tender, not distended, no CVA ttp no rebound or guarding, bowel sounds present  	EXT: Normal ROM.  No clubbing, cyanosis or edema.   	  	NEURO: Alert, awake, motor 5/5 R, 5/5 L        RADIOLOGY:  xray  < from: Xray Chest 1 View- PORTABLE-Routine (07.01.19 @ 05:35) >  Interpretation:    Support devices: Right IJ cordis    Cardiac/mediastinum/hilum: Unchanged    Lung parenchyma/Pleura: Resolving bilateral lower lobe opacities and   improved lung aeration. No evidence of pneumothorax.    Skeleton/soft tissues: Unchanged          < end of copied text >

## 2019-07-01 NOTE — PROGRESS NOTE ADULT - SUBJECTIVE AND OBJECTIVE BOX
OPERATIVE PROCEDURE(s):                POD #                       87yMale  SURGEON(s): LEE Edmondson  SUBJECTIVE ASSESSMENT:   Vital Signs Last 24 Hrs  T(F): 97.9 (01 Jul 2019 04:00), Max: 99.5 (30 Jun 2019 16:00)  HR: 71 (01 Jul 2019 06:00) (59 - 79)  BP: 167/74 (01 Jul 2019 06:00) (129/62 - 176/76)  BP(mean): 107 (01 Jul 2019 06:00) (89 - 112)  ABP: 147/55 (30 Jun 2019 12:00) (47/19 - 147/55)  ABP(mean): 86 (30 Jun 2019 12:00)  RR: 21 (01 Jul 2019 06:00) (6 - 35)  SpO2: 95% (01 Jul 2019 06:00) (95% - 99%)  CVP(mm Hg): --  CVP(cm H2O): --  CO: --  CI: --  PA: --  SVR: --    I&O's Detail    30 Jun 2019 07:01  -  01 Jul 2019 07:00  --------------------------------------------------------  IN:    amiodarone Infusion: 100 mL    insulin regular Infusion: 3 mL    IV PiggyBack: 200 mL    Oral Fluid: 780 mL    sodium chloride 0.9%.: 50 mL  Total IN: 1133 mL    OUT:    Indwelling Catheter - Urethral: 1645 mL    Voided: 100 mL  Total OUT: 1745 mL        Net:   I&O's Detail    29 Jun 2019 07:01  -  30 Jun 2019 07:00  --------------------------------------------------------  Total NET: 1189.4 mL      30 Jun 2019 07:01  -  01 Jul 2019 07:00  --------------------------------------------------------  Total NET: -612 mL        CAPILLARY BLOOD GLUCOSE  123 (01 Jul 2019 06:00)  138 (30 Jun 2019 22:00)  136 (30 Jun 2019 15:35)  164 (30 Jun 2019 11:06)      POCT Blood Glucose.: 123 mg/dL (01 Jul 2019 05:44)  POCT Blood Glucose.: 138 mg/dL (30 Jun 2019 21:41)  POCT Blood Glucose.: 136 mg/dL (30 Jun 2019 15:35)  POCT Blood Glucose.: 164 mg/dL (30 Jun 2019 11:06)    Physical Exam:  General: NAD; A&Ox3/Patient is intubated and sedated  Cardiac: S1/S2, RRR, no murmur, no rubs  Lungs: unlabored respirations, CTA b/l, no wheeze, no rales, no crackles  Abdomen: Soft/NT/ND; positive bowel sounds x 4  Sternum: Intact, no click, incision healing well with no drainage  Incisions: Incisions clean/dry/intact  Extremities: No edema b/l lower extremities; good capillary refill; no cyanosis; palpable 1+ pedal pulses b/l    Central Venous Catheter: Yes[]  No[] , If Yes indication:           Day #  Silva Catheter: Yes  [] , No  [] , If yes indication:                      Day #  NGT: Yes [] No [] ,    If Yes Placement:                                     Day #  EPICARDIAL WIRES:  [] YES [] NO                                              Day #  BOWEL MOVEMENT:  [] YES [] NO, If No, Timing since last BM:      Day #  CHEST TUBE(Left/Right):  [] YES [] NO, If yes -  AIR LEAKS:  [] YES [] NO        LABS:                        9.4<L>  7.65  )-----------( 87<L>    ( 01 Jul 2019 01:10 )             29.2<L>                        9.1<L>  6.32  )-----------( 66<L>    ( 30 Jun 2019 00:30 )             28.3<L>    07-01    137  |  101  |  20  ----------------------------<  135<H>  4.1   |  27  |  1.0  06-30    136  |  102  |  21<H>  ----------------------------<  126<H>  3.8   |  23  |  1.1    Ca    7.7<L>      01 Jul 2019 01:10  Mg     2.5     07-01    TPro  5.2<L> [6.0 - 8.0]  /  Alb  3.0<L> [3.5 - 5.2]  /  TBili  0.6 [0.2 - 1.2]  /  DBili  0.2 [0.0 - 0.2]  /  AST  46<H> [0 - 41]  /  ALT  42<H> [0 - 41]  /  AlkPhos  87 [30 - 115]  07-01    PT/INR - ( 30 Jun 2019 00:30 )   PT: ;   INR: 1.23 ratio         PTT - ( 30 Jun 2019 00:30 )  PTT:28.7 sec    ABG - ( 30 Jun 2019 05:23 )  pH: 7.42  /  pCO2: 41    /  pO2: 80    / HCO3: 26    / Base Excess: 1.7   /  SaO2: 97    /  LA: 0.8              RADIOLOGY & ADDITIONAL TESTS:  CXR:  EKG:  MEDICATIONS  (STANDING):  ALBUTerol/ipratropium for Nebulization 3 milliLiter(s) Nebulizer every 6 hours  amiodarone    Tablet 400 milliGRAM(s) Oral every 8 hours  aspirin enteric coated 81 milliGRAM(s) Oral daily  chlorhexidine 4% Liquid 1 Application(s) Topical <User Schedule>  dextrose 5%. 1000 milliLiter(s) (50 mL/Hr) IV Continuous <Continuous>  dextrose 50% Injectable 12.5 Gram(s) IV Push once  dextrose 50% Injectable 25 Gram(s) IV Push once  dextrose 50% Injectable 25 Gram(s) IV Push once  docusate sodium 100 milliGRAM(s) Oral three times a day  famotidine    Tablet 20 milliGRAM(s) Oral daily  guaiFENesin  milliGRAM(s) Oral every 12 hours  insulin lispro (HumaLOG) corrective regimen sliding scale   SubCutaneous three times a day before meals  levothyroxine 50 MICROGram(s) Oral daily  magnesium sulfate  IVPB 1 Gram(s) IV Intermittent every 12 hours  metoprolol tartrate 25 milliGRAM(s) Oral two times a day  sodium chloride 0.9%. 1000 milliLiter(s) (20 mL/Hr) IV Continuous <Continuous>    MEDICATIONS  (PRN):  dextrose 40% Gel 15 Gram(s) Oral once PRN Blood Glucose LESS THAN 70 milliGRAM(s)/deciliter  glucagon  Injectable 1 milliGRAM(s) IntraMuscular once PRN Glucose LESS THAN 70 milligrams/deciliter  ketorolac   Injectable 15 milliGRAM(s) IV Push every 6 hours PRN Moderate Pain (4 - 6)    HEPARIN:  [] YES [] NO  Dose: XX UNITS/HR UNITS Q8H  LOVENOX:[] YES [] NO  Dose: XX mg Q24H  COUMADIN: []  YES [] NO  Dose: XX mg  Q24H  SCD's: YES b/l  GI Prophylaxis: Protonix [], Pepcid [], None [], (Contra-indication:.....)    Post-Op Beta-Blockers: Yes [], No[], If No, then contraindication:  Post-Op Aspirin: Yes [],  No [], If No, then contraindication:  Post-Op Statin: Yes [], No[], If No, then contraindication:  Allergies    No Known Allergies    Intolerances      Ambulation/Activity Status:    Assessment/Plan:  87y Male status-post .....  - Case and plan discussed with CTU Intensivist and CT Surgeon - Dr. Anderson/Casandra/Debo   - Continue CTU supportive care    - Continue DVT/GI prophylaxis  - Incentive Spirometry 10 times an hour  - Continue to advance physical activity as tolerated and continue PT/OT as directed  1. CAD: Continue ASA, statin, BB  2. HTN:   3. A. Fib:   4. COPD/Hypoxia:   5. DM/Glucose Control:     Social Service Disposition: OPERATIVE PROCEDURE(s):    CABGx4            POD # 3                      87yMale  SURGEON(s): LEE Edmondson  SUBJECTIVE ASSESSMENT: pt seen and examined.   Vital Signs Last 24 Hrs  T(F): 97.9 (01 Jul 2019 04:00), Max: 99.5 (30 Jun 2019 16:00)  HR: 71 (01 Jul 2019 06:00) (59 - 79)  BP: 167/74 (01 Jul 2019 06:00) (129/62 - 176/76)  BP(mean): 107 (01 Jul 2019 06:00) (89 - 112)  ABP: 147/55 (30 Jun 2019 12:00) (47/19 - 147/55)  ABP(mean): 86 (30 Jun 2019 12:00)  RR: 21 (01 Jul 2019 06:00) (6 - 35)  SpO2: 95% (01 Jul 2019 06:00) (95% - 99%)      I&O's Detail    30 Jun 2019 07:01  -  01 Jul 2019 07:00  --------------------------------------------------------  IN:    amiodarone Infusion: 100 mL    insulin regular Infusion: 3 mL    IV PiggyBack: 200 mL    Oral Fluid: 780 mL    sodium chloride 0.9%.: 50 mL  Total IN: 1133 mL    OUT:    Indwelling Catheter - Urethral: 1645 mL    Voided: 100 mL  Total OUT: 1745 mL        Net:   I&O's Detail    29 Jun 2019 07:01  -  30 Jun 2019 07:00  --------------------------------------------------------  Total NET: 1189.4 mL      30 Jun 2019 07:01  -  01 Jul 2019 07:00  --------------------------------------------------------  Total NET: -612 mL        CAPILLARY BLOOD GLUCOSE  123 (01 Jul 2019 06:00)  138 (30 Jun 2019 22:00)  136 (30 Jun 2019 15:35)  164 (30 Jun 2019 11:06)      POCT Blood Glucose.: 123 mg/dL (01 Jul 2019 05:44)  POCT Blood Glucose.: 138 mg/dL (30 Jun 2019 21:41)  POCT Blood Glucose.: 136 mg/dL (30 Jun 2019 15:35)  POCT Blood Glucose.: 164 mg/dL (30 Jun 2019 11:06)    Physical Exam:  General: NAD; A&Ox3/Patient is intubated and sedated  Cardiac: S1/S2, RRR, no murmur, no rubs  Lungs: unlabored respirations, CTA b/l, no wheeze, no rales, no crackles  Abdomen: Soft/NT/ND; positive bowel sounds x 4  Sternum: Intact, no click, incision healing well with no drainage  Incisions: Incisions clean/dry/intact  Extremities: No edema b/l lower extremities; good capillary refill; no cyanosis; palpable 1+ pedal pulses b/l    Central Venous Catheter: Yes[]  No[] , If Yes indication:           Day #  Silva Catheter: Yes  [] , No  [] , If yes indication:                      Day #  NGT: Yes [] No [] ,    If Yes Placement:                                     Day #  EPICARDIAL WIRES:  [] YES [] NO                                              Day #  BOWEL MOVEMENT:  [] YES [] NO, If No, Timing since last BM:      Day #  CHEST TUBE(Left/Right):  [] YES [] NO, If yes -  AIR LEAKS:  [] YES [] NO        LABS:                        9.4<L>  7.65  )-----------( 87<L>    ( 01 Jul 2019 01:10 )             29.2<L>                        9.1<L>  6.32  )-----------( 66<L>    ( 30 Jun 2019 00:30 )             28.3<L>    07-01    137  |  101  |  20  ----------------------------<  135<H>  4.1   |  27  |  1.0  06-30    136  |  102  |  21<H>  ----------------------------<  126<H>  3.8   |  23  |  1.1    Ca    7.7<L>      01 Jul 2019 01:10  Mg     2.5     07-01    TPro  5.2<L> [6.0 - 8.0]  /  Alb  3.0<L> [3.5 - 5.2]  /  TBili  0.6 [0.2 - 1.2]  /  DBili  0.2 [0.0 - 0.2]  /  AST  46<H> [0 - 41]  /  ALT  42<H> [0 - 41]  /  AlkPhos  87 [30 - 115]  07-01    PT/INR - ( 30 Jun 2019 00:30 )   PT: ;   INR: 1.23 ratio         PTT - ( 30 Jun 2019 00:30 )  PTT:28.7 sec    ABG - ( 30 Jun 2019 05:23 )  pH: 7.42  /  pCO2: 41    /  pO2: 80    / HCO3: 26    / Base Excess: 1.7   /  SaO2: 97    /  LA: 0.8              RADIOLOGY & ADDITIONAL TESTS:  CXR:  EKG:  MEDICATIONS  (STANDING):  ALBUTerol/ipratropium for Nebulization 3 milliLiter(s) Nebulizer every 6 hours  amiodarone    Tablet 400 milliGRAM(s) Oral every 8 hours  aspirin enteric coated 81 milliGRAM(s) Oral daily  chlorhexidine 4% Liquid 1 Application(s) Topical <User Schedule>  dextrose 5%. 1000 milliLiter(s) (50 mL/Hr) IV Continuous <Continuous>  dextrose 50% Injectable 12.5 Gram(s) IV Push once  dextrose 50% Injectable 25 Gram(s) IV Push once  dextrose 50% Injectable 25 Gram(s) IV Push once  docusate sodium 100 milliGRAM(s) Oral three times a day  famotidine    Tablet 20 milliGRAM(s) Oral daily  guaiFENesin  milliGRAM(s) Oral every 12 hours  insulin lispro (HumaLOG) corrective regimen sliding scale   SubCutaneous three times a day before meals  levothyroxine 50 MICROGram(s) Oral daily  magnesium sulfate  IVPB 1 Gram(s) IV Intermittent every 12 hours  metoprolol tartrate 25 milliGRAM(s) Oral two times a day  sodium chloride 0.9%. 1000 milliLiter(s) (20 mL/Hr) IV Continuous <Continuous>    MEDICATIONS  (PRN):  dextrose 40% Gel 15 Gram(s) Oral once PRN Blood Glucose LESS THAN 70 milliGRAM(s)/deciliter  glucagon  Injectable 1 milliGRAM(s) IntraMuscular once PRN Glucose LESS THAN 70 milligrams/deciliter  ketorolac   Injectable 15 milliGRAM(s) IV Push every 6 hours PRN Moderate Pain (4 - 6)    HEPARIN:  [] YES [] NO  Dose: XX UNITS/HR UNITS Q8H  LOVENOX:[] YES [] NO  Dose: XX mg Q24H  COUMADIN: []  YES [] NO  Dose: XX mg  Q24H  SCD's: YES b/l  GI Prophylaxis: Protonix [], Pepcid [], None [], (Contra-indication:.....)    Post-Op Beta-Blockers: Yes [], No[], If No, then contraindication:  Post-Op Aspirin: Yes [],  No [], If No, then contraindication:  Post-Op Statin: Yes [], No[], If No, then contraindication:  Allergies    No Known Allergies    Intolerances      Ambulation/Activity Status:    Assessment/Plan:  87y Male status-post .....  - Case and plan discussed with CTU Intensivist and CT Surgeon - Dr. Anderson/Casandra/Debo   - Continue CTU supportive care    - Continue DVT/GI prophylaxis  - Incentive Spirometry 10 times an hour  - Continue to advance physical activity as tolerated and continue PT/OT as directed  1. CAD: Continue ASA, statin, BB  2. HTN:   3. A. Fib:   4. COPD/Hypoxia:   5. DM/Glucose Control:     Social Service Disposition: OPERATIVE PROCEDURE(s):    CABGx4            POD # 3                      87yMale  SURGEON(s): LEE Edmondson  SUBJECTIVE ASSESSMENT: pt seen and examined. no acute complaints   Vital Signs Last 24 Hrs  T(F): 97.9 (01 Jul 2019 04:00), Max: 99.5 (30 Jun 2019 16:00)  HR: 71 (01 Jul 2019 06:00) (59 - 79)  BP: 167/74 (01 Jul 2019 06:00) (129/62 - 176/76)  BP(mean): 107 (01 Jul 2019 06:00) (89 - 112)  ABP: 147/55 (30 Jun 2019 12:00) (47/19 - 147/55)  ABP(mean): 86 (30 Jun 2019 12:00)  RR: 21 (01 Jul 2019 06:00) (6 - 35)  SpO2: 95% (01 Jul 2019 06:00) (95% - 99%)      I&O's Detail    30 Jun 2019 07:01  -  01 Jul 2019 07:00  --------------------------------------------------------  IN:    amiodarone Infusion: 100 mL    insulin regular Infusion: 3 mL    IV PiggyBack: 200 mL    Oral Fluid: 780 mL    sodium chloride 0.9%.: 50 mL  Total IN: 1133 mL    OUT:    Indwelling Catheter - Urethral: 1645 mL    Voided: 100 mL  Total OUT: 1745 mL        Net:   I&O's Detail    29 Jun 2019 07:01  -  30 Jun 2019 07:00  --------------------------------------------------------  Total NET: 1189.4 mL      30 Jun 2019 07:01  -  01 Jul 2019 07:00  --------------------------------------------------------  Total NET: -612 mL        CAPILLARY BLOOD GLUCOSE  123 (01 Jul 2019 06:00)  138 (30 Jun 2019 22:00)  136 (30 Jun 2019 15:35)  164 (30 Jun 2019 11:06)      POCT Blood Glucose.: 123 mg/dL (01 Jul 2019 05:44)  POCT Blood Glucose.: 138 mg/dL (30 Jun 2019 21:41)  POCT Blood Glucose.: 136 mg/dL (30 Jun 2019 15:35)  POCT Blood Glucose.: 164 mg/dL (30 Jun 2019 11:06)    Physical Exam:  General: NAD; A&Ox3  Cardiac: S1/S2, RRR, no murmur, no rubs  Lungs: decreased bs at bases bilaterally  Abdomen: Soft/NT/ND; positive bowel sounds x 4  Sternum: Intact, no click, incision healing well with no drainage  Incisions: Incisions clean/dry/intact  Extremities:  edema b/l lower extremities; good capillary refill; no cyanosis; palpable 1+ pedal pulses b/l    Central Venous Catheter: Yes[x]  No[] , If Yes indication:    access       Day #3  Silva Catheter: Yes  [] , No  [x] , If yes indication:                      Day #  NGT: Yes [] No [x] ,    If Yes Placement:                                     Day #  EPICARDIAL WIRES:  [x] YES [] NO                                              Day #3  BOWEL MOVEMENT:  [] YES [x] NO, If No, Timing since last BM:      Day #  CHEST TUBE(Left/Right):  [] YES [x] NO, If yes -  AIR LEAKS:  [] YES [] NO        LABS:                        9.4<L>  7.65  )-----------( 87<L>    ( 01 Jul 2019 01:10 )             29.2<L>                        9.1<L>  6.32  )-----------( 66<L>    ( 30 Jun 2019 00:30 )             28.3<L>    07-01    137  |  101  |  20  ----------------------------<  135<H>  4.1   |  27  |  1.0  06-30    136  |  102  |  21<H>  ----------------------------<  126<H>  3.8   |  23  |  1.1    Ca    7.7<L>      01 Jul 2019 01:10  Mg     2.5     07-01    TPro  5.2<L> [6.0 - 8.0]  /  Alb  3.0<L> [3.5 - 5.2]  /  TBili  0.6 [0.2 - 1.2]  /  DBili  0.2 [0.0 - 0.2]  /  AST  46<H> [0 - 41]  /  ALT  42<H> [0 - 41]  /  AlkPhos  87 [30 - 115]  07-01    PT/INR - ( 30 Jun 2019 00:30 )   PT: ;   INR: 1.23 ratio         PTT - ( 30 Jun 2019 00:30 )  PTT:28.7 sec    ABG - ( 30 Jun 2019 05:23 )  pH: 7.42  /  pCO2: 41    /  pO2: 80    / HCO3: 26    / Base Excess: 1.7   /  SaO2: 97    /  LA: 0.8              RADIOLOGY & ADDITIONAL TESTS:  CXR: < from: Xray Chest 1 View- PORTABLE-Routine (06.30.19 @ 04:33) >    Impression:    Bilateral opacities without significant change.    < end of copied text >    EKG: < from: 12 Lead ECG (06.30.19 @ 07:59) >  Ventricular Rate 62 BPM    Atrial Rate 62 BPM    P-R Interval 154 ms    QRS Duration 132 ms    Q-T Interval 460 ms    QTC Calculation(Bezet) 466 ms    P Axis 33 degrees    R Axis 28 degrees    T Axis -9 degrees    Diagnosis Line Sinus rhythm with Premature atrial complexes  Right bundle branch block  Cannot rule out Inferior infarct , age undetermined  Abnormal ECG    < end of copied text >    MEDICATIONS  (STANDING):  ALBUTerol/ipratropium for Nebulization 3 milliLiter(s) Nebulizer every 6 hours  amiodarone    Tablet 400 milliGRAM(s) Oral every 8 hours  aspirin enteric coated 81 milliGRAM(s) Oral daily  chlorhexidine 4% Liquid 1 Application(s) Topical <User Schedule>  dextrose 5%. 1000 milliLiter(s) (50 mL/Hr) IV Continuous <Continuous>  dextrose 50% Injectable 12.5 Gram(s) IV Push once  dextrose 50% Injectable 25 Gram(s) IV Push once  dextrose 50% Injectable 25 Gram(s) IV Push once  docusate sodium 100 milliGRAM(s) Oral three times a day  famotidine    Tablet 20 milliGRAM(s) Oral daily  guaiFENesin  milliGRAM(s) Oral every 12 hours  insulin lispro (HumaLOG) corrective regimen sliding scale   SubCutaneous three times a day before meals  levothyroxine 50 MICROGram(s) Oral daily  magnesium sulfate  IVPB 1 Gram(s) IV Intermittent every 12 hours  metoprolol tartrate 25 milliGRAM(s) Oral two times a day  sodium chloride 0.9%. 1000 milliLiter(s) (20 mL/Hr) IV Continuous <Continuous>    MEDICATIONS  (PRN):  dextrose 40% Gel 15 Gram(s) Oral once PRN Blood Glucose LESS THAN 70 milliGRAM(s)/deciliter  glucagon  Injectable 1 milliGRAM(s) IntraMuscular once PRN Glucose LESS THAN 70 milligrams/deciliter  ketorolac   Injectable 15 milliGRAM(s) IV Push every 6 hours PRN Moderate Pain (4 - 6)    HEPARIN:  [] YES [x] NO  Dose: XX UNITS/HR UNITS Q8H  LOVENOX:[] YES [x] NO  Dose: XX mg Q24H  COUMADIN: []  YES [x] NO  Dose: XX mg  Q24H  SCD's: YES b/l  GI Prophylaxis: Protonix [], Pepcid [x], None [], (Contra-indication:.....)    Post-Op Beta-Blockers: Yes [x], No[], If No, then contraindication:  Post-Op Aspirin: Yes [x],  No [], If No, then contraindication:  Post-Op Statin: Yes [x], No[], If No, then contraindication:  Allergies    No Known Allergies    Intolerances      Ambulation/Activity Status: ambulate     Assessment/Plan:  87 year-old male s/p CABGx4 POD#3  -case discussed with Dr. Edmondson  - Continue DVT/GI prophylaxis   - Incentive Spirometry 10 times an hour  - Continue to advance physical activity as tolerated and continue PT/OT as directed  1. CAD: Continue ASA,  BB, restart statin  2. HTN: on lopressor, start norvasc   3. A. Fib: cont po amio, cont metoprolol  4. COPD/Hypoxia: cont nebs, mucinex, wean o2 as tolerated, lasix for fluid overload  5. DM/Glucose Control: insulin sliding scale    Social Service Disposition:  pt to assess OPERATIVE PROCEDURE(s):    CABGx4            POD # 3                      87yMale  SURGEON(s): LEE Edmondson  SUBJECTIVE ASSESSMENT: pt seen and examined. no acute complaints   Vital Signs Last 24 Hrs  T(F): 97.9 (01 Jul 2019 04:00), Max: 99.5 (30 Jun 2019 16:00)  HR: 71 (01 Jul 2019 06:00) (59 - 79)  BP: 167/74 (01 Jul 2019 06:00) (129/62 - 176/76)  BP(mean): 107 (01 Jul 2019 06:00) (89 - 112)  ABP: 147/55 (30 Jun 2019 12:00) (47/19 - 147/55)  ABP(mean): 86 (30 Jun 2019 12:00)  RR: 21 (01 Jul 2019 06:00) (6 - 35)  SpO2: 95% (01 Jul 2019 06:00) (95% - 99%)    I&O's Detail    30 Jun 2019 07:01  -  01 Jul 2019 07:00  --------------------------------------------------------  IN:    amiodarone Infusion: 100 mL    insulin regular Infusion: 3 mL    IV PiggyBack: 200 mL    Oral Fluid: 780 mL    sodium chloride 0.9%.: 50 mL  Total IN: 1133 mL    OUT:    Indwelling Catheter - Urethral: 1645 mL    Voided: 100 mL  Total OUT: 1745 mL    Net:   I&O's Detail    29 Jun 2019 07:01  -  30 Jun 2019 07:00  --------------------------------------------------------  Total NET: 1189.4 mL      30 Jun 2019 07:01  -  01 Jul 2019 07:00  --------------------------------------------------------  Total NET: -612 mL    CAPILLARY BLOOD GLUCOSE  123 (01 Jul 2019 06:00)  138 (30 Jun 2019 22:00)  136 (30 Jun 2019 15:35)  164 (30 Jun 2019 11:06)    POCT Blood Glucose.: 123 mg/dL (01 Jul 2019 05:44)  POCT Blood Glucose.: 138 mg/dL (30 Jun 2019 21:41)  POCT Blood Glucose.: 136 mg/dL (30 Jun 2019 15:35)  POCT Blood Glucose.: 164 mg/dL (30 Jun 2019 11:06)    Physical Exam:  General: NAD; A&Ox3  Cardiac: S1/S2, RRR, no murmur, no rubs  Lungs: decreased bs at bases bilaterally  Abdomen: Soft/NT/ND; positive bowel sounds x 4  Sternum: Intact, no click, incision healing well with no drainage  Incisions: Incisions clean/dry/intact  Extremities:  edema b/l lower extremities; good capillary refill; no cyanosis; palpable 1+ pedal pulses b/l    Central Venous Catheter: Yes[x]  No[] , If Yes indication:    access       Day #3  Silva Catheter: Yes  [] , No  [x] , If yes indication:                      Day #  NGT: Yes [] No [x] ,    If Yes Placement:                                     Day #  EPICARDIAL WIRES:  [x] YES [] NO                                              Day #3  BOWEL MOVEMENT:  [] YES [x] NO, If No, Timing since last BM:      Day #  CHEST TUBE(Left/Right):  [] YES [x] NO, If yes -  AIR LEAKS:  [] YES [] NO        LABS:                        9.4<L>  7.65  )-----------( 87<L>    ( 01 Jul 2019 01:10 )             29.2<L>                        9.1<L>  6.32  )-----------( 66<L>    ( 30 Jun 2019 00:30 )             28.3<L>    07-01    137  |  101  |  20  ----------------------------<  135<H>  4.1   |  27  |  1.0  06-30    136  |  102  |  21<H>  ----------------------------<  126<H>  3.8   |  23  |  1.1    Ca    7.7<L>      01 Jul 2019 01:10  Mg     2.5     07-01    TPro  5.2<L> [6.0 - 8.0]  /  Alb  3.0<L> [3.5 - 5.2]  /  TBili  0.6 [0.2 - 1.2]  /  DBili  0.2 [0.0 - 0.2]  /  AST  46<H> [0 - 41]  /  ALT  42<H> [0 - 41]  /  AlkPhos  87 [30 - 115]  07-01    PT/INR - ( 30 Jun 2019 00:30 )   PT: ;   INR: 1.23 ratio      PTT - ( 30 Jun 2019 00:30 )  PTT:28.7 sec    ABG - ( 30 Jun 2019 05:23 )  pH: 7.42  /  pCO2: 41    /  pO2: 80    / HCO3: 26    / Base Excess: 1.7   /  SaO2: 97    /  LA: 0.8      RADIOLOGY & ADDITIONAL TESTS:  CXR: < from: Xray Chest 1 View- PORTABLE-Routine (06.30.19 @ 04:33) >    Impression:    Bilateral opacities without significant change.    < end of copied text >    EKG: < from: 12 Lead ECG (06.30.19 @ 07:59) >  Ventricular Rate 62 BPM    Atrial Rate 62 BPM    P-R Interval 154 ms    QRS Duration 132 ms    Q-T Interval 460 ms    QTC Calculation(Bezet) 466 ms    P Axis 33 degrees    R Axis 28 degrees    T Axis -9 degrees    Diagnosis Line Sinus rhythm with Premature atrial complexes  Right bundle branch block  Cannot rule out Inferior infarct , age undetermined  Abnormal ECG    < end of copied text >    MEDICATIONS  (STANDING):  ALBUTerol/ipratropium for Nebulization 3 milliLiter(s) Nebulizer every 6 hours  amiodarone    Tablet 400 milliGRAM(s) Oral every 8 hours  aspirin enteric coated 81 milliGRAM(s) Oral daily  docusate sodium 100 milliGRAM(s) Oral three times a day  famotidine    Tablet 20 milliGRAM(s) Oral daily  guaiFENesin  milliGRAM(s) Oral every 12 hours  insulin lispro (HumaLOG) corrective regimen sliding scale   SubCutaneous three times a day before meals  levothyroxine 50 MICROGram(s) Oral daily  magnesium sulfate  IVPB 1 Gram(s) IV Intermittent every 12 hours  metoprolol tartrate 25 milliGRAM(s) Oral two times a day    MEDICATIONS  (PRN):  dextrose 40% Gel 15 Gram(s) Oral once PRN Blood Glucose LESS THAN 70 milliGRAM(s)/deciliter  glucagon  Injectable 1 milliGRAM(s) IntraMuscular once PRN Glucose LESS THAN 70 milligrams/deciliter  ketorolac   Injectable 15 milliGRAM(s) IV Push every 6 hours PRN Moderate Pain (4 - 6)    HEPARIN:  [] YES [x] NO  Dose: XX UNITS/HR UNITS Q8H  LOVENOX:[] YES [x] NO  Dose: XX mg Q24H  COUMADIN: []  YES [x] NO  Dose: XX mg  Q24H  SCD's: YES b/l  GI Prophylaxis: Protonix [], Pepcid [x], None [], (Contra-indication:.....)    Post-Op Beta-Blockers: Yes [x], No[], If No, then contraindication:  Post-Op Aspirin: Yes [x],  No [], If No, then contraindication:  Post-Op Statin: Yes [x], No[], If No, then contraindication:  Allergies    No Known Allergies  Intolerances    Ambulation/Activity Status: ambulate     Assessment/Plan:  87 year-old male s/p CABGx4 POD#3  -case discussed with Dr. Edmondson  - Continue DVT/GI prophylaxis   - Incentive Spirometry 10 times an hour  - Continue to advance physical activity as tolerated and continue PT/OT as directed  1. CAD: Continue ASA,  BB, restart statin  2. HTN: on lopressor, start norvasc   3. A. Fib: cont po amio, cont metoprolol  4. COPD/Hypoxia: cont nebs, mucinex, wean o2 as tolerated, lasix for fluid overload  5. DM/Glucose Control: insulin sliding scale    Social Service Disposition:  pt to assess

## 2019-07-01 NOTE — PHARMACOTHERAPY INTERVENTION NOTE - COMMENTS
Prescriber was contacted to verify dose and duration of KCL 20meq  iv Q6h x 2doses plus 20meq IV at 23:00.  K=4.1... As per provider GARY Koo - intensivist recommended  3 doses of KCL due to patient is on Lasix.  Will re-check K later

## 2019-07-02 LAB
ANION GAP SERPL CALC-SCNC: 8 MMOL/L — SIGNIFICANT CHANGE UP (ref 7–14)
BASOPHILS # BLD AUTO: 0.03 K/UL — SIGNIFICANT CHANGE UP (ref 0–0.2)
BASOPHILS NFR BLD AUTO: 0.4 % — SIGNIFICANT CHANGE UP (ref 0–1)
BUN SERPL-MCNC: 21 MG/DL — HIGH (ref 10–20)
CALCIUM SERPL-MCNC: 7.8 MG/DL — LOW (ref 8.5–10.1)
CHLORIDE SERPL-SCNC: 100 MMOL/L — SIGNIFICANT CHANGE UP (ref 98–110)
CO2 SERPL-SCNC: 28 MMOL/L — SIGNIFICANT CHANGE UP (ref 17–32)
CREAT SERPL-MCNC: 0.9 MG/DL — SIGNIFICANT CHANGE UP (ref 0.7–1.5)
EOSINOPHIL # BLD AUTO: 0.09 K/UL — SIGNIFICANT CHANGE UP (ref 0–0.7)
EOSINOPHIL NFR BLD AUTO: 1.2 % — SIGNIFICANT CHANGE UP (ref 0–8)
GLUCOSE BLDC GLUCOMTR-MCNC: 140 MG/DL — HIGH (ref 70–99)
GLUCOSE BLDC GLUCOMTR-MCNC: 140 MG/DL — HIGH (ref 70–99)
GLUCOSE BLDC GLUCOMTR-MCNC: 141 MG/DL — HIGH (ref 70–99)
GLUCOSE BLDC GLUCOMTR-MCNC: 142 MG/DL — HIGH (ref 70–99)
GLUCOSE SERPL-MCNC: 119 MG/DL — HIGH (ref 70–99)
HCT VFR BLD CALC: 29 % — LOW (ref 42–52)
HGB BLD-MCNC: 9.5 G/DL — LOW (ref 14–18)
IMM GRANULOCYTES NFR BLD AUTO: 0.6 % — HIGH (ref 0.1–0.3)
LYMPHOCYTES # BLD AUTO: 0.98 K/UL — LOW (ref 1.2–3.4)
LYMPHOCYTES # BLD AUTO: 12.7 % — LOW (ref 20.5–51.1)
MAGNESIUM SERPL-MCNC: 2.5 MG/DL — HIGH (ref 1.8–2.4)
MCHC RBC-ENTMCNC: 29.3 PG — SIGNIFICANT CHANGE UP (ref 27–31)
MCHC RBC-ENTMCNC: 32.8 G/DL — SIGNIFICANT CHANGE UP (ref 32–37)
MCV RBC AUTO: 89.5 FL — SIGNIFICANT CHANGE UP (ref 80–94)
MONOCYTES # BLD AUTO: 0.9 K/UL — HIGH (ref 0.1–0.6)
MONOCYTES NFR BLD AUTO: 11.7 % — HIGH (ref 1.7–9.3)
NEUTROPHILS # BLD AUTO: 5.65 K/UL — SIGNIFICANT CHANGE UP (ref 1.4–6.5)
NEUTROPHILS NFR BLD AUTO: 73.4 % — SIGNIFICANT CHANGE UP (ref 42.2–75.2)
NRBC # BLD: 0 /100 WBCS — SIGNIFICANT CHANGE UP (ref 0–0)
PLATELET # BLD AUTO: 104 K/UL — LOW (ref 130–400)
POTASSIUM SERPL-MCNC: 4 MMOL/L — SIGNIFICANT CHANGE UP (ref 3.5–5)
POTASSIUM SERPL-SCNC: 4 MMOL/L — SIGNIFICANT CHANGE UP (ref 3.5–5)
RBC # BLD: 3.24 M/UL — LOW (ref 4.7–6.1)
RBC # FLD: 14.6 % — HIGH (ref 11.5–14.5)
SODIUM SERPL-SCNC: 136 MMOL/L — SIGNIFICANT CHANGE UP (ref 135–146)
WBC # BLD: 7.7 K/UL — SIGNIFICANT CHANGE UP (ref 4.8–10.8)
WBC # FLD AUTO: 7.7 K/UL — SIGNIFICANT CHANGE UP (ref 4.8–10.8)

## 2019-07-02 PROCEDURE — 99233 SBSQ HOSP IP/OBS HIGH 50: CPT

## 2019-07-02 PROCEDURE — 71045 X-RAY EXAM CHEST 1 VIEW: CPT | Mod: 26

## 2019-07-02 RX ORDER — METOPROLOL TARTRATE 50 MG
5 TABLET ORAL ONCE
Refills: 0 | Status: COMPLETED | OUTPATIENT
Start: 2019-07-02 | End: 2019-07-02

## 2019-07-02 RX ORDER — SENNA PLUS 8.6 MG/1
2 TABLET ORAL AT BEDTIME
Refills: 0 | Status: DISCONTINUED | OUTPATIENT
Start: 2019-07-02 | End: 2019-07-05

## 2019-07-02 RX ORDER — HEPARIN SODIUM 5000 [USP'U]/ML
5000 INJECTION INTRAVENOUS; SUBCUTANEOUS EVERY 8 HOURS
Refills: 0 | Status: DISCONTINUED | OUTPATIENT
Start: 2019-07-02 | End: 2019-07-05

## 2019-07-02 RX ORDER — ATORVASTATIN CALCIUM 80 MG/1
20 TABLET, FILM COATED ORAL AT BEDTIME
Refills: 0 | Status: DISCONTINUED | OUTPATIENT
Start: 2019-07-02 | End: 2019-07-05

## 2019-07-02 RX ORDER — METOPROLOL TARTRATE 50 MG
50 TABLET ORAL
Refills: 0 | Status: DISCONTINUED | OUTPATIENT
Start: 2019-07-02 | End: 2019-07-05

## 2019-07-02 RX ORDER — ONDANSETRON 8 MG/1
4 TABLET, FILM COATED ORAL ONCE
Refills: 0 | Status: COMPLETED | OUTPATIENT
Start: 2019-07-02 | End: 2019-07-02

## 2019-07-02 RX ADMIN — Medication 81 MILLIGRAM(S): at 12:24

## 2019-07-02 RX ADMIN — AMIODARONE HYDROCHLORIDE 400 MILLIGRAM(S): 400 TABLET ORAL at 05:20

## 2019-07-02 RX ADMIN — Medication 50 MICROGRAM(S): at 05:20

## 2019-07-02 RX ADMIN — HEPARIN SODIUM 5000 UNIT(S): 5000 INJECTION INTRAVENOUS; SUBCUTANEOUS at 05:20

## 2019-07-02 RX ADMIN — Medication 50 MILLIGRAM(S): at 18:02

## 2019-07-02 RX ADMIN — Medication 600 MILLIGRAM(S): at 18:02

## 2019-07-02 RX ADMIN — Medication 100 MILLIGRAM(S): at 05:22

## 2019-07-02 RX ADMIN — Medication 100 MILLIGRAM(S): at 22:11

## 2019-07-02 RX ADMIN — AMIODARONE HYDROCHLORIDE 400 MILLIGRAM(S): 400 TABLET ORAL at 22:11

## 2019-07-02 RX ADMIN — SENNA PLUS 2 TABLET(S): 8.6 TABLET ORAL at 22:11

## 2019-07-02 RX ADMIN — Medication 100 MILLIGRAM(S): at 13:26

## 2019-07-02 RX ADMIN — ONDANSETRON 4 MILLIGRAM(S): 8 TABLET, FILM COATED ORAL at 12:00

## 2019-07-02 RX ADMIN — HEPARIN SODIUM 5000 UNIT(S): 5000 INJECTION INTRAVENOUS; SUBCUTANEOUS at 22:11

## 2019-07-02 RX ADMIN — HEPARIN SODIUM 5000 UNIT(S): 5000 INJECTION INTRAVENOUS; SUBCUTANEOUS at 13:26

## 2019-07-02 RX ADMIN — Medication 100 GRAM(S): at 18:02

## 2019-07-02 RX ADMIN — Medication 5 MILLIGRAM(S): at 04:15

## 2019-07-02 RX ADMIN — AMIODARONE HYDROCHLORIDE 400 MILLIGRAM(S): 400 TABLET ORAL at 13:26

## 2019-07-02 RX ADMIN — Medication 40 MILLIGRAM(S): at 06:47

## 2019-07-02 RX ADMIN — AMLODIPINE BESYLATE 5 MILLIGRAM(S): 2.5 TABLET ORAL at 05:20

## 2019-07-02 RX ADMIN — Medication 3 MILLILITER(S): at 19:17

## 2019-07-02 RX ADMIN — Medication 25 MILLIGRAM(S): at 05:20

## 2019-07-02 RX ADMIN — FAMOTIDINE 20 MILLIGRAM(S): 10 INJECTION INTRAVENOUS at 12:24

## 2019-07-02 RX ADMIN — Medication 100 GRAM(S): at 05:21

## 2019-07-02 RX ADMIN — ATORVASTATIN CALCIUM 20 MILLIGRAM(S): 80 TABLET, FILM COATED ORAL at 22:12

## 2019-07-02 RX ADMIN — Medication 600 MILLIGRAM(S): at 05:20

## 2019-07-02 RX ADMIN — Medication 50 MILLIGRAM(S): at 08:51

## 2019-07-02 NOTE — PROGRESS NOTE ADULT - SUBJECTIVE AND OBJECTIVE BOX
CTU Attending Progress Daily Note     02 Jul 2019 10:42  POD# -   He has history of Prostate cancer  Old myocardial infarction  Hypothyroid  Essential hypertension    Interval event for past 24 hr:  LILIA SIERRA  87y had no event.   Current Complains:  LILIA SIERRA has no new complains  HPI:  87 yrs old with Hx of CAD ( MI 30ys ago) presented for elective LHC.  pt was having chest pain on minimal exertion . today pt had an episode of chest pain with ECG revealing ST depressions.  currently pain free. on nitro drip. (27 Jun 2019 07:23)    OBJECTIVE:  ICU Vital Signs Last 24 Hrs  T(C): 35.9 (02 Jul 2019 10:00), Max: 37.1 (01 Jul 2019 16:00)  T(F): 96.7 (02 Jul 2019 10:00), Max: 98.7 (01 Jul 2019 16:00)  HR: 91 (02 Jul 2019 10:05) (62 - 99)  BP: 124/65 (02 Jul 2019 10:05) (124/65 - 178/73)  BP(mean): 89 (02 Jul 2019 10:05) (86 - 105)  ABP: --  ABP(mean): --  RR: 20 (02 Jul 2019 10:05) (15 - 28)  SpO2: 94% (02 Jul 2019 10:05) (93% - 98%)    I&O's Summary    01 Jul 2019 07:01  -  02 Jul 2019 07:00  --------------------------------------------------------  IN: 1040 mL / OUT: 1765 mL / NET: -725 mL    02 Jul 2019 07:01  -  02 Jul 2019 10:42  --------------------------------------------------------  IN: 200 mL / OUT: 550 mL / NET: -350 mL      I&O's Detail    01 Jul 2019 07:01  -  02 Jul 2019 07:00  --------------------------------------------------------  IN:    IV PiggyBack: 400 mL    Oral Fluid: 640 mL  Total IN: 1040 mL    OUT:    Voided: 1765 mL  Total OUT: 1765 mL    Total NET: -725 mL      02 Jul 2019 07:01  -  02 Jul 2019 10:42  --------------------------------------------------------  IN:    Oral Fluid: 200 mL  Total IN: 200 mL    OUT:    Voided: 550 mL  Total OUT: 550 mL    Total NET: -350 mL        Adult Advanced Hemodynamics Last 24 Hrs  CVP(mm Hg): --  CVP(cm H2O): --  CO: --  CI: --  PA: --  PA(mean): --  PCWP: --  SVR: --  SVRI: --  PVR: --  PVRI: --    CAPILLARY BLOOD GLUCOSE  140 (02 Jul 2019 10:00)      POCT Blood Glucose.: 140 mg/dL (02 Jul 2019 10:03)  POCT Blood Glucose.: 141 mg/dL (02 Jul 2019 06:52)  POCT Blood Glucose.: 128 mg/dL (01 Jul 2019 22:22)  POCT Blood Glucose.: 133 mg/dL (01 Jul 2019 16:20)  POCT Blood Glucose.: 139 mg/dL (01 Jul 2019 11:17)    LABS:                          9.5    7.70  )-----------( 104      ( 02 Jul 2019 01:20 )             29.0     07-02    136  |  100  |  21<H>  ----------------------------<  119<H>  4.0   |  28  |  0.9    Ca    7.8<L>      02 Jul 2019 01:20  Mg     2.5     07-02    TPro  5.2<L>  /  Alb  3.0<L>  /  TBili  0.6  /  DBili  0.2  /  AST  46<H>  /  ALT  42<H>  /  AlkPhos  87  07-01          Home Medications:  aspirin 81 mg oral tablet: 1 tab(s) orally once a day (27 Jun 2019 07:30)  atorvastatin 20 mg oral tablet: 1 tab(s) orally once a day (27 Jun 2019 07:30)  isosorbide mononitrate 30 mg oral tablet, extended release: 1 tab(s) orally once a day (in the morning) (27 Jun 2019 07:30)  nitroglycerin 0.4 mg sublingual tablet: pt took in cath lab bathroom in cath lab (27 Jun 2019 07:30)  propranolol 60 mg oral capsule, extended release: 1 cap(s) orally once a day (27 Jun 2019 07:30)  Synthroid 50 mcg (0.05 mg) oral tablet: 1 tab(s) orally once a day (27 Jun 2019 07:30)    HOSPITAL MEDICATIONS:  MEDICATIONS  (STANDING):  ALBUTerol/ipratropium for Nebulization 3 milliLiter(s) Nebulizer every 6 hours  amiodarone    Tablet 400 milliGRAM(s) Oral every 8 hours  amLODIPine   Tablet 5 milliGRAM(s) Oral daily  aspirin enteric coated 81 milliGRAM(s) Oral daily  chlorhexidine 4% Liquid 1 Application(s) Topical <User Schedule>  dextrose 5%. 1000 milliLiter(s) (50 mL/Hr) IV Continuous <Continuous>  dextrose 50% Injectable 12.5 Gram(s) IV Push once  dextrose 50% Injectable 25 Gram(s) IV Push once  docusate sodium 100 milliGRAM(s) Oral three times a day  famotidine    Tablet 20 milliGRAM(s) Oral daily  furosemide   Injectable 40 milliGRAM(s) IV Push daily  guaiFENesin  milliGRAM(s) Oral every 12 hours  heparin  Injectable 5000 Unit(s) SubCutaneous every 8 hours  insulin lispro (HumaLOG) corrective regimen sliding scale   SubCutaneous three times a day before meals  levothyroxine 50 MICROGram(s) Oral daily  magnesium sulfate  IVPB 1 Gram(s) IV Intermittent every 12 hours  metoprolol tartrate 50 milliGRAM(s) Oral two times a day  senna 2 Tablet(s) Oral at bedtime  sodium chloride 0.9%. 1000 milliLiter(s) (20 mL/Hr) IV Continuous <Continuous>    MEDICATIONS  (PRN):  glucagon  Injectable 1 milliGRAM(s) IntraMuscular once PRN Glucose LESS THAN 70 milligrams/deciliter  ketorolac   Injectable 15 milliGRAM(s) IV Push every 6 hours PRN Moderate Pain (4 - 6)      REVIEW OF SYSTEMS:  CONSTITUTIONAL: [X] all negative; [ ] weakness, [ ] fevers, [ ] chills  EYES/ENT: [X] all negative; [ ] visual changes, [ ] vertigo, [ ] throat pain   NECK: [X] all negative; [ ] pain, [ ] stiffness  RESPIRATORY: [] all negative, [ ] cough, [ ] wheezing, [ ] hemoptysis, [ ] shortness of breath  CARDIOVASCULAR: [] all negative; [ ] chest pain, [ ] palpitations, [ ] orthopnea  GASTROINTESTINAL: [X] all negative; [ ]abdominal pain, [ ] nausea, [ ] vomiting, [ ] hematemesis, [ ] diarrhea, [ ] constipation, [ ] melena, [ ] hematochezia.  GENITOURINARY: [X] all negative; [ ] dysuria, [ ] frequency, [ ] hematuria  NEUROLOGICAL: [X] all negative; [ ] numbness, [ ] weakness  SKIN: [X] all negative; [ ] itching, [ ] burning, [ ] rashes, [ ] lesions   All other review of systems is negative unless indicated above.    [  ] Unable to assess ROS because     PHYSICAL EXAM:          CONSTITUTIONAL: Well-developed; well-nourished; in no acute distress.   	SKIN: warm, dry  	HEAD: Normocephalic; atraumatic.  	EYES: PERRL, EOM, no conj injection, sclera clear  	ENT: No nasal discharge; airway clear.  	NECK: Supple; non tender.  No midline ttp ctls  	CARD: S1, S2 normal; no murmurs, gallops, or rubs. Regular rate and rhythm. 2+ RPs and DPs bilat, no carotid bruits, no pedal   edema, no calf pain b/l  	RESP: CTA  bilat good air movement No wheezes, rales or rhonchi.  	ABD: Soft, not tender, not distended, no CVA ttp no rebound or guarding, bowel sounds present  	EXT: Normal ROM.  No clubbing, cyanosis or edema.   	  	NEURO: Alert, awake, motor 5/5 R, 5/5 L        RADIOLOGY:  xray    Bilat smal effusion fluid congestion

## 2019-07-02 NOTE — PROGRESS NOTE ADULT - SUBJECTIVE AND OBJECTIVE BOX
OPERATIVE PROCEDURE(s):                POD #                       87yMale  SURGEON(s): LEE Edmondson  SUBJECTIVE ASSESSMENT: pt seen and examined.  Vital Signs Last 24 Hrs  T(F): 98.3 (02 Jul 2019 04:00), Max: 98.7 (01 Jul 2019 16:00)  HR: 86 (02 Jul 2019 06:00) (62 - 94)  BP: 125/66 (02 Jul 2019 06:00) (125/66 - 178/73)  BP(mean): 90 (02 Jul 2019 06:00) (86 - 105)  RR: 22 (02 Jul 2019 06:00) (15 - 28)  SpO2: 96% (02 Jul 2019 06:00) (93% - 98%)      I&O's Detail    01 Jul 2019 07:01  -  02 Jul 2019 07:00  --------------------------------------------------------  IN:    IV PiggyBack: 400 mL    Oral Fluid: 640 mL  Total IN: 1040 mL    OUT:    Voided: 1765 mL  Total OUT: 1765 mL        Net:   I&O's Detail    30 Jun 2019 07:01  -  01 Jul 2019 07:00  --------------------------------------------------------  Total NET: -612 mL      01 Jul 2019 07:01  -  02 Jul 2019 07:00  --------------------------------------------------------  Total NET: -725 mL        CAPILLARY BLOOD GLUCOSE  123 (01 Jul 2019 06:00)      POCT Blood Glucose.: 141 mg/dL (02 Jul 2019 06:52)  POCT Blood Glucose.: 128 mg/dL (01 Jul 2019 22:22)  POCT Blood Glucose.: 133 mg/dL (01 Jul 2019 16:20)  POCT Blood Glucose.: 139 mg/dL (01 Jul 2019 11:17)    Physical Exam:  General: NAD; A&Ox3/Patient is intubated and sedated  Cardiac: S1/S2, RRR, no murmur, no rubs  Lungs: unlabored respirations, CTA b/l, no wheeze, no rales, no crackles  Abdomen: Soft/NT/ND; positive bowel sounds x 4  Sternum: Intact, no click, incision healing well with no drainage  Incisions: Incisions clean/dry/intact  Extremities: No edema b/l lower extremities; good capillary refill; no cyanosis; palpable 1+ pedal pulses b/l    Central Venous Catheter: Yes[]  No[] , If Yes indication:           Day #  Silva Catheter: Yes  [] , No  [] , If yes indication:                      Day #  NGT: Yes [] No [] ,    If Yes Placement:                                     Day #  EPICARDIAL WIRES:  [] YES [] NO                                              Day #  BOWEL MOVEMENT:  [] YES [] NO, If No, Timing since last BM:      Day #  CHEST TUBE(Left/Right):  [] YES [] NO, If yes -  AIR LEAKS:  [] YES [] NO        LABS:                        9.5<L>  7.70  )-----------( 104<L>    ( 02 Jul 2019 01:20 )             29.0<L>                        9.4<L>  7.65  )-----------( 87<L>    ( 01 Jul 2019 01:10 )             29.2<L>    07-02    136  |  100  |  21<H>  ----------------------------<  119<H>  4.0   |  28  |  0.9  07-01    137  |  101  |  20  ----------------------------<  135<H>  4.1   |  27  |  1.0    Ca    7.8<L>      02 Jul 2019 01:20  Mg     2.5     07-02    TPro  5.2<L> [6.0 - 8.0]  /  Alb  3.0<L> [3.5 - 5.2]  /  TBili  0.6 [0.2 - 1.2]  /  DBili  0.2 [0.0 - 0.2]  /  AST  46<H> [0 - 41]  /  ALT  42<H> [0 - 41]  /  AlkPhos  87 [30 - 115]  07-01          RADIOLOGY & ADDITIONAL TESTS:  CXR:  EKG:  MEDICATIONS  (STANDING):  ALBUTerol/ipratropium for Nebulization 3 milliLiter(s) Nebulizer every 6 hours  amiodarone    Tablet 400 milliGRAM(s) Oral every 8 hours  amLODIPine   Tablet 5 milliGRAM(s) Oral daily  aspirin enteric coated 81 milliGRAM(s) Oral daily  chlorhexidine 4% Liquid 1 Application(s) Topical <User Schedule>  dextrose 5%. 1000 milliLiter(s) (50 mL/Hr) IV Continuous <Continuous>  dextrose 50% Injectable 12.5 Gram(s) IV Push once  dextrose 50% Injectable 25 Gram(s) IV Push once  dextrose 50% Injectable 25 Gram(s) IV Push once  docusate sodium 100 milliGRAM(s) Oral three times a day  famotidine    Tablet 20 milliGRAM(s) Oral daily  furosemide   Injectable 40 milliGRAM(s) IV Push daily  guaiFENesin  milliGRAM(s) Oral every 12 hours  heparin  Injectable 5000 Unit(s) SubCutaneous every 12 hours  insulin lispro (HumaLOG) corrective regimen sliding scale   SubCutaneous three times a day before meals  labetalol Injectable 10 milliGRAM(s) IV Push once  levothyroxine 50 MICROGram(s) Oral daily  magnesium sulfate  IVPB 1 Gram(s) IV Intermittent every 12 hours  metoprolol tartrate 25 milliGRAM(s) Oral two times a day  sodium chloride 0.9%. 1000 milliLiter(s) (20 mL/Hr) IV Continuous <Continuous>    MEDICATIONS  (PRN):  dextrose 40% Gel 15 Gram(s) Oral once PRN Blood Glucose LESS THAN 70 milliGRAM(s)/deciliter  glucagon  Injectable 1 milliGRAM(s) IntraMuscular once PRN Glucose LESS THAN 70 milligrams/deciliter  ketorolac   Injectable 15 milliGRAM(s) IV Push every 6 hours PRN Moderate Pain (4 - 6)    HEPARIN:  [] YES [] NO  Dose: XX UNITS/HR UNITS Q8H  LOVENOX:[] YES [] NO  Dose: XX mg Q24H  COUMADIN: []  YES [] NO  Dose: XX mg  Q24H  SCD's: YES b/l  GI Prophylaxis: Protonix [], Pepcid [], None [], (Contra-indication:.....)    Post-Op Beta-Blockers: Yes [], No[], If No, then contraindication:  Post-Op Aspirin: Yes [],  No [], If No, then contraindication:  Post-Op Statin: Yes [], No[], If No, then contraindication:  Allergies    No Known Allergies    Intolerances      Ambulation/Activity Status:    Assessment/Plan:  87y Male status-post .....  - Case and plan discussed with CTU Intensivist and CT Surgeon - Dr. Anderson/Casandra/Debo   - Continue CTU supportive care    - Continue DVT/GI prophylaxis  - Incentive Spirometry 10 times an hour  - Continue to advance physical activity as tolerated and continue PT/OT as directed  1. CAD: Continue ASA, statin, BB  2. HTN:   3. A. Fib:   4. COPD/Hypoxia:   5. DM/Glucose Control:     Social Service Disposition: OPERATIVE PROCEDURE(s):    CABGx4            POD # 4                      87yMale  SURGEON(s): LEE Edmondson  SUBJECTIVE ASSESSMENT: pt seen and examined. no acute complaints   Vital Signs Last 24 Hrs  T(F): 98.3 (02 Jul 2019 04:00), Max: 98.7 (01 Jul 2019 16:00)  HR: 86 (02 Jul 2019 06:00) (62 - 94)  BP: 125/66 (02 Jul 2019 06:00) (125/66 - 178/73)  BP(mean): 90 (02 Jul 2019 06:00) (86 - 105)  RR: 22 (02 Jul 2019 06:00) (15 - 28)  SpO2: 96% (02 Jul 2019 06:00) (93% - 98%)      I&O's Detail    01 Jul 2019 07:01  -  02 Jul 2019 07:00  --------------------------------------------------------  IN:    IV PiggyBack: 400 mL    Oral Fluid: 640 mL  Total IN: 1040 mL    OUT:    Voided: 1765 mL  Total OUT: 1765 mL        Net:   I&O's Detail    30 Jun 2019 07:01  -  01 Jul 2019 07:00  --------------------------------------------------------  Total NET: -612 mL      01 Jul 2019 07:01  -  02 Jul 2019 07:00  --------------------------------------------------------  Total NET: -725 mL        CAPILLARY BLOOD GLUCOSE  123 (01 Jul 2019 06:00)      POCT Blood Glucose.: 141 mg/dL (02 Jul 2019 06:52)  POCT Blood Glucose.: 128 mg/dL (01 Jul 2019 22:22)  POCT Blood Glucose.: 133 mg/dL (01 Jul 2019 16:20)  POCT Blood Glucose.: 139 mg/dL (01 Jul 2019 11:17)    Physical Exam:  General: NAD; A&Ox3  Cardiac: S1/S2, RRR, no murmur, no rubs  Lungs: decreased bs at bases bilaterally  Abdomen: Soft/NT/ND; positive bowel sounds x 4  Sternum: Intact, no click, incision healing well with no drainage  Incisions: Incisions clean/dry/intact  Extremities:  edema b/l lower extremities; good capillary refill; no cyanosis; palpable 1+ pedal pulses b/l    Central Venous Catheter: Yes[x]  No[] , If Yes indication:    access       Day #4  Silva Catheter: Yes  [] , No  [x] , If yes indication:                      Day #  NGT: Yes [] No [x] ,    If Yes Placement:                                     Day #  EPICARDIAL WIRES:  [x] YES [] NO                                              Day #4  BOWEL MOVEMENT:  [x] YES [] NO, If No, Timing since last BM:      Day #  CHEST TUBE(Left/Right):  [] YES [x] NO, If yes -  AIR LEAKS:  [] YES [] NO        LABS:                        9.5<L>  7.70  )-----------( 104<L>    ( 02 Jul 2019 01:20 )             29.0<L>                        9.4<L>  7.65  )-----------( 87<L>    ( 01 Jul 2019 01:10 )             29.2<L>    07-02    136  |  100  |  21<H>  ----------------------------<  119<H>  4.0   |  28  |  0.9  07-01    137  |  101  |  20  ----------------------------<  135<H>  4.1   |  27  |  1.0    Ca    7.8<L>      02 Jul 2019 01:20  Mg     2.5     07-02    TPro  5.2<L> [6.0 - 8.0]  /  Alb  3.0<L> [3.5 - 5.2]  /  TBili  0.6 [0.2 - 1.2]  /  DBili  0.2 [0.0 - 0.2]  /  AST  46<H> [0 - 41]  /  ALT  42<H> [0 - 41]  /  AlkPhos  87 [30 - 115]  07-01          RADIOLOGY & ADDITIONAL TESTS:  CXR: < from: Xray Chest 1 View- PORTABLE-Routine (07.02.19 @ 05:48) >  Impression:      Bilateral opacifications and effusions.    Without difference.    < end of copied text >    EKG: < from: 12 Lead ECG (07.01.19 @ 07:47) >  Ventricular Rate 65 BPM    Atrial Rate 65 BPM    P-R Interval 160 ms    QRS Duration 138 ms    Q-T Interval 470 ms    QTC Calculation(Bezet) 488 ms    P Axis 90 degrees    R Axis 32 degrees    T Axis -17 degrees    Diagnosis Line Normal sinus rhythm  Right bundle branch block  Cannot rule out Inferior infarct , age undetermined  T wave abnormality, consider lateral ischemia  Prolonged QT  Abnormal ECG    < end of copied text >    MEDICATIONS  (STANDING):  ALBUTerol/ipratropium for Nebulization 3 milliLiter(s) Nebulizer every 6 hours  amiodarone    Tablet 400 milliGRAM(s) Oral every 8 hours  amLODIPine   Tablet 5 milliGRAM(s) Oral daily  aspirin enteric coated 81 milliGRAM(s) Oral daily  chlorhexidine 4% Liquid 1 Application(s) Topical <User Schedule>  dextrose 5%. 1000 milliLiter(s) (50 mL/Hr) IV Continuous <Continuous>  dextrose 50% Injectable 12.5 Gram(s) IV Push once  dextrose 50% Injectable 25 Gram(s) IV Push once  dextrose 50% Injectable 25 Gram(s) IV Push once  docusate sodium 100 milliGRAM(s) Oral three times a day  famotidine    Tablet 20 milliGRAM(s) Oral daily  furosemide   Injectable 40 milliGRAM(s) IV Push daily  guaiFENesin  milliGRAM(s) Oral every 12 hours  heparin  Injectable 5000 Unit(s) SubCutaneous every 12 hours  insulin lispro (HumaLOG) corrective regimen sliding scale   SubCutaneous three times a day before meals  labetalol Injectable 10 milliGRAM(s) IV Push once  levothyroxine 50 MICROGram(s) Oral daily  magnesium sulfate  IVPB 1 Gram(s) IV Intermittent every 12 hours  metoprolol tartrate 25 milliGRAM(s) Oral two times a day  sodium chloride 0.9%. 1000 milliLiter(s) (20 mL/Hr) IV Continuous <Continuous>    MEDICATIONS  (PRN):  dextrose 40% Gel 15 Gram(s) Oral once PRN Blood Glucose LESS THAN 70 milliGRAM(s)/deciliter  glucagon  Injectable 1 milliGRAM(s) IntraMuscular once PRN Glucose LESS THAN 70 milligrams/deciliter  ketorolac   Injectable 15 milliGRAM(s) IV Push every 6 hours PRN Moderate Pain (4 - 6)    HEPARIN:  [x] YES [] NO  Dose: 5000 UNITS Q8H  LOVENOX:[] YES [x] NO  Dose: XX mg Q24H  COUMADIN: []  YES [x] NO  Dose: XX mg  Q24H  SCD's: YES b/l  GI Prophylaxis: Protonix [], Pepcid [x], None [], (Contra-indication:.....)    Post-Op Beta-Blockers: Yes [x], No[], If No, then contraindication:  Post-Op Aspirin: Yes [x],  No [], If No, then contraindication:  Post-Op Statin: Yes [], No[x], If No, then contraindication: elevated lfts   Allergies    No Known Allergies    Intolerances      Ambulation/Activity Status: ambulate as tolerated     Assessment/Plan:  87 year-old male s/p CABGx4 POD#4  -case discussed with Dr. Edmondson  - Continue DVT/GI prophylaxis   - Incentive Spirometry 10 times an hour  - Continue to advance physical activity as tolerated and continue PT/OT as directed  1. CAD: Continue ASA,  BB, restart statin  2. HTN: increase lopressor, cont norvasc   3. A. Fib: cont po amio, cont metoprolol  4. COPD/Hypoxia: cont nebs, mucinex, wean o2 as tolerated, lasix for fluid overload  5. DM/Glucose Control: insulin sliding scale    Social Service Disposition:  rehab most likely OPERATIVE PROCEDURE(s):    CABGx4            POD # 4                      87yMale  SURGEON(s): LEE Edmondson  SUBJECTIVE ASSESSMENT: pt seen and examined. no acute complaints   Vital Signs Last 24 Hrs  T(F): 98.3 (02 Jul 2019 04:00), Max: 98.7 (01 Jul 2019 16:00)  HR: 86 (02 Jul 2019 06:00) (62 - 94)  BP: 125/66 (02 Jul 2019 06:00) (125/66 - 178/73)  BP(mean): 90 (02 Jul 2019 06:00) (86 - 105)  RR: 22 (02 Jul 2019 06:00) (15 - 28)  SpO2: 96% (02 Jul 2019 06:00) (93% - 98%)    I&O's Detail    01 Jul 2019 07:01  -  02 Jul 2019 07:00  --------------------------------------------------------  IN:    IV PiggyBack: 400 mL    Oral Fluid: 640 mL  Total IN: 1040 mL    OUT:    Voided: 1765 mL  Total OUT: 1765 mL    Net:   I&O's Detail    30 Jun 2019 07:01  -  01 Jul 2019 07:00  --------------------------------------------------------  Total NET: -612 mL      01 Jul 2019 07:01  -  02 Jul 2019 07:00  --------------------------------------------------------  Total NET: -725 mL    CAPILLARY BLOOD GLUCOSE  123 (01 Jul 2019 06:00)  POCT Blood Glucose.: 141 mg/dL (02 Jul 2019 06:52)  POCT Blood Glucose.: 128 mg/dL (01 Jul 2019 22:22)  POCT Blood Glucose.: 133 mg/dL (01 Jul 2019 16:20)  POCT Blood Glucose.: 139 mg/dL (01 Jul 2019 11:17)    Physical Exam:  General: NAD; A&Ox3  Cardiac: S1/S2, RRR, no murmur, no rubs  Lungs: decreased bs at bases bilaterally  Abdomen: Soft/NT/ND; positive bowel sounds x 4  Sternum: Intact, no click, incision healing well with no drainage  Incisions: Incisions clean/dry/intact  Extremities:  edema b/l lower extremities; good capillary refill; no cyanosis; palpable 1+ pedal pulses b/l      LABS:                        9.5<L>  7.70  )-----------( 104<L>    ( 02 Jul 2019 01:20 )             29.0<L>                        9.4<L>  7.65  )-----------( 87<L>    ( 01 Jul 2019 01:10 )             29.2<L>    07-02    136  |  100  |  21<H>  ----------------------------<  119<H>  4.0   |  28  |  0.9  07-01    137  |  101  |  20  ----------------------------<  135<H>  4.1   |  27  |  1.0    Ca    7.8<L>      02 Jul 2019 01:20  Mg     2.5     07-02    TPro  5.2<L> [6.0 - 8.0]  /  Alb  3.0<L> [3.5 - 5.2]  /  TBili  0.6 [0.2 - 1.2]  /  DBili  0.2 [0.0 - 0.2]  /  AST  46<H> [0 - 41]  /  ALT  42<H> [0 - 41]  /  AlkPhos  87 [30 - 115]  07-01    RADIOLOGY & ADDITIONAL TESTS:  CXR: < from: Xray Chest 1 View- PORTABLE-Routine (07.02.19 @ 05:48) >  Impression:      Bilateral opacifications and effusions.    Without difference.    < end of copied text >    EKG: < from: 12 Lead ECG (07.01.19 @ 07:47) >  Ventricular Rate 65 BPM    Atrial Rate 65 BPM    P-R Interval 160 ms    QRS Duration 138 ms    Q-T Interval 470 ms    QTC Calculation(Bezet) 488 ms    P Axis 90 degrees    R Axis 32 degrees    T Axis -17 degrees    Diagnosis Line Normal sinus rhythm  Right bundle branch block  Cannot rule out Inferior infarct , age undetermined  T wave abnormality, consider lateral ischemia  Prolonged QT  Abnormal ECG    < end of copied text >    MEDICATIONS  (STANDING):  ALBUTerol/ipratropium for Nebulization 3 milliLiter(s) Nebulizer every 6 hours  amiodarone    Tablet 400 milliGRAM(s) Oral every 8 hours  amLODIPine   Tablet 5 milliGRAM(s) Oral daily  aspirin enteric coated 81 milliGRAM(s) Oral daily  docusate sodium 100 milliGRAM(s) Oral three times a day  famotidine    Tablet 20 milliGRAM(s) Oral daily  furosemide   Injectable 40 milliGRAM(s) IV Push daily  guaiFENesin  milliGRAM(s) Oral every 12 hours  heparin  Injectable 5000 Unit(s) SubCutaneous every 12 hours  insulin lispro (HumaLOG) corrective regimen sliding scale   SubCutaneous three times a day before meals  labetalol Injectable 10 milliGRAM(s) IV Push once  levothyroxine 50 MICROGram(s) Oral daily  magnesium sulfate  IVPB 1 Gram(s) IV Intermittent every 12 hours  metoprolol tartrate 25 milliGRAM(s) Oral two times a day  sodium chloride 0.9%. 1000 milliLiter(s) (20 mL/Hr) IV Continuous <Continuous>    MEDICATIONS  (PRN):  dextrose 40% Gel 15 Gram(s) Oral once PRN Blood Glucose LESS THAN 70 milliGRAM(s)/deciliter  glucagon  Injectable 1 milliGRAM(s) IntraMuscular once PRN Glucose LESS THAN 70 milligrams/deciliter  ketorolac   Injectable 15 milliGRAM(s) IV Push every 6 hours PRN Moderate Pain (4 - 6)    HEPARIN:  [x] YES [] NO  Dose: 5000 UNITS Q8H  LOVENOX:[] YES [x] NO  Dose: XX mg Q24H  COUMADIN: []  YES [x] NO  Dose: XX mg  Q24H  SCD's: YES b/l  GI Prophylaxis: Protonix [], Pepcid [x], None [], (Contra-indication:.....)    Post-Op Beta-Blockers: Yes [x], No[], If No, then contraindication:  Post-Op Aspirin: Yes [x],  No [], If No, then contraindication:  Post-Op Statin: Yes [], No[x], If No, then contraindication: elevated lfts   Allergies    No Known Allergies    Intolerances  Ambulation/Activity Status: ambulate as tolerated     Assessment/Plan:  87 year-old male s/p CABGx4 POD#4  -case discussed with Dr. Edmondson  - Continue DVT/GI prophylaxis   - Incentive Spirometry 10 times an hour  - Continue to advance physical activity as tolerated and continue PT/OT as directed  1. CAD: Continue ASA,  BB, restart statin  2. HTN: increase lopressor, cont norvasc   3. A. Fib: cont po amio, cont metoprolol  4. COPD/Hypoxia: cont nebs, mucinex, wean o2 as tolerated, lasix for fluid overload  5. DM/Glucose Control: insulin sliding scale    Social Service Disposition:  rehab most likely

## 2019-07-02 NOTE — PROGRESS NOTE ADULT - ATTENDING COMMENTS
POD 4 after C4IMA  doing well  afib, on amio  considering coumadin  cont ASA, bb  cont diuresis (negative 700cc/24hrs)  cont SQH  R pleural effusion, will trend CXRs  no pulm distress  Cont pulmonary toilet, Chest PT, pain control, Incentive spirometry  OOB ambulate  dispo planning  case d/w CTU team

## 2019-07-03 LAB
ANION GAP SERPL CALC-SCNC: 9 MMOL/L — SIGNIFICANT CHANGE UP (ref 7–14)
BASOPHILS # BLD AUTO: 0.03 K/UL — SIGNIFICANT CHANGE UP (ref 0–0.2)
BASOPHILS NFR BLD AUTO: 0.4 % — SIGNIFICANT CHANGE UP (ref 0–1)
BUN SERPL-MCNC: 20 MG/DL — SIGNIFICANT CHANGE UP (ref 10–20)
CALCIUM SERPL-MCNC: 7.8 MG/DL — LOW (ref 8.5–10.1)
CHLORIDE SERPL-SCNC: 98 MMOL/L — SIGNIFICANT CHANGE UP (ref 98–110)
CO2 SERPL-SCNC: 29 MMOL/L — SIGNIFICANT CHANGE UP (ref 17–32)
CREAT SERPL-MCNC: 1 MG/DL — SIGNIFICANT CHANGE UP (ref 0.7–1.5)
EOSINOPHIL # BLD AUTO: 0.12 K/UL — SIGNIFICANT CHANGE UP (ref 0–0.7)
EOSINOPHIL NFR BLD AUTO: 1.7 % — SIGNIFICANT CHANGE UP (ref 0–8)
GLUCOSE BLDC GLUCOMTR-MCNC: 122 MG/DL — HIGH (ref 70–99)
GLUCOSE BLDC GLUCOMTR-MCNC: 131 MG/DL — HIGH (ref 70–99)
GLUCOSE BLDC GLUCOMTR-MCNC: 144 MG/DL — HIGH (ref 70–99)
GLUCOSE BLDC GLUCOMTR-MCNC: 154 MG/DL — HIGH (ref 70–99)
GLUCOSE SERPL-MCNC: 122 MG/DL — HIGH (ref 70–99)
HCT VFR BLD CALC: 28.9 % — LOW (ref 42–52)
HGB BLD-MCNC: 9.5 G/DL — LOW (ref 14–18)
IMM GRANULOCYTES NFR BLD AUTO: 0.7 % — HIGH (ref 0.1–0.3)
LYMPHOCYTES # BLD AUTO: 0.96 K/UL — LOW (ref 1.2–3.4)
LYMPHOCYTES # BLD AUTO: 13.8 % — LOW (ref 20.5–51.1)
MAGNESIUM SERPL-MCNC: 2.6 MG/DL — HIGH (ref 1.8–2.4)
MCHC RBC-ENTMCNC: 29.5 PG — SIGNIFICANT CHANGE UP (ref 27–31)
MCHC RBC-ENTMCNC: 32.9 G/DL — SIGNIFICANT CHANGE UP (ref 32–37)
MCV RBC AUTO: 89.8 FL — SIGNIFICANT CHANGE UP (ref 80–94)
MONOCYTES # BLD AUTO: 0.92 K/UL — HIGH (ref 0.1–0.6)
MONOCYTES NFR BLD AUTO: 13.2 % — HIGH (ref 1.7–9.3)
NEUTROPHILS # BLD AUTO: 4.87 K/UL — SIGNIFICANT CHANGE UP (ref 1.4–6.5)
NEUTROPHILS NFR BLD AUTO: 70.2 % — SIGNIFICANT CHANGE UP (ref 42.2–75.2)
NRBC # BLD: 0 /100 WBCS — SIGNIFICANT CHANGE UP (ref 0–0)
PLATELET # BLD AUTO: 132 K/UL — SIGNIFICANT CHANGE UP (ref 130–400)
POTASSIUM SERPL-MCNC: 3.8 MMOL/L — SIGNIFICANT CHANGE UP (ref 3.5–5)
POTASSIUM SERPL-SCNC: 3.8 MMOL/L — SIGNIFICANT CHANGE UP (ref 3.5–5)
RBC # BLD: 3.22 M/UL — LOW (ref 4.7–6.1)
RBC # FLD: 14.6 % — HIGH (ref 11.5–14.5)
SODIUM SERPL-SCNC: 136 MMOL/L — SIGNIFICANT CHANGE UP (ref 135–146)
WBC # BLD: 6.95 K/UL — SIGNIFICANT CHANGE UP (ref 4.8–10.8)
WBC # FLD AUTO: 6.95 K/UL — SIGNIFICANT CHANGE UP (ref 4.8–10.8)

## 2019-07-03 PROCEDURE — 71045 X-RAY EXAM CHEST 1 VIEW: CPT | Mod: 26

## 2019-07-03 PROCEDURE — 99233 SBSQ HOSP IP/OBS HIGH 50: CPT

## 2019-07-03 PROCEDURE — 93010 ELECTROCARDIOGRAM REPORT: CPT

## 2019-07-03 RX ORDER — ONDANSETRON 8 MG/1
4 TABLET, FILM COATED ORAL ONCE
Refills: 0 | Status: COMPLETED | OUTPATIENT
Start: 2019-07-03 | End: 2019-07-03

## 2019-07-03 RX ORDER — ASPIRIN/CALCIUM CARB/MAGNESIUM 324 MG
325 TABLET ORAL DAILY
Refills: 0 | Status: DISCONTINUED | OUTPATIENT
Start: 2019-07-03 | End: 2019-07-05

## 2019-07-03 RX ORDER — POTASSIUM CHLORIDE 20 MEQ
20 PACKET (EA) ORAL ONCE
Refills: 0 | Status: COMPLETED | OUTPATIENT
Start: 2019-07-03 | End: 2019-07-03

## 2019-07-03 RX ORDER — AMIODARONE HYDROCHLORIDE 400 MG/1
200 TABLET ORAL DAILY
Refills: 0 | Status: DISCONTINUED | OUTPATIENT
Start: 2019-07-03 | End: 2019-07-03

## 2019-07-03 RX ORDER — AMIODARONE HYDROCHLORIDE 400 MG/1
200 TABLET ORAL EVERY 12 HOURS
Refills: 0 | Status: DISCONTINUED | OUTPATIENT
Start: 2019-07-03 | End: 2019-07-05

## 2019-07-03 RX ADMIN — Medication 600 MILLIGRAM(S): at 17:51

## 2019-07-03 RX ADMIN — FAMOTIDINE 20 MILLIGRAM(S): 10 INJECTION INTRAVENOUS at 12:41

## 2019-07-03 RX ADMIN — Medication 325 MILLIGRAM(S): at 12:41

## 2019-07-03 RX ADMIN — Medication 100 MILLIGRAM(S): at 15:12

## 2019-07-03 RX ADMIN — Medication 40 MILLIGRAM(S): at 05:52

## 2019-07-03 RX ADMIN — Medication 10 MILLIGRAM(S): at 11:00

## 2019-07-03 RX ADMIN — HEPARIN SODIUM 5000 UNIT(S): 5000 INJECTION INTRAVENOUS; SUBCUTANEOUS at 05:16

## 2019-07-03 RX ADMIN — CHLORHEXIDINE GLUCONATE 1 APPLICATION(S): 213 SOLUTION TOPICAL at 05:52

## 2019-07-03 RX ADMIN — Medication 600 MILLIGRAM(S): at 05:07

## 2019-07-03 RX ADMIN — HEPARIN SODIUM 5000 UNIT(S): 5000 INJECTION INTRAVENOUS; SUBCUTANEOUS at 21:56

## 2019-07-03 RX ADMIN — Medication 100 GRAM(S): at 17:51

## 2019-07-03 RX ADMIN — Medication 50 MILLIGRAM(S): at 05:53

## 2019-07-03 RX ADMIN — Medication 50 MILLIGRAM(S): at 17:51

## 2019-07-03 RX ADMIN — ONDANSETRON 4 MILLIGRAM(S): 8 TABLET, FILM COATED ORAL at 09:30

## 2019-07-03 RX ADMIN — Medication 3 MILLILITER(S): at 19:33

## 2019-07-03 RX ADMIN — AMIODARONE HYDROCHLORIDE 400 MILLIGRAM(S): 400 TABLET ORAL at 05:07

## 2019-07-03 RX ADMIN — Medication 1 ENEMA: at 12:15

## 2019-07-03 RX ADMIN — Medication 100 GRAM(S): at 05:07

## 2019-07-03 RX ADMIN — Medication 20 MILLIEQUIVALENT(S): at 06:00

## 2019-07-03 RX ADMIN — Medication 100 MILLIGRAM(S): at 05:07

## 2019-07-03 RX ADMIN — Medication 50 MICROGRAM(S): at 05:07

## 2019-07-03 RX ADMIN — ONDANSETRON 4 MILLIGRAM(S): 8 TABLET, FILM COATED ORAL at 15:50

## 2019-07-03 RX ADMIN — HEPARIN SODIUM 5000 UNIT(S): 5000 INJECTION INTRAVENOUS; SUBCUTANEOUS at 15:12

## 2019-07-03 RX ADMIN — Medication 1: at 16:26

## 2019-07-03 RX ADMIN — ATORVASTATIN CALCIUM 20 MILLIGRAM(S): 80 TABLET, FILM COATED ORAL at 21:56

## 2019-07-03 NOTE — PROGRESS NOTE ADULT - SUBJECTIVE AND OBJECTIVE BOX
SUBJECTIVE ASSESSMENT:  pt has complains of feeling "queasy"    Vital Signs Last 24 Hrs  T(C): 37.1 (03 Jul 2019 08:00), Max: 37.1 (03 Jul 2019 08:00)  T(F): 98.7 (03 Jul 2019 08:00), Max: 98.7 (03 Jul 2019 08:00)  HR: 85 (03 Jul 2019 10:00) (78 - 101)  BP: 113/69 (03 Jul 2019 08:42) (99/57 - 141/81)  BP(mean): 86 (03 Jul 2019 08:42) (75 - 103)  RR: 16 (03 Jul 2019 10:00) (16 - 20)  SpO2: 97% (03 Jul 2019 10:00) (94% - 99%)  07-02-19 @ 07:01  -  07-03-19 @ 07:00  --------------------------------------------------------  IN: 1050 mL / OUT: 1650 mL / NET: -600 mL    07-03-19 @ 07:01  -  07-03-19 @ 10:33  --------------------------------------------------------  IN: 125 mL / OUT: 350 mL / NET: -225 mL      LABS:                        9.5    6.95  )-----------( 132      ( 03 Jul 2019 02:00 )             28.9     07-03    136  |  98  |  20  ----------------------------<  122<H>  3.8   |  29  |  1.0    Ca    7.8<L>      03 Jul 2019 02:00  Mg     2.6     07-03    MEDICATIONS  (STANDING):  ALBUTerol/ipratropium for Nebulization 3 milliLiter(s) Nebulizer every 6 hours  amiodarone    Tablet 200 milliGRAM(s) Oral daily  aspirin enteric coated 325 milliGRAM(s) Oral daily  atorvastatin 20 milliGRAM(s) Oral at bedtime  docusate sodium 100 milliGRAM(s) Oral three times a day  famotidine    Tablet 20 milliGRAM(s) Oral daily  furosemide   Injectable 40 milliGRAM(s) IV Push daily  guaiFENesin  milliGRAM(s) Oral every 12 hours  heparin  Injectable 5000 Unit(s) SubCutaneous every 8 hours  insulin lispro (HumaLOG) corrective regimen sliding scale   SubCutaneous three times a day before meals  levothyroxine 50 MICROGram(s) Oral daily  magnesium sulfate  IVPB 1 Gram(s) IV Intermittent every 12 hours  metoprolol tartrate 50 milliGRAM(s) Oral two times a day  ondansetron Injectable 4 milliGRAM(s) IV Push once  potassium chloride    Tablet ER 20 milliEquivalent(s) Oral once  senna 2 Tablet(s) Oral at bedtime    MEDICATIONS  (PRN):  glucagon  Injectable 1 milliGRAM(s) IntraMuscular once PRN Glucose LESS THAN 70 milligrams/deciliter  ketorolac   Injectable 15 milliGRAM(s) IV Push every 6 hours PRN Moderate Pain (4 - 6)

## 2019-07-03 NOTE — PROGRESS NOTE ADULT - SUBJECTIVE AND OBJECTIVE BOX
OPERATIVE PROCEDURE(s):                POD #                       87yMale  SURGEON(s): LEE Edmondson  SUBJECTIVE ASSESSMENT:   Vital Signs Last 24 Hrs  T(F): 97.2 (02 Jul 2019 20:00), Max: 98.5 (02 Jul 2019 08:15)  HR: 92 (03 Jul 2019 06:00) (78 - 99)  BP: 141/81 (03 Jul 2019 06:00) (99/57 - 141/81)  BP(mean): 103 (03 Jul 2019 06:00) (75 - 105)  ABP: --  ABP(mean): --  RR: 19 (02 Jul 2019 18:00) (17 - 22)  SpO2: 94% (03 Jul 2019 06:00) (93% - 99%)  CVP(mm Hg): --  CVP(cm H2O): --  CO: --  CI: --  PA: --  SVR: --    I&O's Detail    02 Jul 2019 07:01  -  03 Jul 2019 07:00  --------------------------------------------------------  IN:    IV PiggyBack: 100 mL    Oral Fluid: 950 mL  Total IN: 1050 mL    OUT:    Voided: 1650 mL  Total OUT: 1650 mL        Net:   I&O's Detail    01 Jul 2019 07:01  -  02 Jul 2019 07:00  --------------------------------------------------------  Total NET: -725 mL      02 Jul 2019 07:01  -  03 Jul 2019 07:00  --------------------------------------------------------  Total NET: -600 mL        CAPILLARY BLOOD GLUCOSE  140 (02 Jul 2019 10:00)      POCT Blood Glucose.: 144 mg/dL (03 Jul 2019 07:20)  POCT Blood Glucose.: 140 mg/dL (02 Jul 2019 16:00)  POCT Blood Glucose.: 142 mg/dL (02 Jul 2019 11:06)  POCT Blood Glucose.: 140 mg/dL (02 Jul 2019 10:03)    Physical Exam:  General: NAD; A&Ox3/Patient is intubated and sedated  Cardiac: S1/S2, RRR, no murmur, no rubs  Lungs: unlabored respirations, CTA b/l, no wheeze, no rales, no crackles  Abdomen: Soft/NT/ND; positive bowel sounds x 4  Sternum: Intact, no click, incision healing well with no drainage  Incisions: Incisions clean/dry/intact  Extremities: No edema b/l lower extremities; good capillary refill; no cyanosis; palpable 1+ pedal pulses b/l    Central Venous Catheter: Yes[]  No[] , If Yes indication:           Day #  Silva Catheter: Yes  [] , No  [] , If yes indication:                      Day #  NGT: Yes [] No [] ,    If Yes Placement:                                     Day #  EPICARDIAL WIRES:  [] YES [] NO                                              Day #  BOWEL MOVEMENT:  [] YES [] NO, If No, Timing since last BM:      Day #  CHEST TUBE(Left/Right):  [] YES [] NO, If yes -  AIR LEAKS:  [] YES [] NO        LABS:                        9.5<L>  6.95  )-----------( 132      ( 03 Jul 2019 02:00 )             28.9<L>                        9.5<L>  7.70  )-----------( 104<L>    ( 02 Jul 2019 01:20 )             29.0<L>    07-03    136  |  98  |  20  ----------------------------<  122<H>  3.8   |  29  |  1.0  07-02    136  |  100  |  21<H>  ----------------------------<  119<H>  4.0   |  28  |  0.9    Ca    7.8<L>      03 Jul 2019 02:00  Mg     2.6     07-03    TPro  5.2<L> [6.0 - 8.0]  /  Alb  3.0<L> [3.5 - 5.2]  /  TBili  0.6 [0.2 - 1.2]  /  DBili  0.2 [0.0 - 0.2]  /  AST  46<H> [0 - 41]  /  ALT  42<H> [0 - 41]  /  AlkPhos  87 [30 - 115]  07-01          RADIOLOGY & ADDITIONAL TESTS:  CXR:  EKG:  MEDICATIONS  (STANDING):  ALBUTerol/ipratropium for Nebulization 3 milliLiter(s) Nebulizer every 6 hours  amiodarone    Tablet 400 milliGRAM(s) Oral every 8 hours  aspirin enteric coated 81 milliGRAM(s) Oral daily  atorvastatin 20 milliGRAM(s) Oral at bedtime  chlorhexidine 4% Liquid 1 Application(s) Topical <User Schedule>  dextrose 5%. 1000 milliLiter(s) (50 mL/Hr) IV Continuous <Continuous>  dextrose 50% Injectable 12.5 Gram(s) IV Push once  dextrose 50% Injectable 25 Gram(s) IV Push once  docusate sodium 100 milliGRAM(s) Oral three times a day  famotidine    Tablet 20 milliGRAM(s) Oral daily  furosemide   Injectable 40 milliGRAM(s) IV Push daily  guaiFENesin  milliGRAM(s) Oral every 12 hours  heparin  Injectable 5000 Unit(s) SubCutaneous every 8 hours  insulin lispro (HumaLOG) corrective regimen sliding scale   SubCutaneous three times a day before meals  levothyroxine 50 MICROGram(s) Oral daily  magnesium sulfate  IVPB 1 Gram(s) IV Intermittent every 12 hours  metoprolol tartrate 50 milliGRAM(s) Oral two times a day  potassium chloride    Tablet ER 20 milliEquivalent(s) Oral once  senna 2 Tablet(s) Oral at bedtime  sodium chloride 0.9%. 1000 milliLiter(s) (20 mL/Hr) IV Continuous <Continuous>    MEDICATIONS  (PRN):  glucagon  Injectable 1 milliGRAM(s) IntraMuscular once PRN Glucose LESS THAN 70 milligrams/deciliter  ketorolac   Injectable 15 milliGRAM(s) IV Push every 6 hours PRN Moderate Pain (4 - 6)    HEPARIN:  [] YES [] NO  Dose: XX UNITS/HR UNITS Q8H  LOVENOX:[] YES [] NO  Dose: XX mg Q24H  COUMADIN: []  YES [] NO  Dose: XX mg  Q24H  SCD's: YES b/l  GI Prophylaxis: Protonix [], Pepcid [], None [], (Contra-indication:.....)    Post-Op Beta-Blockers: Yes [], No[], If No, then contraindication:  Post-Op Aspirin: Yes [],  No [], If No, then contraindication:  Post-Op Statin: Yes [], No[], If No, then contraindication:  Allergies    No Known Allergies    Intolerances      Ambulation/Activity Status:    Assessment/Plan:  87y Male status-post .....  - Case and plan discussed with CTU Intensivist and CT Surgeon - Dr. Anderson/Casandra/Debo   - Continue CTU supportive care    - Continue DVT/GI prophylaxis  - Incentive Spirometry 10 times an hour  - Continue to advance physical activity as tolerated and continue PT/OT as directed  1. CAD: Continue ASA, statin, BB  2. HTN:   3. A. Fib:   4. COPD/Hypoxia:   5. DM/Glucose Control:     Social Service Disposition: OPERATIVE PROCEDURE(s): CABgx4               POD #  5                     87yMale  SURGEON(s): LEE Edmondson  SUBJECTIVE ASSESSMENT: pt seen and examined. pt feels nauseous this morning  Vital Signs Last 24 Hrs  T(F): 97.2 (02 Jul 2019 20:00), Max: 98.5 (02 Jul 2019 08:15)  HR: 92 (03 Jul 2019 06:00) (78 - 99)  BP: 141/81 (03 Jul 2019 06:00) (99/57 - 141/81)  BP(mean): 103 (03 Jul 2019 06:00) (75 - 105)  RR: 19 (02 Jul 2019 18:00) (17 - 22)  SpO2: 94% (03 Jul 2019 06:00) (93% - 99%)      I&O's Detail    02 Jul 2019 07:01  -  03 Jul 2019 07:00  --------------------------------------------------------  IN:    IV PiggyBack: 100 mL    Oral Fluid: 950 mL  Total IN: 1050 mL    OUT:    Voided: 1650 mL  Total OUT: 1650 mL        Net:   I&O's Detail    01 Jul 2019 07:01  -  02 Jul 2019 07:00  --------------------------------------------------------  Total NET: -725 mL      02 Jul 2019 07:01  -  03 Jul 2019 07:00  --------------------------------------------------------  Total NET: -600 mL        CAPILLARY BLOOD GLUCOSE  140 (02 Jul 2019 10:00)      POCT Blood Glucose.: 144 mg/dL (03 Jul 2019 07:20)  POCT Blood Glucose.: 140 mg/dL (02 Jul 2019 16:00)  POCT Blood Glucose.: 142 mg/dL (02 Jul 2019 11:06)  POCT Blood Glucose.: 140 mg/dL (02 Jul 2019 10:03)    Physical Exam:  General: NAD; A&Ox3  Cardiac: S1/S2, RRR, no murmur, no rubs  Lungs: decreased bs at bases bilaterally  Abdomen: Soft/NT/ND; positive bowel sounds x 4  Sternum: Intact, no click, incision healing well with no drainage  Incisions: Incisions clean/dry/intact  Extremities: trace edema b/l lower extremities; good capillary refill; no cyanosis; palpable 1+ pedal pulses b/l    Central Venous Catheter: Yes[]  No[x] , If Yes indication:           Day #  Silva Catheter: Yes  [] , No  [x] , If yes indication:                      Day #  NGT: Yes [] No [x] ,    If Yes Placement:                                     Day #  EPICARDIAL WIRES:  [x] YES [] NO                                              Day #5  BOWEL MOVEMENT:  [x] YES [] NO, If No, Timing since last BM:      Day #  CHEST TUBE(Left/Right):  [] YES [x] NO, If yes -  AIR LEAKS:  [] YES [] NO        LABS:                        9.5<L>  6.95  )-----------( 132      ( 03 Jul 2019 02:00 )             28.9<L>                        9.5<L>  7.70  )-----------( 104<L>    ( 02 Jul 2019 01:20 )             29.0<L>    07-03    136  |  98  |  20  ----------------------------<  122<H>  3.8   |  29  |  1.0  07-02    136  |  100  |  21<H>  ----------------------------<  119<H>  4.0   |  28  |  0.9    Ca    7.8<L>      03 Jul 2019 02:00  Mg     2.6     07-03    TPro  5.2<L> [6.0 - 8.0]  /  Alb  3.0<L> [3.5 - 5.2]  /  TBili  0.6 [0.2 - 1.2]  /  DBili  0.2 [0.0 - 0.2]  /  AST  46<H> [0 - 41]  /  ALT  42<H> [0 - 41]  /  AlkPhos  87 [30 - 115]  07-01          RADIOLOGY & ADDITIONAL TESTS:  CXR: < from: Xray Chest 1 View- PORTABLE-Routine (07.03.19 @ 06:11) >  Impression:      Stable interstitial edema and small bibasilar opacities/pleural effusions.    < end of copied text >    EKG:  MEDICATIONS  (STANDING):  ALBUTerol/ipratropium for Nebulization 3 milliLiter(s) Nebulizer every 6 hours  amiodarone    Tablet 400 milliGRAM(s) Oral every 8 hours  aspirin enteric coated 81 milliGRAM(s) Oral daily  atorvastatin 20 milliGRAM(s) Oral at bedtime  chlorhexidine 4% Liquid 1 Application(s) Topical <User Schedule>  dextrose 5%. 1000 milliLiter(s) (50 mL/Hr) IV Continuous <Continuous>  dextrose 50% Injectable 12.5 Gram(s) IV Push once  dextrose 50% Injectable 25 Gram(s) IV Push once  docusate sodium 100 milliGRAM(s) Oral three times a day  famotidine    Tablet 20 milliGRAM(s) Oral daily  furosemide   Injectable 40 milliGRAM(s) IV Push daily  guaiFENesin  milliGRAM(s) Oral every 12 hours  heparin  Injectable 5000 Unit(s) SubCutaneous every 8 hours  insulin lispro (HumaLOG) corrective regimen sliding scale   SubCutaneous three times a day before meals  levothyroxine 50 MICROGram(s) Oral daily  magnesium sulfate  IVPB 1 Gram(s) IV Intermittent every 12 hours  metoprolol tartrate 50 milliGRAM(s) Oral two times a day  potassium chloride    Tablet ER 20 milliEquivalent(s) Oral once  senna 2 Tablet(s) Oral at bedtime  sodium chloride 0.9%. 1000 milliLiter(s) (20 mL/Hr) IV Continuous <Continuous>    MEDICATIONS  (PRN):  glucagon  Injectable 1 milliGRAM(s) IntraMuscular once PRN Glucose LESS THAN 70 milligrams/deciliter  ketorolac   Injectable 15 milliGRAM(s) IV Push every 6 hours PRN Moderate Pain (4 - 6)    HEPARIN:  [x] YES [] NO  Dose: 5000 UNITS Q8H  LOVENOX:[] YES [x] NO  Dose: XX mg Q24H  COUMADIN: []  YES [x] NO  Dose: XX mg  Q24H  SCD's: YES b/l  GI Prophylaxis: Protonix [], Pepcid [x], None [], (Contra-indication:.....)    Post-Op Beta-Blockers: Yes [x], No[], If No, then contraindication:  Post-Op Aspirin: Yes [x],  No [], If No, then contraindication:  Post-Op Statin: Yes [x], No[], If No, then contraindication:  Allergies    No Known Allergies    Intolerances      Ambulation/Activity Status: ambulate     Assessment/Plan:  87 year-old male s/p CABGx4 POD#5  -case discussed with Dr. Edmondson  - Continue DVT/GI prophylaxis   - Incentive Spirometry 10 times an hour  - Continue to advance physical activity as tolerated and continue PT/OT as directed  1. CAD: Continue ASA,  BB, statin  2. HTN: cont lopressor  3. A. Fib: decrease amio 200mg daily, cont metoprolol, no coumadin being pt is a fall risk, yesterday pt felt weak and near syncopal episode while walking with PCA  4. COPD/Hypoxia: cont nebs, mucinex, wean o2 as tolerated, lasix for fluid overload  5. DM/Glucose Control: insulin sliding scale    Social Service Disposition:  rehab

## 2019-07-03 NOTE — PROGRESS NOTE ADULT - SUBJECTIVE AND OBJECTIVE BOX
CTU Attending Progress Daily Note     03 Jul 2019 10:55  POD# - 5      He has history of Prostate cancer  Old myocardial infarction  Hypothyroid  Essential hypertension    Interval event for past 24 hr:  LILIA SIERRA  87y     yestrday his legs gave up on him did not hit floor was caught and eased down to the floor   no pains he never Hit his head or knees etc "   Bp meds reduced adjusted  Current Complains:  LILIA SIERRA has no new complains  HPI:  87 yrs old with Hx of CAD ( MI 30ys ago) presented for elective LHC.  pt was having chest pain on minimal exertion . today pt had an episode of chest pain with ECG revealing ST depressions.  currently pain free. on nitro drip. (27 Jun 2019 07:23)    OBJECTIVE:  ICU Vital Signs Last 24 Hrs  T(C): 37.1 (03 Jul 2019 08:00), Max: 37.1 (03 Jul 2019 08:00)  T(F): 98.7 (03 Jul 2019 08:00), Max: 98.7 (03 Jul 2019 08:00)  HR: 85 (03 Jul 2019 10:00) (78 - 101)  BP: 113/69 (03 Jul 2019 08:42) (99/57 - 141/81)  BP(mean): 86 (03 Jul 2019 08:42) (75 - 103)  ABP: --  ABP(mean): --  RR: 16 (03 Jul 2019 10:00) (16 - 20)  SpO2: 97% (03 Jul 2019 10:00) (94% - 99%)    I&O's Summary    02 Jul 2019 07:01  -  03 Jul 2019 07:00  --------------------------------------------------------  IN: 1050 mL / OUT: 1650 mL / NET: -600 mL    03 Jul 2019 07:01  -  03 Jul 2019 10:55  --------------------------------------------------------  IN: 125 mL / OUT: 350 mL / NET: -225 mL      I&O's Detail    02 Jul 2019 07:01  -  03 Jul 2019 07:00  --------------------------------------------------------  IN:    IV PiggyBack: 100 mL    Oral Fluid: 950 mL  Total IN: 1050 mL    OUT:    Voided: 1650 mL  Total OUT: 1650 mL    Total NET: -600 mL      03 Jul 2019 07:01  -  03 Jul 2019 10:55  --------------------------------------------------------  IN:    Oral Fluid: 125 mL  Total IN: 125 mL    OUT:    Voided: 350 mL  Total OUT: 350 mL    Total NET: -225 mL      CAPILLARY BLOOD GLUCOSE      POCT Blood Glucose.: 144 mg/dL (03 Jul 2019 07:20)  POCT Blood Glucose.: 140 mg/dL (02 Jul 2019 16:00)  POCT Blood Glucose.: 142 mg/dL (02 Jul 2019 11:06)    LABS:                          9.5    6.95  )-----------( 132      ( 03 Jul 2019 02:00 )             28.9     07-03    136  |  98  |  20  ----------------------------<  122<H>  3.8   |  29  |  1.0    Ca    7.8<L>      03 Jul 2019 02:00  Mg     2.6     07-03            Home Medications:  aspirin 81 mg oral tablet: 1 tab(s) orally once a day (27 Jun 2019 07:30)  atorvastatin 20 mg oral tablet: 1 tab(s) orally once a day (27 Jun 2019 07:30)  isosorbide mononitrate 30 mg oral tablet, extended release: 1 tab(s) orally once a day (in the morning) (27 Jun 2019 07:30)  nitroglycerin 0.4 mg sublingual tablet: pt took in cath lab bathroom in cath lab (27 Jun 2019 07:30)  propranolol 60 mg oral capsule, extended release: 1 cap(s) orally once a day (27 Jun 2019 07:30)  Synthroid 50 mcg (0.05 mg) oral tablet: 1 tab(s) orally once a day (27 Jun 2019 07:30)    HOSPITAL MEDICATIONS:  MEDICATIONS  (STANDING):  ALBUTerol/ipratropium for Nebulization 3 milliLiter(s) Nebulizer every 6 hours  amiodarone    Tablet 200 milliGRAM(s) Oral every 12 hours  aspirin enteric coated 325 milliGRAM(s) Oral daily  atorvastatin 20 milliGRAM(s) Oral at bedtime  bisacodyl Suppository 10 milliGRAM(s) Rectal once  chlorhexidine 4% Liquid 1 Application(s) Topical <User Schedule>  dextrose 5%. 1000 milliLiter(s) (50 mL/Hr) IV Continuous <Continuous>  dextrose 50% Injectable 12.5 Gram(s) IV Push once  dextrose 50% Injectable 25 Gram(s) IV Push once  docusate sodium 100 milliGRAM(s) Oral three times a day  famotidine    Tablet 20 milliGRAM(s) Oral daily  furosemide   Injectable 40 milliGRAM(s) IV Push daily  guaiFENesin  milliGRAM(s) Oral every 12 hours  heparin  Injectable 5000 Unit(s) SubCutaneous every 8 hours  insulin lispro (HumaLOG) corrective regimen sliding scale   SubCutaneous three times a day before meals  levothyroxine 50 MICROGram(s) Oral daily  magnesium sulfate  IVPB 1 Gram(s) IV Intermittent every 12 hours  metoprolol tartrate 50 milliGRAM(s) Oral two times a day  senna 2 Tablet(s) Oral at bedtime  sodium chloride 0.9%. 1000 milliLiter(s) (20 mL/Hr) IV Continuous <Continuous>    MEDICATIONS  (PRN):  glucagon  Injectable 1 milliGRAM(s) IntraMuscular once PRN Glucose LESS THAN 70 milligrams/deciliter  ketorolac   Injectable 15 milliGRAM(s) IV Push every 6 hours PRN Moderate Pain (4 - 6)      REVIEW OF SYSTEMS:  CONSTITUTIONAL: [X] all negative; [ ] weakness, [ ] fevers, [ ] chills  EYES/ENT: [X] all negative; [ ] visual changes, [ ] vertigo, [ ] throat pain   NECK: [X] all negative; [ ] pain, [ ] stiffness  RESPIRATORY: [] all negative, [ ] cough, [ ] wheezing, [ ] hemoptysis, [ ] shortness of breath  CARDIOVASCULAR: [] all negative; [ ] chest pain, [ ] palpitations, [ ] orthopnea  GASTROINTESTINAL: [X] all negative; [ ]abdominal pain, [ ] nausea, [ ] vomiting, [ ] hematemesis, [ ] diarrhea, [ ] constipation, [ ] melena, [ ] hematochezia.  GENITOURINARY: [X] all negative; [ ] dysuria, [ ] frequency, [ ] hematuria  NEUROLOGICAL: [X] all negative; [ ] numbness, [ ] weakness  SKIN: [X] all negative; [ ] itching, [ ] burning, [ ] rashes, [ ] lesions   All other review of systems is negative unless indicated above.    [  ] Unable to assess ROS because     PHYSICAL EXAM:          CONSTITUTIONAL: Well-developed; well-nourished; in no acute distress.   	SKIN: warm, dry  	HEAD: Normocephalic; atraumatic.  	EYES: PERRL, EOM, no conj injection, sclera clear  	ENT: No nasal discharge; airway clear.  	NECK: Supple; non tender.  No midline ttp ctls  	CARD: S1, S2 normal; no murmurs, gallops, or rubs. Regular rate and rhythm. 2+ RPs and DPs bilat, no carotid bruits, no pedal   edema, no calf pain b/l  	RESP: CTA  bilat good air movement No wheezes, rales or rhonchi.  	ABD: Soft, not tender, not distended, no CVA ttp no rebound or guarding, bowel sounds present  	EXT: Normal ROM.  No clubbing, cyanosis or edema.   	  	NEURO: Alert, awake, motor 5/5 R, 5/5 L        RADIOLOGY:  xray    < from: Xray Chest 1 View- PORTABLE-Routine (07.03.19 @ 06:11) >  Impression:      Stable interstitial edema and small bibasilar opacities/pleural effusions.    < end of copied text >

## 2019-07-03 NOTE — PROGRESS NOTE ADULT - ATTENDING COMMENTS
POD 5 after C4IMA  doing well  afib, on amio  pt has a near fall yesterday, coumadin stopped  cont ASA, bb, bid amio, SQH  cont diuresis (negative 300cc/24hrs)  R pleural effusion slightly better, cont daily CXR  no pulm distress  Cont pulmonary toilet, Chest PT, pain control, Incentive spirometry  OOB ambulate  dispo planning  case d/w CTU team

## 2019-07-04 LAB
ANION GAP SERPL CALC-SCNC: 10 MMOL/L — SIGNIFICANT CHANGE UP (ref 7–14)
BASOPHILS # BLD AUTO: 0.03 K/UL — SIGNIFICANT CHANGE UP (ref 0–0.2)
BASOPHILS NFR BLD AUTO: 0.4 % — SIGNIFICANT CHANGE UP (ref 0–1)
BUN SERPL-MCNC: 21 MG/DL — HIGH (ref 10–20)
CALCIUM SERPL-MCNC: 8.1 MG/DL — LOW (ref 8.5–10.1)
CHLORIDE SERPL-SCNC: 99 MMOL/L — SIGNIFICANT CHANGE UP (ref 98–110)
CO2 SERPL-SCNC: 30 MMOL/L — SIGNIFICANT CHANGE UP (ref 17–32)
CREAT SERPL-MCNC: 1.1 MG/DL — SIGNIFICANT CHANGE UP (ref 0.7–1.5)
EOSINOPHIL # BLD AUTO: 0.07 K/UL — SIGNIFICANT CHANGE UP (ref 0–0.7)
EOSINOPHIL NFR BLD AUTO: 1 % — SIGNIFICANT CHANGE UP (ref 0–8)
GLUCOSE BLDC GLUCOMTR-MCNC: 134 MG/DL — HIGH (ref 70–99)
GLUCOSE SERPL-MCNC: 125 MG/DL — HIGH (ref 70–99)
HCT VFR BLD CALC: 28.9 % — LOW (ref 42–52)
HGB BLD-MCNC: 9.5 G/DL — LOW (ref 14–18)
IMM GRANULOCYTES NFR BLD AUTO: 1.2 % — HIGH (ref 0.1–0.3)
LYMPHOCYTES # BLD AUTO: 0.88 K/UL — LOW (ref 1.2–3.4)
LYMPHOCYTES # BLD AUTO: 12.7 % — LOW (ref 20.5–51.1)
MAGNESIUM SERPL-MCNC: 2.5 MG/DL — HIGH (ref 1.8–2.4)
MCHC RBC-ENTMCNC: 29.4 PG — SIGNIFICANT CHANGE UP (ref 27–31)
MCHC RBC-ENTMCNC: 32.9 G/DL — SIGNIFICANT CHANGE UP (ref 32–37)
MCV RBC AUTO: 89.5 FL — SIGNIFICANT CHANGE UP (ref 80–94)
MONOCYTES # BLD AUTO: 0.66 K/UL — HIGH (ref 0.1–0.6)
MONOCYTES NFR BLD AUTO: 9.5 % — HIGH (ref 1.7–9.3)
NEUTROPHILS # BLD AUTO: 5.23 K/UL — SIGNIFICANT CHANGE UP (ref 1.4–6.5)
NEUTROPHILS NFR BLD AUTO: 75.2 % — SIGNIFICANT CHANGE UP (ref 42.2–75.2)
NRBC # BLD: 0 /100 WBCS — SIGNIFICANT CHANGE UP (ref 0–0)
PLATELET # BLD AUTO: 144 K/UL — SIGNIFICANT CHANGE UP (ref 130–400)
POTASSIUM SERPL-MCNC: 4.4 MMOL/L — SIGNIFICANT CHANGE UP (ref 3.5–5)
POTASSIUM SERPL-SCNC: 4.4 MMOL/L — SIGNIFICANT CHANGE UP (ref 3.5–5)
RBC # BLD: 3.23 M/UL — LOW (ref 4.7–6.1)
RBC # FLD: 14.6 % — HIGH (ref 11.5–14.5)
SODIUM SERPL-SCNC: 139 MMOL/L — SIGNIFICANT CHANGE UP (ref 135–146)
WBC # BLD: 6.95 K/UL — SIGNIFICANT CHANGE UP (ref 4.8–10.8)
WBC # FLD AUTO: 6.95 K/UL — SIGNIFICANT CHANGE UP (ref 4.8–10.8)

## 2019-07-04 PROCEDURE — 93010 ELECTROCARDIOGRAM REPORT: CPT

## 2019-07-04 PROCEDURE — 71045 X-RAY EXAM CHEST 1 VIEW: CPT | Mod: 26

## 2019-07-04 RX ADMIN — Medication 100 GRAM(S): at 17:07

## 2019-07-04 RX ADMIN — Medication 40 MILLIGRAM(S): at 05:55

## 2019-07-04 RX ADMIN — HEPARIN SODIUM 5000 UNIT(S): 5000 INJECTION INTRAVENOUS; SUBCUTANEOUS at 21:37

## 2019-07-04 RX ADMIN — ATORVASTATIN CALCIUM 20 MILLIGRAM(S): 80 TABLET, FILM COATED ORAL at 21:38

## 2019-07-04 RX ADMIN — HEPARIN SODIUM 5000 UNIT(S): 5000 INJECTION INTRAVENOUS; SUBCUTANEOUS at 13:00

## 2019-07-04 RX ADMIN — CHLORHEXIDINE GLUCONATE 15 MILLILITER(S): 213 SOLUTION TOPICAL at 05:31

## 2019-07-04 RX ADMIN — Medication 600 MILLIGRAM(S): at 17:08

## 2019-07-04 RX ADMIN — FAMOTIDINE 20 MILLIGRAM(S): 10 INJECTION INTRAVENOUS at 12:56

## 2019-07-04 RX ADMIN — HEPARIN SODIUM 5000 UNIT(S): 5000 INJECTION INTRAVENOUS; SUBCUTANEOUS at 05:55

## 2019-07-04 RX ADMIN — Medication 50 MICROGRAM(S): at 05:54

## 2019-07-04 RX ADMIN — AMIODARONE HYDROCHLORIDE 200 MILLIGRAM(S): 400 TABLET ORAL at 05:54

## 2019-07-04 RX ADMIN — AMIODARONE HYDROCHLORIDE 200 MILLIGRAM(S): 400 TABLET ORAL at 17:08

## 2019-07-04 RX ADMIN — Medication 50 MILLIGRAM(S): at 17:08

## 2019-07-04 RX ADMIN — Medication 325 MILLIGRAM(S): at 12:56

## 2019-07-04 RX ADMIN — Medication 100 GRAM(S): at 05:55

## 2019-07-04 RX ADMIN — CHLORHEXIDINE GLUCONATE 1 APPLICATION(S): 213 SOLUTION TOPICAL at 20:00

## 2019-07-04 RX ADMIN — Medication 600 MILLIGRAM(S): at 05:55

## 2019-07-04 RX ADMIN — Medication 50 MILLIGRAM(S): at 05:54

## 2019-07-04 NOTE — DIETITIAN INITIAL EVALUATION ADULT. - OTHER INFO
P/w: chest pain. S/p CABG. POD #5. Pain control. Insulin sliding scale for glucose control. Fluid overload on Lasix daily and K supplement.

## 2019-07-04 NOTE — DIETITIAN INITIAL EVALUATION ADULT. - DIET TYPE
Per pt. regular diet PTP, regular meals and snacks, no supplement use. NKFA. Stable weight trends. Continue consistent carb w/snack, DASh/TLC diet order

## 2019-07-04 NOTE — PROGRESS NOTE ADULT - SUBJECTIVE AND OBJECTIVE BOX
OPERATIVE PROCEDURE(s):     cabg x 4           POD # 6    SURGEON(s): selina     SUBJECTIVE ASSESSMENT: pt is without complaints and feels better after he was disimpacted yesterday. pt wants to go to rehab on discharge being he lives alone with his elderly wife    Vital Signs Last 24 Hrs  T(C): 36.6 (04 Jul 2019 08:00), Max: 37.1 (03 Jul 2019 16:00)  T(F): 97.8 (04 Jul 2019 08:00), Max: 98.7 (03 Jul 2019 16:00)  HR: 96 (04 Jul 2019 10:30) (68 - 108)  BP: 134/55 (04 Jul 2019 10:30) (98/56 - 134/55)  BP(mean): 83 (04 Jul 2019 10:30) (72 - 96)  RR: 20 (04 Jul 2019 10:30) (19 - 23)  SpO2: 99% (04 Jul 2019 10:30) (95% - 100%)  07-03-19 @ 07:01  -  07-04-19 @ 07:00  --------------------------------------------------------  IN: 885 mL / OUT: 1000 mL / NET: -115 mL    07-04-19 @ 07:01  -  07-04-19 @ 11:36  --------------------------------------------------------  IN: 120 mL / OUT: 450 mL / NET: -330 mL        Physical Exam  General: alert and oriented x 3  Chest: cta bl  CVS: s1s2  Abd: pos bs, soft nt  GI/ :  Ext: no sig edema  incisions- c/d/i  sternum intact    Central Venous Catheter: Yes[x ]  No[ ] , If Yes indication:           Day #    Silva Catheter: Yes  [ ] , No [x ] : If yes indication:                         Day #    NGT: Yes [ ] No [x     If Yes Placement:                                          Day #    Post-Op Beta-Blockers: Yes [ x], No[ ], If No, then contraindication:    CHEST TUBE:  [ ] YES [xNO  OUTPUT:     per 24 hours    AIR LEAKS:  [ ] YES [ ] NO      BOWEL MOVEMENT:  [x ] YES [ ] NO          LABS:                        9.5    6.95  )-----------( 144      ( 04 Jul 2019 01:00 )             28.9     COUMADIN:   [ ] YES [ x] NO      07-04    139  |  99  |  21<H>  ----------------------------<  125<H>  4.4   |  30  |  1.1    Ca    8.1<L>      04 Jul 2019 01:00  Mg     2.5     07-04    MEDICATIONS  (STANDING):  ALBUTerol/ipratropium for Nebulization 3 milliLiter(s) Nebulizer every 6 hours  amiodarone    Tablet 200 milliGRAM(s) Oral every 12 hours  aspirin enteric coated 325 milliGRAM(s) Oral daily  atorvastatin 20 milliGRAM(s) Oral at bedtime  chlorhexidine 4% Liquid 1 Application(s) Topical <User Schedule>  famotidine    Tablet 20 milliGRAM(s) Oral daily  furosemide   Injectable 40 milliGRAM(s) IV Push daily  guaiFENesin  milliGRAM(s) Oral every 12 hours  heparin  Injectable 5000 Unit(s) SubCutaneous every 8 hours  levothyroxine 50 MICROGram(s) Oral daily  magnesium sulfate  IVPB 1 Gram(s) IV Intermittent every 12 hours  metoprolol tartrate 50 milliGRAM(s) Oral two times a day  senna 2 Tablet(s) Oral at bedtime    MEDICATIONS  (PRN):  glucagon  Injectable 1 milliGRAM(s) IntraMuscular once PRN Glucose LESS THAN 70 milligrams/deciliter  ketorolac   Injectable 15 milliGRAM(s) IV Push every 6 hours PRN Moderate Pain (4 - 6)      Allergies    No Known Allergies    Intolerances        Ambulation/Activity Status:  ambulated well with pt      RADIOLOGY & ADDITIONAL TESTS:  Xray Chest 1 View- PORTABLE-Routine: AM   Indication: Shortness of Breath  Transport: Portable,  w/ Monitor  Provider's Contact #: [phone] (07-04-19 @ 09:06)  EXAM:  XR CHEST PORTABLE ROUTINE 1V            PROCEDURE DATE:  07/04/2019      INTERPRETATION:  Clinical History / Reason for exam: Coronary artery   disease.    Comparison : Chest radiograph prior day.    Technique/Positioning: Frontal portable, low lung volumes.    Findings:    Support devices: Telemetry leads overlie the thorax.    Cardiac/mediastinum/hilum: Heart is enlarged. The patient is status post   median sternotomy.    Lung parenchyma/Pleura: Improved right opacity. Unchanged left   opacity/effusion.    Skeleton/soft tissues: Unchanged    Impression:    Improved right opacity and unchanged left opacity/effusion.    Assessment/Plan: Patient is a 88 yo male s/p cabg x 4 pod # 6  cont present tx as per  CT surgeon  s/p cabg - cont asa, statin, and lopressor  dvt/gi ppx  hypothyroidism- cont synthroid  d/c planning to snf  post op a. fib- cont amio for rate control however no anti-coag being the patient is at too high of a risk for fall    Social Service Disposition:  possible d/c to [facility] tomorrow

## 2019-07-04 NOTE — DIETITIAN INITIAL EVALUATION ADULT. - FACTORS AFF FOOD INTAKE
none/had diarrhea yesterday after enema, bow resolved per pt. denies any GI issues or chew/swallow difficulty at this time

## 2019-07-05 ENCOUNTER — TRANSCRIPTION ENCOUNTER (OUTPATIENT)
Age: 84
End: 2019-07-05

## 2019-07-05 VITALS
SYSTOLIC BLOOD PRESSURE: 100 MMHG | HEART RATE: 83 BPM | OXYGEN SATURATION: 98 % | RESPIRATION RATE: 16 BRPM | DIASTOLIC BLOOD PRESSURE: 52 MMHG

## 2019-07-05 LAB
ALBUMIN SERPL ELPH-MCNC: 2.9 G/DL — LOW (ref 3.5–5.2)
ALP SERPL-CCNC: 77 U/L — SIGNIFICANT CHANGE UP (ref 30–115)
ALT FLD-CCNC: 31 U/L — SIGNIFICANT CHANGE UP (ref 0–41)
ANION GAP SERPL CALC-SCNC: 10 MMOL/L — SIGNIFICANT CHANGE UP (ref 7–14)
AST SERPL-CCNC: 33 U/L — SIGNIFICANT CHANGE UP (ref 0–41)
BASOPHILS # BLD AUTO: 0.03 K/UL — SIGNIFICANT CHANGE UP (ref 0–0.2)
BASOPHILS NFR BLD AUTO: 0.4 % — SIGNIFICANT CHANGE UP (ref 0–1)
BILIRUB DIRECT SERPL-MCNC: 0.2 MG/DL — SIGNIFICANT CHANGE UP (ref 0–0.2)
BILIRUB INDIRECT FLD-MCNC: 0.4 MG/DL — SIGNIFICANT CHANGE UP (ref 0.2–1.2)
BILIRUB SERPL-MCNC: 0.6 MG/DL — SIGNIFICANT CHANGE UP (ref 0.2–1.2)
BUN SERPL-MCNC: 22 MG/DL — HIGH (ref 10–20)
CALCIUM SERPL-MCNC: 7.9 MG/DL — LOW (ref 8.5–10.1)
CHLORIDE SERPL-SCNC: 98 MMOL/L — SIGNIFICANT CHANGE UP (ref 98–110)
CO2 SERPL-SCNC: 30 MMOL/L — SIGNIFICANT CHANGE UP (ref 17–32)
CREAT SERPL-MCNC: 1.1 MG/DL — SIGNIFICANT CHANGE UP (ref 0.7–1.5)
EOSINOPHIL # BLD AUTO: 0.17 K/UL — SIGNIFICANT CHANGE UP (ref 0–0.7)
EOSINOPHIL NFR BLD AUTO: 2.2 % — SIGNIFICANT CHANGE UP (ref 0–8)
GLUCOSE BLDC GLUCOMTR-MCNC: 167 MG/DL — HIGH (ref 70–99)
GLUCOSE SERPL-MCNC: 111 MG/DL — HIGH (ref 70–99)
HCT VFR BLD CALC: 28.7 % — LOW (ref 42–52)
HGB BLD-MCNC: 9.2 G/DL — LOW (ref 14–18)
IMM GRANULOCYTES NFR BLD AUTO: 0.9 % — HIGH (ref 0.1–0.3)
LYMPHOCYTES # BLD AUTO: 1.01 K/UL — LOW (ref 1.2–3.4)
LYMPHOCYTES # BLD AUTO: 13.3 % — LOW (ref 20.5–51.1)
MAGNESIUM SERPL-MCNC: 2.6 MG/DL — HIGH (ref 1.8–2.4)
MCHC RBC-ENTMCNC: 28.8 PG — SIGNIFICANT CHANGE UP (ref 27–31)
MCHC RBC-ENTMCNC: 32.1 G/DL — SIGNIFICANT CHANGE UP (ref 32–37)
MCV RBC AUTO: 90 FL — SIGNIFICANT CHANGE UP (ref 80–94)
MONOCYTES # BLD AUTO: 0.57 K/UL — SIGNIFICANT CHANGE UP (ref 0.1–0.6)
MONOCYTES NFR BLD AUTO: 7.5 % — SIGNIFICANT CHANGE UP (ref 1.7–9.3)
NEUTROPHILS # BLD AUTO: 5.74 K/UL — SIGNIFICANT CHANGE UP (ref 1.4–6.5)
NEUTROPHILS NFR BLD AUTO: 75.7 % — HIGH (ref 42.2–75.2)
NRBC # BLD: 0 /100 WBCS — SIGNIFICANT CHANGE UP (ref 0–0)
PLATELET # BLD AUTO: 173 K/UL — SIGNIFICANT CHANGE UP (ref 130–400)
POTASSIUM SERPL-MCNC: 4.3 MMOL/L — SIGNIFICANT CHANGE UP (ref 3.5–5)
POTASSIUM SERPL-SCNC: 4.3 MMOL/L — SIGNIFICANT CHANGE UP (ref 3.5–5)
PROT SERPL-MCNC: 5.3 G/DL — LOW (ref 6–8)
RBC # BLD: 3.19 M/UL — LOW (ref 4.7–6.1)
RBC # FLD: 14.8 % — HIGH (ref 11.5–14.5)
SODIUM SERPL-SCNC: 138 MMOL/L — SIGNIFICANT CHANGE UP (ref 135–146)
WBC # BLD: 7.59 K/UL — SIGNIFICANT CHANGE UP (ref 4.8–10.8)
WBC # FLD AUTO: 7.59 K/UL — SIGNIFICANT CHANGE UP (ref 4.8–10.8)

## 2019-07-05 PROCEDURE — 99233 SBSQ HOSP IP/OBS HIGH 50: CPT

## 2019-07-05 PROCEDURE — 93010 ELECTROCARDIOGRAM REPORT: CPT

## 2019-07-05 PROCEDURE — 71045 X-RAY EXAM CHEST 1 VIEW: CPT | Mod: 26

## 2019-07-05 RX ORDER — LEVOTHYROXINE SODIUM 125 MCG
1 TABLET ORAL
Qty: 0 | Refills: 0 | DISCHARGE

## 2019-07-05 RX ORDER — LEVOTHYROXINE SODIUM 125 MCG
1 TABLET ORAL
Qty: 30 | Refills: 0
Start: 2019-07-05

## 2019-07-05 RX ORDER — PROPRANOLOL HCL 160 MG
1 CAPSULE, EXTENDED RELEASE 24HR ORAL
Qty: 0 | Refills: 0 | DISCHARGE

## 2019-07-05 RX ORDER — FUROSEMIDE 40 MG
1 TABLET ORAL
Qty: 10 | Refills: 0
Start: 2019-07-05

## 2019-07-05 RX ORDER — FAMOTIDINE 10 MG/ML
1 INJECTION INTRAVENOUS
Qty: 30 | Refills: 0
Start: 2019-07-05

## 2019-07-05 RX ORDER — METOPROLOL TARTRATE 50 MG
1 TABLET ORAL
Qty: 60 | Refills: 0
Start: 2019-07-05

## 2019-07-05 RX ORDER — ATORVASTATIN CALCIUM 80 MG/1
1 TABLET, FILM COATED ORAL
Qty: 0 | Refills: 0 | DISCHARGE

## 2019-07-05 RX ORDER — ISOSORBIDE MONONITRATE 60 MG/1
1 TABLET, EXTENDED RELEASE ORAL
Qty: 0 | Refills: 0 | DISCHARGE

## 2019-07-05 RX ORDER — ACETAMINOPHEN 500 MG
2 TABLET ORAL
Qty: 30 | Refills: 0
Start: 2019-07-05

## 2019-07-05 RX ORDER — ASPIRIN/CALCIUM CARB/MAGNESIUM 324 MG
1 TABLET ORAL
Qty: 30 | Refills: 0
Start: 2019-07-05

## 2019-07-05 RX ORDER — ASPIRIN/CALCIUM CARB/MAGNESIUM 324 MG
1 TABLET ORAL
Qty: 0 | Refills: 0 | DISCHARGE

## 2019-07-05 RX ORDER — ATORVASTATIN CALCIUM 80 MG/1
1 TABLET, FILM COATED ORAL
Qty: 30 | Refills: 0
Start: 2019-07-05

## 2019-07-05 RX ORDER — NITROGLYCERIN 6.5 MG
0 CAPSULE, EXTENDED RELEASE ORAL
Qty: 0 | Refills: 0 | DISCHARGE

## 2019-07-05 RX ORDER — AMIODARONE HYDROCHLORIDE 400 MG/1
1 TABLET ORAL
Qty: 30 | Refills: 0
Start: 2019-07-05 | End: 2019-08-03

## 2019-07-05 RX ADMIN — HEPARIN SODIUM 5000 UNIT(S): 5000 INJECTION INTRAVENOUS; SUBCUTANEOUS at 06:03

## 2019-07-05 RX ADMIN — Medication 50 MICROGRAM(S): at 06:02

## 2019-07-05 RX ADMIN — CHLORHEXIDINE GLUCONATE 15 MILLILITER(S): 213 SOLUTION TOPICAL at 06:05

## 2019-07-05 RX ADMIN — Medication 40 MILLIGRAM(S): at 06:02

## 2019-07-05 RX ADMIN — FAMOTIDINE 20 MILLIGRAM(S): 10 INJECTION INTRAVENOUS at 11:18

## 2019-07-05 RX ADMIN — AMIODARONE HYDROCHLORIDE 200 MILLIGRAM(S): 400 TABLET ORAL at 06:02

## 2019-07-05 RX ADMIN — Medication 50 MILLIGRAM(S): at 06:02

## 2019-07-05 RX ADMIN — Medication 600 MILLIGRAM(S): at 06:02

## 2019-07-05 RX ADMIN — Medication 325 MILLIGRAM(S): at 11:18

## 2019-07-05 RX ADMIN — Medication 100 GRAM(S): at 06:02

## 2019-07-05 NOTE — PROGRESS NOTE ADULT - ASSESSMENT
Assessment/Plan:  CAD-s/p CABG x 4-POD #1  1-BP control-start beta-blockers  2-serum glucose control-insulin infusion  3-acute blood loss anemia-stable, continue to monitor Hb/Hct daily  0-lipmezpwukblksoi-pmugag, continue to monitor plts daily  5-hypothyroidism-restart synthroid
a/p:  -CABG POD#3   - ASA 81 statin   Hx HTN  JYpothyroidsom  -A fib  back in afib already on amio Po and BBlockers    [id number]  amio   tid  -Insulin sliding scale for glucose control  cont with ASA, statin and Beta blocker  Trend plts, improving. start  SC heparin 5000 q12  PO synthroid  . keep in negative balance    - HTN increase lopresor to 50 q12  BBlockers continue  Amlodipin 5 mg q d    afib on B blocker and amio po  - fluid overload on lasix daily and k supplement
a/p:  -CABG POD#3   - ASA 81 statin   Hx HTN  JYpothyroidsom  -A fib Now in sinus    [id number]  amio   tid  -Insulin sliding scale for glucose control  cont with ASA, statin and Beta blocker  Trend plts, improving. start  SC heparin 5000 q12  PO synthroid  . keep in negative balance    - HTN continue BBlockers add Amlodipin 5 mg q d
a/p:  Pt still in a fib but  FALL risk as he almost fell yestrday and caught down eased to the floor  pt is high risk fall no anticoagulation    -CABG POD#5  - ASA 81 statin   Hx HTN  JYpothyroidsom  -A fib  back in afib already on amio Po and BBlockers    -will reduce   amio    BID  -Insulin sliding scale for glucose control  cont with ASA, statin and Beta blocker  Trend plts, improving. start  SC heparin 5000 q12  PO synthroid  . keep in negative balance    - HTN increase lopresor to 50 q12  BBlockers      afib on B blocker and amio po  - fluid overload on lasix daily and k supplement
a/p:  Pt still in a fib but  FALL risk as he almost fell yestrday and caught down eased to the floor  pt is high risk fall no anticoagulation    -CABG POD#7  - ASA 81 statin   Hx HTN  JYpothyroidsom  -A fib  back in afib already on amio Po and BBlockers      -will reduce   amio    once a day   -Insulin sliding scale for glucose control  cont with ASA, statin and Beta blocker  Trend plts, improving. on  SC heparin 5000 q12  PO synthroid  . keep in negative balance    Possible discharge home today no anticoagulations pt high risk for fall    - HTN increase lopresor to 50 q12  BBlockers      afib on B blocker and amio po  - fluid overload on lasix daily and k supplement

## 2019-07-05 NOTE — DISCHARGE NOTE NURSING/CASE MANAGEMENT/SOCIAL WORK - NSDCDPATPORTLINK_GEN_ALL_CORE
You can access the [facility], offered by [facility], by registering with the following website: [email or web address]

## 2019-07-05 NOTE — PROGRESS NOTE ADULT - SUBJECTIVE AND OBJECTIVE BOX
CTU Attending Progress Daily Note     05 Jul 2019 08:19  POD# -   He has history of Prostate cancer  Old myocardial infarction  Hypothyroid  Essential hypertension    Interval event for past 24 hr:  LILIA SEIRRA  87y had no event.   Current Complains:  LILIA SIERRA has no new complains  HPI:  87 yrs old with Hx of CAD ( MI 30ys ago) presented for elective LHC.  pt was having chest pain on minimal exertion . today pt had an episode of chest pain with ECG revealing ST depressions.  currently pain free. on nitro drip. (27 Jun 2019 07:23)    OBJECTIVE:  ICU Vital Signs Last 24 Hrs  T(C): 36.9 (05 Jul 2019 04:00), Max: 37.1 (05 Jul 2019 00:00)  T(F): 98.4 (05 Jul 2019 04:00), Max: 98.7 (05 Jul 2019 00:00)  HR: 80 (05 Jul 2019 07:30) (69 - 103)  BP: 106/84 (05 Jul 2019 07:30) (102/56 - 134/55)  BP(mean): 91 (05 Jul 2019 07:30) (74 - 91)  ABP: --  ABP(mean): --  RR: 22 (05 Jul 2019 07:30) (16 - 22)  SpO2: 99% (05 Jul 2019 07:30) (95% - 99%)    I&O's Summary    04 Jul 2019 07:01  -  05 Jul 2019 07:00  --------------------------------------------------------  IN: 1040 mL / OUT: 2100 mL / NET: -1060 mL      I&O's Detail    04 Jul 2019 07:01  -  05 Jul 2019 07:00  --------------------------------------------------------  IN:    IV PiggyBack: 200 mL    Oral Fluid: 840 mL  Total IN: 1040 mL    OUT:    Voided: 2100 mL  Total OUT: 2100 mL    Total NET: -1060 mL        Adult Advanced Hemodynamics Last 24 Hrs  CVP(mm Hg): --  CVP(cm H2O): --  CO: --  CI: --  PA: --  PA(mean): --  PCWP: --  SVR: --  SVRI: --  PVR: --  PVRI: --    CAPILLARY BLOOD GLUCOSE        LABS:                          9.2    7.59  )-----------( 173      ( 05 Jul 2019 02:00 )             28.7     07-05    138  |  98  |  22<H>  ----------------------------<  111<H>  4.3   |  30  |  1.1    Ca    7.9<L>      05 Jul 2019 02:00  Mg     2.6     07-05            Home Medications:  aspirin 81 mg oral tablet: 1 tab(s) orally once a day (27 Jun 2019 07:30)  atorvastatin 20 mg oral tablet: 1 tab(s) orally once a day (27 Jun 2019 07:30)  isosorbide mononitrate 30 mg oral tablet, extended release: 1 tab(s) orally once a day (in the morning) (27 Jun 2019 07:30)  nitroglycerin 0.4 mg sublingual tablet: pt took in cath lab bathroom in cath lab (27 Jun 2019 07:30)  propranolol 60 mg oral capsule, extended release: 1 cap(s) orally once a day (27 Jun 2019 07:30)  Synthroid 50 mcg (0.05 mg) oral tablet: 1 tab(s) orally once a day (27 Jun 2019 07:30)    HOSPITAL MEDICATIONS:  MEDICATIONS  (STANDING):  ALBUTerol/ipratropium for Nebulization 3 milliLiter(s) Nebulizer every 6 hours  amiodarone    Tablet 200 milliGRAM(s) Oral every 12 hours  aspirin enteric coated 325 milliGRAM(s) Oral daily  atorvastatin 20 milliGRAM(s) Oral at bedtime  chlorhexidine 4% Liquid 1 Application(s) Topical <User Schedule>  famotidine    Tablet 20 milliGRAM(s) Oral daily  furosemide   Injectable 40 milliGRAM(s) IV Push daily  guaiFENesin  milliGRAM(s) Oral every 12 hours  heparin  Injectable 5000 Unit(s) SubCutaneous every 8 hours  levothyroxine 50 MICROGram(s) Oral daily  magnesium sulfate  IVPB 1 Gram(s) IV Intermittent every 12 hours  metoprolol tartrate 50 milliGRAM(s) Oral two times a day  senna 2 Tablet(s) Oral at bedtime    MEDICATIONS  (PRN):  glucagon  Injectable 1 milliGRAM(s) IntraMuscular once PRN Glucose LESS THAN 70 milligrams/deciliter      REVIEW OF SYSTEMS:  CONSTITUTIONAL: [X] all negative; [ ] weakness, [ ] fevers, [ ] chills  EYES/ENT: [X] all negative; [ ] visual changes, [ ] vertigo, [ ] throat pain   NECK: [X] all negative; [ ] pain, [ ] stiffness  RESPIRATORY: [] all negative, [ ] cough, [ ] wheezing, [ ] hemoptysis, [ ] shortness of breath  CARDIOVASCULAR: [] all negative; [ ] chest pain, [ ] palpitations, [ ] orthopnea  GASTROINTESTINAL: [X] all negative; [ ]abdominal pain, [ ] nausea, [ ] vomiting, [ ] hematemesis, [ ] diarrhea, [ ] constipation, [ ] melena, [ ] hematochezia.  GENITOURINARY: [X] all negative; [ ] dysuria, [ ] frequency, [ ] hematuria  NEUROLOGICAL: [X] all negative; [ ] numbness, [ ] weakness  SKIN: [X] all negative; [ ] itching, [ ] burning, [ ] rashes, [ ] lesions   All other review of systems is negative unless indicated above.    [  ] Unable to assess ROS because     PHYSICAL EXAM:          CONSTITUTIONAL: Well-developed; well-nourished; in no acute distress.   	SKIN: warm, dry  	HEAD: Normocephalic; atraumatic.  	EYES: PERRL, EOM, no conj injection, sclera clear  	ENT: No nasal discharge; airway clear.  	NECK: Supple; non tender.  No midline ttp ctls  	CARD: S1, S2 normal; no murmurs, gallops, or rubs. Regular rate and rhythm. 2+ RPs and DPs bilat, no carotid bruits, no pedal   edema, no calf pain b/l  	RESP: CTA  bilat good air movement No wheezes, rales or rhonchi.  	ABD: Soft, not tender, not distended, no CVA ttp no rebound or guarding, bowel sounds present  	EXT: Normal ROM.  No clubbing, cyanosis or edema.   	  	NEURO: Alert, awake, motor 5/5 R, 5/5 L        RADIOLOGY:  xray CTU Attending Progress Daily Note     05 Jul 2019 08:19  POD# - 7  He has history of Prostate cancer  Old myocardial infarction  Hypothyroid  Essential hypertension    Interval event for past 24 hr:  LILIA SIERRA  87y had no event.   Current Complains:  LILIA SIERRA has no new complains  HPI:  87 yrs old with Hx of CAD ( MI 30ys ago) presented for elective LHC.  pt was having chest pain on minimal exertion . today pt had an episode of chest pain with ECG revealing ST depressions.  currently pain free. on nitro drip. (27 Jun 2019 07:23)    OBJECTIVE:  ICU Vital Signs Last 24 Hrs  T(C): 36.9 (05 Jul 2019 04:00), Max: 37.1 (05 Jul 2019 00:00)  T(F): 98.4 (05 Jul 2019 04:00), Max: 98.7 (05 Jul 2019 00:00)  HR: 80 (05 Jul 2019 07:30) (69 - 103)  BP: 106/84 (05 Jul 2019 07:30) (102/56 - 134/55)  BP(mean): 91 (05 Jul 2019 07:30) (74 - 91)  ABP: --  ABP(mean): --  RR: 22 (05 Jul 2019 07:30) (16 - 22)  SpO2: 99% (05 Jul 2019 07:30) (95% - 99%)    I&O's Summary    04 Jul 2019 07:01  -  05 Jul 2019 07:00  --------------------------------------------------------  IN: 1040 mL / OUT: 2100 mL / NET: -1060 mL      I&O's Detail    04 Jul 2019 07:01  -  05 Jul 2019 07:00  --------------------------------------------------------  IN:    IV PiggyBack: 200 mL    Oral Fluid: 840 mL  Total IN: 1040 mL    OUT:    Voided: 2100 mL  Total OUT: 2100 mL    Total NET: -1060 mL          CAPILLARY BLOOD GLUCOSE        LABS:                          9.2    7.59  )-----------( 173      ( 05 Jul 2019 02:00 )             28.7     07-05    138  |  98  |  22<H>  ----------------------------<  111<H>  4.3   |  30  |  1.1    Ca    7.9<L>      05 Jul 2019 02:00  Mg     2.6     07-05            Home Medications:  aspirin 81 mg oral tablet: 1 tab(s) orally once a day (27 Jun 2019 07:30)  atorvastatin 20 mg oral tablet: 1 tab(s) orally once a day (27 Jun 2019 07:30)  isosorbide mononitrate 30 mg oral tablet, extended release: 1 tab(s) orally once a day (in the morning) (27 Jun 2019 07:30)  nitroglycerin 0.4 mg sublingual tablet: pt took in cath lab bathroom in cath lab (27 Jun 2019 07:30)  propranolol 60 mg oral capsule, extended release: 1 cap(s) orally once a day (27 Jun 2019 07:30)  Synthroid 50 mcg (0.05 mg) oral tablet: 1 tab(s) orally once a day (27 Jun 2019 07:30)    HOSPITAL MEDICATIONS:  MEDICATIONS  (STANDING):  ALBUTerol/ipratropium for Nebulization 3 milliLiter(s) Nebulizer every 6 hours  amiodarone    Tablet 200 milliGRAM(s) Oral every 12 hours  aspirin enteric coated 325 milliGRAM(s) Oral daily  atorvastatin 20 milliGRAM(s) Oral at bedtime  chlorhexidine 4% Liquid 1 Application(s) Topical <User Schedule>  famotidine    Tablet 20 milliGRAM(s) Oral daily  furosemide   Injectable 40 milliGRAM(s) IV Push daily  guaiFENesin  milliGRAM(s) Oral every 12 hours  heparin  Injectable 5000 Unit(s) SubCutaneous every 8 hours  levothyroxine 50 MICROGram(s) Oral daily  magnesium sulfate  IVPB 1 Gram(s) IV Intermittent every 12 hours  metoprolol tartrate 50 milliGRAM(s) Oral two times a day  senna 2 Tablet(s) Oral at bedtime    MEDICATIONS  (PRN):  glucagon  Injectable 1 milliGRAM(s) IntraMuscular once PRN Glucose LESS THAN 70 milligrams/deciliter      REVIEW OF SYSTEMS:  CONSTITUTIONAL: [X] all negative; [ ] weakness, [ ] fevers, [ ] chills  EYES/ENT: [X] all negative; [ ] visual changes, [ ] vertigo, [ ] throat pain   NECK: [X] all negative; [ ] pain, [ ] stiffness  RESPIRATORY: [] all negative, [ ] cough, [ ] wheezing, [ ] hemoptysis, [ ] shortness of breath  CARDIOVASCULAR: [] all negative; [ ] chest pain, [ ] palpitations, [ ] orthopnea  GASTROINTESTINAL: [X] all negative; [ ]abdominal pain, [ ] nausea, [ ] vomiting, [ ] hematemesis, [ ] diarrhea, [ ] constipation, [ ] melena, [ ] hematochezia.  GENITOURINARY: [X] all negative; [ ] dysuria, [ ] frequency, [ ] hematuria  NEUROLOGICAL: [X] all negative; [ ] numbness, [ ] weakness  SKIN: [X] all negative; [ ] itching, [ ] burning, [ ] rashes, [ ] lesions   All other review of systems is negative unless indicated above.    [  ] Unable to assess ROS because     PHYSICAL EXAM:          CONSTITUTIONAL: Well-developed; well-nourished; in no acute distress.   	SKIN: warm, dry  	HEAD: Normocephalic; atraumatic.  	EYES: PERRL, EOM, no conj injection, sclera clear  	ENT: No nasal discharge; airway clear.  	NECK: Supple; non tender.  No midline ttp ctls  	CARD: S1, S2 normal; no murmurs, gallops, or rubs. Regular rate and rhythm. 2+ RPs and DPs bilat, no carotid bruits, no pedal   edema, no calf pain b/l  	RESP: CTA  bilat good air movement No wheezes, rales or rhonchi.  	ABD: Soft, not tender, not distended, no CVA ttp no rebound or guarding, bowel sounds present  	EXT: Normal ROM.  No clubbing, cyanosis or edema.   	  	NEURO: Alert, awake, motor 5/5 R, 5/5 L        RADIOLOGY:  xray  < from: Xray Chest 1 View- PORTABLE-Routine (07.05.19 @ 03:48) >  Impression:      Cardiomegaly. CHF. Calcified fibrothorax.    Worsening.    < end of copied text >

## 2019-07-05 NOTE — PROGRESS NOTE ADULT - SUBJECTIVE AND OBJECTIVE BOX
OPERATIVE PROCEDURE(s): cabg x 4               POD # 7    SURGEON(s): selina    SUBJECTIVE ASSESSMENT: pt is without complaints and now wants to go home rather than to SNF being he is saying his daughters will help him at home    Vital Signs Last 24 Hrs  T(C): 36.9 (05 Jul 2019 04:00), Max: 37.1 (05 Jul 2019 00:00)  T(F): 98.4 (05 Jul 2019 04:00), Max: 98.7 (05 Jul 2019 00:00)  HR: 78 (05 Jul 2019 11:00) (69 - 103)  BP: 104/60 (05 Jul 2019 11:00) (102/56 - 133/63)  BP(mean): 78 (05 Jul 2019 11:00) (74 - 91)  RR: 22 (05 Jul 2019 07:30) (16 - 22)  SpO2: 97% (05 Jul 2019 11:00) (95% - 99%)  07-04-19 @ 07:01  -  07-05-19 @ 07:00  --------------------------------------------------------  IN: 1040 mL / OUT: 2100 mL / NET: -1060 mL    07-05-19 @ 07:01  - 07-05-19 @ 11:53  --------------------------------------------------------  IN: 480 mL / OUT: 800 mL / NET: -320 mL        Physical Exam  General: alert and oriented x 3  Chest: lungs cta bl  CVS: s1s2  Abd: pos bs soft nt  GI/ :  Ext: no sig edema;   incisions- c/d/i  sternum- intact    Central Venous Catheter: Yes[ ]  No[ x] , If Yes indication:           Day #    Silva Catheter: Yes  [ ] , No [ x] : If yes indication:                         Day #    NGT: Yes [ ] No [x  ]     If Yes Placement:                                          Day #    Post-Op Beta-Blockers: Yes [x ], No[ ], If No, then contraindication:    CHEST TUBE:  [ ] YES [x ] NO  OUTPUT:     per 24 hours    AIR LEAKS:  [ ] YES [ ] NO      EPICARDIAL WIRES:  [ ] YES [x ] NO      BOWEL MOVEMENT:  [ x] YES [ ] NO          LABS:                        9.2    7.59  )-----------( 173      ( 05 Jul 2019 02:00 )             28.7     COUMADIN:   [ ] YES [x ] NO      07-05    138  |  98  |  22<H>  ----------------------------<  111<H>  4.3   |  30  |  1.1    Ca    7.9<L>      05 Jul 2019 02:00  Mg     2.6     07-05    TPro  5.3<L>  /  Alb  2.9<L>  /  TBili  0.6  /  DBili  0.2  /  AST  33  /  ALT  31  /  AlkPhos  77  07-05        MEDICATIONS  (STANDING):  ALBUTerol/ipratropium for Nebulization 3 milliLiter(s) Nebulizer every 6 hours  amiodarone    Tablet 200 milliGRAM(s) Oral every 12 hours  aspirin enteric coated 325 milliGRAM(s) Oral daily  atorvastatin 20 milliGRAM(s) Oral at bedtime  chlorhexidine 4% Liquid 1 Application(s) Topical <User Schedule>  famotidine    Tablet 20 milliGRAM(s) Oral daily  furosemide   Injectable 40 milliGRAM(s) IV Push daily  guaiFENesin  milliGRAM(s) Oral every 12 hours  heparin  Injectable 5000 Unit(s) SubCutaneous every 8 hours  levothyroxine 50 MICROGram(s) Oral daily  magnesium sulfate  IVPB 1 Gram(s) IV Intermittent every 12 hours  metoprolol tartrate 50 milliGRAM(s) Oral two times a day    MEDICATIONS  (PRN):  glucagon  Injectable 1 milliGRAM(s) IntraMuscular once PRN Glucose LESS THAN 70 milligrams/deciliter      Allergies    No Known Allergies    Intolerances      Ambulation/Activity Status:    ambulated well with pt with walker    RADIOLOGY & ADDITIONAL TESTS:  PROCEDURE DATE:  07/05/2019      INTERPRETATION:  Clinical History / Reason for exam: Shortness of breath.   Coronary artery disease.    Comparison : Chest radiograph prior day.    Technique/Positioning: Frontal portable.    Findings:    Support devices: Telemetry leads overlie the thorax.    Cardiac/mediastinum/hilum: The heart is enlarged. The patient is status   post median sternotomy.    Lung parenchyma/Pleura: There are bilateral pleuraleffusions. There are   calcified pleural plaques.    Skeleton/soft tissues: Unchanged.    Impression:      Cardiomegaly. CHF. Calcified fibrothorax.    EKG  Ventricular Rate 77 BPM    Atrial Rate 101 BPM    QRS Duration 156 ms    Q-T Interval 500 ms    QTC Calculation(Bezet) 565 ms    R Axis 45 degrees    T Axis -53 degrees    Diagnosis Line Atrial fibrillation  Right bundle branch block  T wave abnormality, consider inferolateral ischemia  Abnormal ECG    Confirmed by FERMÍN SMITH MD ([id number]) on 7/5/2019 10:40:51 AM    Assessment/Plan: Patient is a 86 yo male s/p cabg x 4 pod # 7  cont tx as per ct surgeon  s/p cabg- cont asa, statin, and b blocker  hypothyroidism- cont synthroid  gentle diuresis  post op a. fib pt remains rate controlled with amio and lopressor; will dec amio to 200mg daily due to QTc of 565 and no anticoagulation due to patient's high risk for fall    Social Service Disposition:  home today with visiting nurse services

## 2019-07-05 NOTE — PROGRESS NOTE ADULT - PROVIDER SPECIALTY LIST ADULT
CT Surgery
Critical Care
CT Surgery
CT Surgery

## 2019-07-05 NOTE — DISCHARGE NOTE NURSING/CASE MANAGEMENT/SOCIAL WORK - NSDCFUADDAPPT_GEN_ALL_CORE_FT
follow up with Dr. Edmondson in 1-2 weeks, cardiology in 2 weeks, and with pmd in 4 weeks; please call for all appointments

## 2019-07-15 ENCOUNTER — APPOINTMENT (OUTPATIENT)
Dept: CARDIOTHORACIC SURGERY | Facility: CLINIC | Age: 84
End: 2019-07-15

## 2019-07-15 VITALS
WEIGHT: 182 LBS | DIASTOLIC BLOOD PRESSURE: 75 MMHG | BODY MASS INDEX: 27.67 KG/M2 | HEART RATE: 60 BPM | SYSTOLIC BLOOD PRESSURE: 164 MMHG | OXYGEN SATURATION: 96 % | RESPIRATION RATE: 13 BRPM | TEMPERATURE: 97.5 F

## 2019-07-15 PROBLEM — I10 ESSENTIAL (PRIMARY) HYPERTENSION: Chronic | Status: ACTIVE | Noted: 2019-06-27

## 2019-07-15 PROBLEM — C61 MALIGNANT NEOPLASM OF PROSTATE: Chronic | Status: ACTIVE | Noted: 2019-06-27

## 2019-07-15 PROBLEM — E03.9 HYPOTHYROIDISM, UNSPECIFIED: Chronic | Status: ACTIVE | Noted: 2019-06-27

## 2019-07-15 PROBLEM — I25.2 OLD MYOCARDIAL INFARCTION: Chronic | Status: ACTIVE | Noted: 2019-06-27

## 2019-07-15 RX ORDER — LEVOTHYROXINE SODIUM 25 UG/1
25 TABLET ORAL
Qty: 90 | Refills: 0 | Status: DISCONTINUED | COMMUNITY
Start: 2018-03-29 | End: 2019-07-15

## 2019-07-15 RX ORDER — DEXLANSOPRAZOLE 60 MG/1
60 CAPSULE, DELAYED RELEASE ORAL
Refills: 0 | Status: DISCONTINUED | COMMUNITY
End: 2019-07-15

## 2019-07-15 RX ORDER — PROPRANOLOL HYDROCHLORIDE 60 MG/1
60 TABLET ORAL
Refills: 0 | Status: DISCONTINUED | COMMUNITY
End: 2019-07-15

## 2019-07-15 RX ORDER — PROPRANOLOL HYDROCHLORIDE 60 MG/1
60 CAPSULE, EXTENDED RELEASE ORAL
Qty: 90 | Refills: 0 | Status: DISCONTINUED | COMMUNITY
Start: 2018-08-10 | End: 2019-07-15

## 2019-07-15 RX ORDER — NITROGLYCERIN 0.4 MG/1
0.4 TABLET SUBLINGUAL
Refills: 0 | Status: DISCONTINUED | COMMUNITY
End: 2019-07-15

## 2019-07-15 RX ORDER — LEUPROLIDE ACETATE 1 MG/0.2ML
1 KIT SUBCUTANEOUS
Refills: 0 | Status: DISCONTINUED | COMMUNITY
End: 2019-07-15

## 2019-07-15 RX ORDER — AMLODIPINE BESYLATE 5 MG/1
5 TABLET ORAL
Refills: 0 | Status: DISCONTINUED | COMMUNITY
End: 2019-07-15

## 2019-07-16 DIAGNOSIS — E03.9 HYPOTHYROIDISM, UNSPECIFIED: ICD-10-CM

## 2019-07-16 DIAGNOSIS — D69.6 THROMBOCYTOPENIA, UNSPECIFIED: ICD-10-CM

## 2019-07-16 DIAGNOSIS — I25.2 OLD MYOCARDIAL INFARCTION: ICD-10-CM

## 2019-07-16 DIAGNOSIS — I11.0 HYPERTENSIVE HEART DISEASE WITH HEART FAILURE: ICD-10-CM
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
## 2019-07-16 DIAGNOSIS — I87.8 OTHER SPECIFIED DISORDERS OF VEINS: ICD-10-CM

## 2019-07-16 DIAGNOSIS — R07.9 CHEST PAIN, UNSPECIFIED: ICD-10-CM

## 2019-07-16 DIAGNOSIS — I50.32 CHRONIC DIASTOLIC (CONGESTIVE) HEART FAILURE: ICD-10-CM

## 2019-07-16 DIAGNOSIS — I25.82 CHRONIC TOTAL OCCLUSION OF CORONARY ARTERY: ICD-10-CM

## 2019-07-16 DIAGNOSIS — I25.84 CORONARY ATHEROSCLEROSIS DUE TO CALCIFIED CORONARY LESION: ICD-10-CM

## 2019-07-16 DIAGNOSIS — I25.110 ATHEROSCLEROTIC HEART DISEASE OF NATIVE CORONARY ARTERY WITH UNSTABLE ANGINA PECTORIS: ICD-10-CM

## 2019-07-16 DIAGNOSIS — Z85.46 PERSONAL HISTORY OF MALIGNANT NEOPLASM OF PROSTATE: ICD-10-CM

## 2019-07-16 DIAGNOSIS — Z87.891 PERSONAL HISTORY OF NICOTINE DEPENDENCE: ICD-10-CM

## 2019-07-16 DIAGNOSIS — I97.89 OTHER POSTPROCEDURAL COMPLICATIONS AND DISORDERS OF THE CIRCULATORY SYSTEM, NOT ELSEWHERE CLASSIFIED: ICD-10-CM

## 2019-07-16 DIAGNOSIS — D62 ACUTE POSTHEMORRHAGIC ANEMIA: ICD-10-CM

## 2019-07-16 DIAGNOSIS — E78.5 HYPERLIPIDEMIA, UNSPECIFIED: ICD-10-CM

## 2019-07-16 DIAGNOSIS — I48.91 UNSPECIFIED ATRIAL FIBRILLATION: ICD-10-CM
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
## 2019-07-17 ENCOUNTER — MOBILE ON CALL (OUTPATIENT)
Age: 84
End: 2019-07-17

## 2019-07-18 VITALS
SYSTOLIC BLOOD PRESSURE: 122 MMHG | HEART RATE: 60 BPM | RESPIRATION RATE: 12 BRPM | OXYGEN SATURATION: 95 % | DIASTOLIC BLOOD PRESSURE: 80 MMHG

## 2019-07-18 DIAGNOSIS — I25.10 ATHEROSCLEROTIC HEART DISEASE OF NATIVE CORONARY ARTERY W/OUT ANGINA PECTORIS: ICD-10-CM

## 2019-07-18 RX ORDER — FUROSEMIDE 40 MG/1
40 TABLET ORAL
Refills: 0 | Status: DISCONTINUED | COMMUNITY
End: 2019-07-18

## 2019-07-18 RX ORDER — ATORVASTATIN CALCIUM 20 MG/1
20 TABLET, FILM COATED ORAL
Refills: 0 | Status: ACTIVE | COMMUNITY

## 2019-07-18 RX ORDER — LEVOTHYROXINE SODIUM 0.05 MG/1
50 TABLET ORAL DAILY
Refills: 0 | Status: ACTIVE | COMMUNITY

## 2019-07-18 NOTE — PHYSICAL EXAM
[Auscultation Breath Sounds / Voice Sounds] : lungs were clear to auscultation bilaterally [Heart Rate And Rhythm] : heart rate was normal and rhythm regular [Heart Sounds] : normal S1 and S2 [Heart Sounds Gallop] : no gallops [Murmurs] : no murmurs [Heart Sounds Pericardial Friction Rub] : no pericardial rub [Examination Of The Chest] : the chest was normal in appearance [Chest Visual Inspection Thoracic Asymmetry] : no chest asymmetry [Diminished Respiratory Excursion] : normal chest expansion [2+] : left 2+ [1+] : left 1+ [Bowel Sounds] : normal bowel sounds [Abdomen Soft] : soft [Abdomen Tenderness] : non-tender [] : no hepato-splenomegaly [Abdomen Mass (___ Cm)] : no abdominal mass palpated [No Focal Deficits] : no focal deficits [Oriented To Time, Place, And Person] : oriented to person, place, and time [FreeTextEntry1] : sternal incision C/D/I, no click noted

## 2019-07-18 NOTE — HISTORY OF PRESENT ILLNESS
[FreeTextEntry1] : 86 y/o male w/ PMHx of MI, HTN, and CAD s/p CABG on 6/28/19 w/ Dr. Edmondson on 6/28.  Pt is seen in home for a post-op f/u.  Pt appears comfortable.  Pt daughter states that pt has maceration of the intergluteal cleft for which the pt was prescribed lanolin cream.  Pt offers no other questions, complaints or concerns at this time.

## 2019-07-18 NOTE — ASSESSMENT
[FreeTextEntry1] : Education\par 1.  DC instructions reviewed with patient - discussed importance of medications (indication, schedule, side effects, and importance of compliance reviewed) and f/u appointments.\par 2.  Clean incision sites daily - shower indirectly, clean with soap and water, pat dry.  Avoid the use of lotions, ointments, powders, and perfumes on or around incision sites.\par 3.  Sternal precautions - avoid any heavy lifting (>5-10 lbs x 8 weeks), raising both arms above head simultaneously.\par 4.  Place TEDs on in AM prior to getting out of bed and off in PM when returning to bed.\par 5.  Follow a heart healthy diet - low salt, low fat.\par 6.  Exercise daily.\par 7.  Monitor and call [facility] NP for signs and symptoms of infection (redness, drainage, and fever).\par 8.  Monitor daily weights (call for a gain of 2-3 lbs/day or 5-6 lbs/week). \par 9.  Blood sugar control necessary for wound healing if applicable.\par 10.  Avoid straining during BM - use stool softners as needed. \par 11.  Instructed on importance of incentive spirometry use (10x/hour).\par \par Patient verbalized understanding of all education outlined above. Pt instructed to call [facility] NP for any issues as delineated above.\par \par PLAN\par 1.  Monitor daily weights\par 2.  Continue current medications as prescribed\par 3.  Incentive spirometry use\par 4.  Maintain sternal precautions\par 5.  Exercise daily\par 5.  Maintain HH diet\par 6.  Maintain TEDs\par 7.  Keep legs elevated\par 8.  F/U appointments - \par CTSX Dr. Edmondson 7/16\par Cardio Dr. Monge TBRHINA\par PMD Dr. Kay 7/17\par 9.  [facility] NP will continue to f/u with patient status - Pt agrees to call with any questions/concerns\par 10. To recover without complications.\par 11.  Pt advised to T&P while sitting or laying for extended periods of time.\par \par

## 2019-07-25 ENCOUNTER — OUTPATIENT (OUTPATIENT)
Dept: OUTPATIENT SERVICES | Facility: HOSPITAL | Age: 84
LOS: 1 days | Discharge: HOME | End: 2019-07-25

## 2019-07-25 DIAGNOSIS — Z00.00 ENCOUNTER FOR GENERAL ADULT MEDICAL EXAMINATION WITHOUT ABNORMAL FINDINGS: ICD-10-CM

## 2019-07-26 LAB
ANION GAP SERPL CALC-SCNC: 12 MMOL/L
BUN SERPL-MCNC: 16 MG/DL
CALCIUM SERPL-MCNC: 8.7 MG/DL
CHLORIDE SERPL-SCNC: 101 MMOL/L
CO2 SERPL-SCNC: 29 MMOL/L
CREAT SERPL-MCNC: 1.3 MG/DL
GLUCOSE SERPL-MCNC: 92 MG/DL
POTASSIUM SERPL-SCNC: 4.3 MMOL/L
SODIUM SERPL-SCNC: 142 MMOL/L

## 2019-07-29 ENCOUNTER — APPOINTMENT (OUTPATIENT)
Dept: CARDIOTHORACIC SURGERY | Facility: CLINIC | Age: 84
End: 2019-07-29

## 2019-07-29 VITALS
HEART RATE: 67 BPM | WEIGHT: 180 LBS | SYSTOLIC BLOOD PRESSURE: 138 MMHG | BODY MASS INDEX: 27.28 KG/M2 | RESPIRATION RATE: 12 BRPM | DIASTOLIC BLOOD PRESSURE: 68 MMHG | OXYGEN SATURATION: 93 % | TEMPERATURE: 98.7 F | HEIGHT: 68 IN

## 2019-07-29 DIAGNOSIS — L90.9 ATROPHIC DISORDER OF SKIN, UNSPECIFIED: ICD-10-CM

## 2019-07-29 RX ORDER — ACETAMINOPHEN 650 MG/1
650 TABLET, EXTENDED RELEASE ORAL
Qty: 30 | Refills: 0 | Status: DISCONTINUED | COMMUNITY
Start: 2019-07-05

## 2019-07-29 RX ORDER — AMIODARONE HYDROCHLORIDE 200 MG/1
200 TABLET ORAL
Qty: 30 | Refills: 0 | Status: DISCONTINUED | COMMUNITY
Start: 2019-07-05

## 2019-07-29 RX ORDER — ACETAMINOPHEN 325 MG/1
325 TABLET ORAL
Refills: 0 | Status: DISCONTINUED | COMMUNITY
End: 2019-07-29

## 2019-07-29 RX ORDER — FAMOTIDINE 20 MG/1
20 TABLET, FILM COATED ORAL
Qty: 30 | Refills: 0 | Status: DISCONTINUED | COMMUNITY
Start: 2019-07-05 | End: 1900-01-01

## 2019-07-29 RX ORDER — ISOSORBIDE MONONITRATE 60 MG/1
60 TABLET, EXTENDED RELEASE ORAL
Qty: 90 | Refills: 0 | Status: DISCONTINUED | COMMUNITY
Start: 2019-06-06

## 2019-08-05 ENCOUNTER — APPOINTMENT (OUTPATIENT)
Age: 84
End: 2019-08-05
Payer: MEDICARE

## 2019-08-05 VITALS
TEMPERATURE: 97.9 F | OXYGEN SATURATION: 96 % | HEART RATE: 66 BPM | BODY MASS INDEX: 27.58 KG/M2 | SYSTOLIC BLOOD PRESSURE: 130 MMHG | DIASTOLIC BLOOD PRESSURE: 64 MMHG | WEIGHT: 182 LBS | HEIGHT: 68 IN | RESPIRATION RATE: 13 BRPM

## 2019-08-05 PROCEDURE — 99024 POSTOP FOLLOW-UP VISIT: CPT

## 2019-08-05 RX ORDER — POTASSIUM CHLORIDE 1500 MG/1
20 TABLET, FILM COATED, EXTENDED RELEASE ORAL
Qty: 3 | Refills: 0 | Status: DISCONTINUED | COMMUNITY
Start: 2019-07-29 | End: 2019-08-05

## 2019-08-05 RX ORDER — FUROSEMIDE 40 MG/1
40 TABLET ORAL
Qty: 10 | Refills: 0 | Status: DISCONTINUED | COMMUNITY
Start: 2019-07-29 | End: 2019-08-05

## 2019-08-05 RX ORDER — LANOLIN 100 %
OINTMENT (GRAM) TOPICAL
Refills: 0 | Status: DISCONTINUED | COMMUNITY
End: 2019-08-05

## 2019-08-05 RX ORDER — FUROSEMIDE 40 MG/1
40 TABLET ORAL
Refills: 0 | Status: DISCONTINUED | COMMUNITY
End: 2019-08-05

## 2019-09-03 ENCOUNTER — RX RENEWAL (OUTPATIENT)
Age: 84
End: 2019-09-03

## 2019-09-04 ENCOUNTER — APPOINTMENT (OUTPATIENT)
Dept: UROLOGY | Facility: CLINIC | Age: 84
End: 2019-09-04
Payer: MEDICARE

## 2019-09-04 VITALS — HEIGHT: 68 IN | SYSTOLIC BLOOD PRESSURE: 145 MMHG | HEART RATE: 54 BPM | DIASTOLIC BLOOD PRESSURE: 74 MMHG

## 2019-09-04 LAB
BILIRUB UR QL STRIP: NORMAL
CLARITY UR: CLEAR
COLLECTION METHOD: NORMAL
GLUCOSE UR-MCNC: NORMAL
HCG UR QL: NORMAL EU/DL
HGB UR QL STRIP.AUTO: NORMAL
KETONES UR-MCNC: NORMAL
LEUKOCYTE ESTERASE UR QL STRIP: NORMAL
NITRITE UR QL STRIP: NORMAL
PH UR STRIP: 5
PROT UR STRIP-MCNC: NORMAL
SP GR UR STRIP: 1.01

## 2019-09-04 PROCEDURE — 81003 URINALYSIS AUTO W/O SCOPE: CPT | Mod: QW

## 2019-09-04 PROCEDURE — 99213 OFFICE O/P EST LOW 20 MIN: CPT

## 2019-09-04 NOTE — ASSESSMENT
[FreeTextEntry1] : 1. Prostate cancer,  T1C, john 6, diagnosed in 1996\par 2. biochemical recurrence on continuous ADT since 2006-  with slow rise in PSA- ? beginning of  castrate resistance\par \par -cont Q 4 month Lupron injections\par -dw pt And daughter combination therapy-- either Casodex , Enzalutamide, Arbiraterone if PSA rises suggesting chemical recurrence\par -rto 4 months\par -PSA testosterone 4 months\par -DEXA scan\par

## 2019-09-04 NOTE — PHYSICAL EXAM
[General Appearance - Well Developed] : well developed [Normal Appearance] : normal appearance [General Appearance - Well Nourished] : well nourished [Well Groomed] : well groomed [Abdomen Soft] : soft [General Appearance - In No Acute Distress] : no acute distress [Abdomen Tenderness] : non-tender [Costovertebral Angle Tenderness] : no ~M costovertebral angle tenderness [Skin Color & Pigmentation] : normal skin color and pigmentation [Edema] : no peripheral edema [] : no respiratory distress [Respiration, Rhythm And Depth] : normal respiratory rhythm and effort [Exaggerated Use Of Accessory Muscles For Inspiration] : no accessory muscle use [Oriented To Time, Place, And Person] : oriented to person, place, and time [Affect] : the affect was normal [Mood] : the mood was normal [Not Anxious] : not anxious [Normal Station and Gait] : the gait and station were normal for the patient's age [No Focal Deficits] : no focal deficits [No Palpable Adenopathy] : no palpable adenopathy

## 2019-09-04 NOTE — HISTORY OF PRESENT ILLNESS
[FreeTextEntry1] : 87 y.o male with h/o prostate cancer ,here with daughter .Patient is status post CABG  on June 2019 at [facility]\par dx'ed 1996- T1C, john 6, left lobe, PSA 9.0 at diagnosis\par pt treated with EBRT\par  biochemical recurrence in 2006 and started on Lupron depot 4 month- previously followed by Dr. Sanchez- per pts daughter "scans" were done and it did not show metastatic dz\par s/p  injection 2 weeks ago  given by the nurse at the [facility] center\par ECOG = 0\par appetite good\par PSA  = 1.0  8/2019    testost ==  10\par PSA = 0.9   5/2019    testosterone 5\par           0.7  12/2018 \par           0.5  test 4-  9/5/18\par           0.6   12/2017 [Urinary Urgency] : no urinary urgency [Urinary Frequency] : no urinary frequency [Straining] : no straining [Nocturia] : nocturia [Weak Stream] : no weak stream [Dysuria] : no dysuria [Hematuria - Gross] : no gross hematuria [Abdominal Pain] : no abdominal pain [Flank Pain] : no flank pain [Fever] : no fever [Fatigue] : no fatigue [Nausea] : no nausea [Anorexia] : no anorexia [None] : None

## 2019-09-05 ENCOUNTER — RX RENEWAL (OUTPATIENT)
Age: 84
End: 2019-09-05

## 2019-09-25 ENCOUNTER — RX RENEWAL (OUTPATIENT)
Age: 84
End: 2019-09-25

## 2019-09-25 ENCOUNTER — APPOINTMENT (OUTPATIENT)
Dept: CARDIOTHORACIC SURGERY | Facility: CLINIC | Age: 84
End: 2019-09-25
Payer: MEDICARE

## 2019-09-25 VITALS
WEIGHT: 182 LBS | BODY MASS INDEX: 27.67 KG/M2 | DIASTOLIC BLOOD PRESSURE: 63 MMHG | HEART RATE: 63 BPM | TEMPERATURE: 98.1 F | OXYGEN SATURATION: 96 % | SYSTOLIC BLOOD PRESSURE: 141 MMHG | RESPIRATION RATE: 14 BRPM

## 2019-09-25 DIAGNOSIS — Z95.1 PRESENCE OF AORTOCORONARY BYPASS GRAFT: ICD-10-CM

## 2019-09-25 DIAGNOSIS — R60.0 LOCALIZED EDEMA: ICD-10-CM

## 2019-09-25 DIAGNOSIS — Z71.2 PERSON CONSULTING FOR EXPLANATION OF EXAMINATION OR TEST FINDINGS: ICD-10-CM

## 2019-09-25 DIAGNOSIS — R22.9 LOCALIZED SWELLING, MASS AND LUMP, UNSPECIFIED: ICD-10-CM

## 2019-09-25 PROCEDURE — 99024 POSTOP FOLLOW-UP VISIT: CPT

## 2019-09-26 NOTE — ASSESSMENT
[FreeTextEntry1] : 87 year old male nonsmoker with PMHx of CAD (MI 30ys ago), Prostate CA, HTN, HLD and hypothyroidism, status post recent CABG on 6/28/2019, presented today for the lump at the vein harvest site in the left leg. .  He otherwise had an uneventful hospital course and was discharged home in stable condition on POD # 8. \par \par Overall patient is doing well\par Breathing improved, active with ADLs, left LE  lump- neg for DVT - no pain, clinically stable, no acute distress, + 1-2 b/l edema (not taking his diuretic as prescribed).  Pt/family Education provided.  All questions answered.\par \par Venous duplex revealed a fluid collection probably secondary to liquefying hematoma. There is no any evidence of infection or drainage. SVG harvest sites are healing well. There is no need for any intervention at this time.\par \par Continue Medication Regimen as per Dr. Monge -  ( on  mg) \par Instructed pt to take Lasix as prescribed by Dr. Monge for b/l LE edema\par Increase activity as tolerated\par Pt d/c from CTS service, RTO as needed.   \par \par

## 2019-09-26 NOTE — REVIEW OF SYSTEMS
[Negative] : Genitourinary [Fever] : no fever [Chills] : no chills [Feeling Poorly] : not feeling poorly [Feeling Tired] : not feeling tired [Chest Pain] : no chest pain [Palpitations] : no palpitations [Lower Ext Edema] : no extremity edema [Shortness Of Breath] : no shortness of breath [Cough] : no cough [Wheezing] : no wheezing [SOB on Exertion] : no shortness of breath during exertion [Confused] : no confusion [Dizziness] : no dizziness [Fainting] : no fainting [Limb Weakness] : no limb weakness [Difficulty Walking] : no difficulty walking

## 2019-09-26 NOTE — HISTORY OF PRESENT ILLNESS
[FreeTextEntry1] : Pt reports feeling well.  He reports breathing better since his sx.  Pt walking more than before.  Pt's daughter states, on a water pill that Dr. Monge started 2 weeks ago.  However, pt is not taking his diuretic.  Pt had US of left leg for "lump"- impression: no evidence for DVT in left lower extremity.  The palpable mass corresponds to a 3.7 cm complex cyst, possible

## 2019-09-26 NOTE — CONSULT LETTER
[Dear  ___] : Dear  [unfilled], [FreeTextEntry1] : I had the pleasure of seeing . LILIA SIERRA today in my office today for a post op visit. As you well know he  is recovering well after his coronary artery bypass surgery that took place on 6/28/2019. From a surgical standpoint he is doing well. If you have any questions or concerns please feel free to contact me at [phone].  I thank you for allowing me be involved in this patient's care. Thank you for the referral and your support of [facility].\par \par Sincerely,\par Natalio Edmondson MD, FACS, FACC\par Cardiothoracic Surgery\par

## 2019-09-26 NOTE — PHYSICAL EXAM
[Sclera] : the sclera and conjunctiva were normal [Neck Appearance] : the appearance of the neck was normal [Neck Cervical Mass (___cm)] : no neck mass was observed [Jugular Venous Distention Increased] : there was no jugular-venous distention [] : no respiratory distress [Respiration, Rhythm And Depth] : normal respiratory rhythm and effort [Exaggerated Use Of Accessory Muscles For Inspiration] : no accessory muscle use [Auscultation Breath Sounds / Voice Sounds] : lungs were clear to auscultation bilaterally [Heart Rate And Rhythm] : heart rate was normal and rhythm regular [Heart Sounds] : normal S1 and S2 [Murmurs] : no murmurs [Heart Sounds Gallop] : no gallops [Heart Sounds Pericardial Friction Rub] : no pericardial rub [Pitting Edema] : pitting edema present [___ +] : bilateral [unfilled]+ pitting edema to the ankles [Abdomen Soft] : soft [Abnormal Walk] : normal gait [Nail Clubbing] : no clubbing  or cyanosis of the fingernails [Involuntary Movements] : no involuntary movements were seen [Skin Color & Pigmentation] : normal skin color and pigmentation [Oriented To Time, Place, And Person] : oriented to person, place, and time [Impaired Insight] : insight and judgment were intact [Affect] : the affect was normal [Mood] : the mood was normal [FreeTextEntry1] : scar healing well, s/p mid sternectomy

## 2019-09-26 NOTE — REASON FOR VISIT
[Follow-Up: _____] : a [unfilled] follow-up visit [Family Member] : family member [FreeTextEntry1] : CABG X 4V on 6/28/19

## 2020-01-06 ENCOUNTER — TRANSCRIPTION ENCOUNTER (OUTPATIENT)
Age: 85
End: 2020-01-06

## 2020-01-06 ENCOUNTER — OUTPATIENT (OUTPATIENT)
Dept: EMERGENCY DEPT | Facility: HOSPITAL | Age: 85
LOS: 1 days | Discharge: HOME | End: 2020-01-06
Payer: MEDICARE

## 2020-01-06 VITALS
TEMPERATURE: 98 F | RESPIRATION RATE: 20 BRPM | WEIGHT: 190.04 LBS | SYSTOLIC BLOOD PRESSURE: 212 MMHG | HEART RATE: 102 BPM | DIASTOLIC BLOOD PRESSURE: 100 MMHG | HEIGHT: 68 IN | OXYGEN SATURATION: 97 %

## 2020-01-06 VITALS
SYSTOLIC BLOOD PRESSURE: 167 MMHG | HEART RATE: 66 BPM | DIASTOLIC BLOOD PRESSURE: 76 MMHG | OXYGEN SATURATION: 97 % | RESPIRATION RATE: 18 BRPM

## 2020-01-06 DIAGNOSIS — Z95.1 PRESENCE OF AORTOCORONARY BYPASS GRAFT: Chronic | ICD-10-CM

## 2020-01-06 LAB
ALBUMIN SERPL ELPH-MCNC: 3.8 G/DL — SIGNIFICANT CHANGE UP (ref 3.5–5.2)
ALP SERPL-CCNC: 98 U/L — SIGNIFICANT CHANGE UP (ref 30–115)
ALT FLD-CCNC: 27 U/L — SIGNIFICANT CHANGE UP (ref 0–41)
ANION GAP SERPL CALC-SCNC: 13 MMOL/L — SIGNIFICANT CHANGE UP (ref 7–14)
APTT BLD: 29 SEC — SIGNIFICANT CHANGE UP (ref 27–39.2)
AST SERPL-CCNC: 29 U/L — SIGNIFICANT CHANGE UP (ref 0–41)
BILIRUB SERPL-MCNC: 0.3 MG/DL — SIGNIFICANT CHANGE UP (ref 0.2–1.2)
BUN SERPL-MCNC: 24 MG/DL — HIGH (ref 10–20)
CALCIUM SERPL-MCNC: 9.1 MG/DL — SIGNIFICANT CHANGE UP (ref 8.5–10.1)
CHLORIDE SERPL-SCNC: 102 MMOL/L — SIGNIFICANT CHANGE UP (ref 98–110)
CO2 SERPL-SCNC: 24 MMOL/L — SIGNIFICANT CHANGE UP (ref 17–32)
CREAT SERPL-MCNC: 1.2 MG/DL — SIGNIFICANT CHANGE UP (ref 0.7–1.5)
GLUCOSE SERPL-MCNC: 145 MG/DL — HIGH (ref 70–99)
HCT VFR BLD CALC: 35.7 % — LOW (ref 42–52)
HGB BLD-MCNC: 11.2 G/DL — LOW (ref 14–18)
INR BLD: 1.03 RATIO — SIGNIFICANT CHANGE UP (ref 0.65–1.3)
MAGNESIUM SERPL-MCNC: 2.1 MG/DL — SIGNIFICANT CHANGE UP (ref 1.8–2.4)
MCHC RBC-ENTMCNC: 27.4 PG — SIGNIFICANT CHANGE UP (ref 27–31)
MCHC RBC-ENTMCNC: 31.4 G/DL — LOW (ref 32–37)
MCV RBC AUTO: 87.3 FL — SIGNIFICANT CHANGE UP (ref 80–94)
NRBC # BLD: 0 /100 WBCS — SIGNIFICANT CHANGE UP (ref 0–0)
PLATELET # BLD AUTO: 204 K/UL — SIGNIFICANT CHANGE UP (ref 130–400)
POTASSIUM SERPL-MCNC: 4.7 MMOL/L — SIGNIFICANT CHANGE UP (ref 3.5–5)
POTASSIUM SERPL-SCNC: 4.7 MMOL/L — SIGNIFICANT CHANGE UP (ref 3.5–5)
PROT SERPL-MCNC: 6.9 G/DL — SIGNIFICANT CHANGE UP (ref 6–8)
PROTHROM AB SERPL-ACNC: 11.8 SEC — SIGNIFICANT CHANGE UP (ref 9.95–12.87)
RBC # BLD: 4.09 M/UL — LOW (ref 4.7–6.1)
RBC # FLD: 16.2 % — HIGH (ref 11.5–14.5)
SODIUM SERPL-SCNC: 139 MMOL/L — SIGNIFICANT CHANGE UP (ref 135–146)
TROPONIN T SERPL-MCNC: <0.01 NG/ML — SIGNIFICANT CHANGE UP
WBC # BLD: 6.81 K/UL — SIGNIFICANT CHANGE UP (ref 4.8–10.8)
WBC # FLD AUTO: 6.81 K/UL — SIGNIFICANT CHANGE UP (ref 4.8–10.8)

## 2020-01-06 PROCEDURE — ZZZZZ: CPT

## 2020-01-06 PROCEDURE — 93010 ELECTROCARDIOGRAM REPORT: CPT

## 2020-01-06 PROCEDURE — 99285 EMERGENCY DEPT VISIT HI MDM: CPT

## 2020-01-06 PROCEDURE — 71045 X-RAY EXAM CHEST 1 VIEW: CPT | Mod: 26

## 2020-01-06 RX ORDER — ATORVASTATIN CALCIUM 80 MG/1
40 TABLET, FILM COATED ORAL AT BEDTIME
Refills: 0 | Status: DISCONTINUED | OUTPATIENT
Start: 2020-01-06 | End: 2020-01-06

## 2020-01-06 RX ORDER — APIXABAN 2.5 MG/1
1 TABLET, FILM COATED ORAL
Qty: 60 | Refills: 0
Start: 2020-01-06

## 2020-01-06 RX ORDER — FUROSEMIDE 40 MG
40 TABLET ORAL DAILY
Refills: 0 | Status: DISCONTINUED | OUTPATIENT
Start: 2020-01-06 | End: 2020-01-06

## 2020-01-06 RX ORDER — METOPROLOL TARTRATE 50 MG
25 TABLET ORAL
Refills: 0 | Status: DISCONTINUED | OUTPATIENT
Start: 2020-01-06 | End: 2020-01-06

## 2020-01-06 RX ORDER — APIXABAN 2.5 MG/1
5 TABLET, FILM COATED ORAL
Refills: 0 | Status: DISCONTINUED | OUTPATIENT
Start: 2020-01-06 | End: 2020-01-06

## 2020-01-06 RX ORDER — ASPIRIN/CALCIUM CARB/MAGNESIUM 324 MG
81 TABLET ORAL DAILY
Refills: 0 | Status: DISCONTINUED | OUTPATIENT
Start: 2020-01-06 | End: 2020-01-06

## 2020-01-06 RX ORDER — LEVOTHYROXINE SODIUM 125 MCG
50 TABLET ORAL DAILY
Refills: 0 | Status: DISCONTINUED | OUTPATIENT
Start: 2020-01-06 | End: 2020-01-06

## 2020-01-06 RX ORDER — MAGNESIUM SULFATE 500 MG/ML
2 VIAL (ML) INJECTION ONCE
Refills: 0 | Status: COMPLETED | OUTPATIENT
Start: 2020-01-06 | End: 2020-01-06

## 2020-01-06 RX ADMIN — Medication 50 GRAM(S): at 04:28

## 2020-01-06 NOTE — DISCHARGE NOTE PROVIDER - HOSPITAL COURSE
HPI:    88 M PMHx of CAD (MI 30ys ago), 4vessel CABG 1 year ago, Prostate CA, HTN, HLD and hypothyroidism presented with sudden onset palpitations after awoke from sleep last night to use restroom. Denies any associated symptoms of SOB chest pain. States symptoms resolved in ED. Initial EKG in ED was afib, now currently patient NSR on tele monitor. You came to ED with Afib, which developed last year after CABG procedure. It is recommended that you take blood thinner, eg eliquis to reduce the risk of stroke. Adverse events including stroke, cardiovascular events including hert attack, heart block or death can occur. Patient voiced understanding and wants to leave against medical advice and meet cardiologist outpoatient to discuss blood thinner I.e anticoagulationj.

## 2020-01-06 NOTE — ED PROVIDER NOTE - OBJECTIVE STATEMENT
87 y/o male with pmhx of CABG x4 June 2019, hypothyroidism, DLD, HTN presents with palpitations. Patient states he woke up in the middle of the night to use the restroom, began to feel palpitations. Patient states he doesn't feel them anymore while in the ED. Denies hx of afib however in prior charts, patient had afib post-op from CABG, was treated with amiodarone, patient was never placed on AC due to fall risk. Patient denies fevers/chills, sob, chest pain, lightheadedness, syncope, n/v/d, abdominal pain, increased leg swelling, hx of PE/DVT, recent travel.

## 2020-01-06 NOTE — ED ADULT NURSE NOTE - CHPI ED NUR SYMPTOMS NEG
no vomiting/no fever/no dizziness/no diaphoresis/no nausea/no back pain/no chills/no chest pain/no syncope/no congestion/no shortness of breath

## 2020-01-06 NOTE — H&P ADULT - NSICDXPASTMEDICALHX_GEN_ALL_CORE_FT
PAST MEDICAL HISTORY:  Essential hypertension     Hypothyroid     Old myocardial infarction     Prostate cancer

## 2020-01-06 NOTE — ED PROVIDER NOTE - NS ED ROS FT
Constitutional: See HPI.  Eyes: No visual changes, eye pain or discharge.  ENMT: No hearing changes, pain, discharge or infections. No neck pain or stiffness.  Cardiac: +palpitations; No chest pain, SOB or edema. No chest pain with exertion.  Respiratory: No cough or respiratory distress.   GI: No nausea, vomiting, diarrhea or abdominal pain.  : No dysuria, frequency or burning.  MS: No myalgia, muscle weakness, joint pain or back pain.  Neuro: No headache or weakness. No LOC.  Skin: No skin rash.  Endo: No hx of DM, + thyroid disease  Except as documented in HPI, all other review of systems is negative

## 2020-01-06 NOTE — H&P ADULT - NSHPLABSRESULTS_GEN_ALL_CORE
11.2   6.81  )-----------( 204      ( 06 Jan 2020 04:12 )             35.7   01-06    139  |  102  |  24<H>  ----------------------------<  145<H>  4.7   |  24  |  1.2    Ca    9.1      06 Jan 2020 04:12  Mg     2.1     01-06    TPro  6.9  /  Alb  3.8  /  TBili  0.3  /  DBili  x   /  AST  29  /  ALT  27  /  AlkPhos  98  01-06

## 2020-01-06 NOTE — H&P ADULT - HISTORY OF PRESENT ILLNESS
88 M PMHx of CAD (MI 30ys ago), 4vessel CABG 1 year ago, Prostate CA, HTN, HLD and hypothyroidism presented with sudden onset palpitations after awoke from sleep last night to use restroom. Denies any associated symptoms of SOB chest pain. States symptoms resolved in ED. Initial EKG in ED was afib, now currently patient NSR on tele monitor. On chart review pt post op course s/p CABG was complicated by a. fib. He was treated with Amio and was not started on AC due to his high risk for fall. Pt is currently off of Amio. Pt and daughter at bedside. He denies hx of recurrent falls, has fell only once in past 2 years. Patient denies fevers/chills, sob, chest pain, lightheadedness, syncope, n/v/d, abdominal pain, increased leg swelling, hx of PE/DVT, recent travel. Pt feels well requesting to go home.

## 2020-01-06 NOTE — ED PROVIDER NOTE - ATTENDING CONTRIBUTION TO CARE
Patient states that woke up to go to bathroom, at that time, started feeling palpitations, which continued, so had decided to come to ED, denies cp/sob/abd pain.   vitals noted  lungs: CTA, no crackles  abd: +BS, NT, ND, soft  a/P: Palpitations  labs, EKG, CXR

## 2020-01-06 NOTE — H&P ADULT - ASSESSMENT
88 M PMHx of CAD (MI 30ys ago), 4vessel CABG 1 year ago, Prostate CA, HTN, HLD and hypothyroidism presented for sudden onset palpitations noted to be in Afib, pt hx of CABG 1 yr ago post op course complicated by afib treated with amio (currently off amio) elected not to treat with AC due fall risk.     # Recurrent Afib - currently in NSR. CHADVASC 4. Pt denied hx of recurrent falls. d/w patient risk benefit of AC and he is okay with AC. Cardio c/s. Check thyroid.     # CAD s/p CABG - resume asa, statin, bb, and lasix  # hypothyroidism - resume synthroid  # DVT PPx - will start AC.   # Activity -  (X) Increase as Tolerated   # Dispo -   Anticipate dc within 24 hours. from home   # Code Status - (X) FULL 88 M PMHx of CAD (MI 30ys ago), 4vessel CABG 1 year ago, Prostate CA, HTN, HLD and hypothyroidism presented for sudden onset palpitations noted to be in Afib, pt hx of CABG 1 yr ago post op course complicated by afib treated with amio (currently off amio) elected not to treat with AC due fall risk.     # Recurrent Afib - currently in NSR. CHADVASC 4. Pt denied hx of recurrent falls. d/w patient risk benefit of AC and he is okay with AC. Start Eliquis. Check if insurance covers in AM. Cardio c/s. Check thyroid.     # CAD s/p CABG - resume asa, statin, bb, and lasix  # hypothyroidism - resume synthroid  # DVT PPx - will start AC.   # Activity -  (X) Increase as Tolerated   # Dispo -   Anticipate dc within 24 hours. from home   # Code Status - (X) FULL

## 2020-01-06 NOTE — DISCHARGE NOTE PROVIDER - NSDCFUSCHEDAPPT_GEN_ALL_CORE_FT
LILIA SIERRA ; 01/09/2020 ; NPP Urology [facility] LILIA SIERRA ; 01/09/2020 ; NPP Urology [facility]

## 2020-01-06 NOTE — H&P ADULT - NSHPPHYSICALEXAM_GEN_ALL_CORE
Vital Signs Last 24 Hrs  T(F): 98.3 (06 Jan 2020 03:35), Max: 98.3 (06 Jan 2020 03:35)  HR: 64 (06 Jan 2020 06:35) (64 - 102)  BP: 185/79 (06 Jan 2020 06:35) (185/79 - 212/100)  RR: 18 (06 Jan 2020 06:35) (18 - 20)  SpO2: 99% (06 Jan 2020 06:35) (97% - 99%)    PHYSICAL EXAM:  GENERAL: The patient is a well-developed, well-nourished in no apparent distress. AOX3.  HEENT: NCAT   NECK: Supple. No lymphadenopathy or thyromegaly.  LUNGS: No respiratory distress or accessory muscle use. CTAB  HEART: RRR, S1 S2 Audible  ABDOMEN: Soft, nontender, and nondistended.  No gaurding or rigidity.  No hepatosplenomegaly was noted.  EXTREMITIES: Without any cyanosis, clubbing, rash, lesions. 1+ pitting edema b/l

## 2020-01-06 NOTE — DISCHARGE NOTE PROVIDER - CARE PROVIDER_API CALL
Michele Monge (MD)  Cardiovascular Disease; Interventional Cardiology  [address] [place] [address]  Phone: [phone]  Fax: [phone]  Established Patient  Follow Up Time: 1-3 days

## 2020-01-06 NOTE — ED PROVIDER NOTE - CLINICAL SUMMARY MEDICAL DECISION MAKING FREE TEXT BOX
patient presented to ED for A. fib, this is second episode, first one was after his recent surgery, resolved spontaneously, not on anticoagulation, remained hemodynamically stable in ED, will admit to Tele for further evaluation and care, patient with multiple co-morbidities, calls placed to his CT surgeon.

## 2020-01-06 NOTE — DISCHARGE NOTE PROVIDER - NSDCMRMEDTOKEN_GEN_ALL_CORE_FT
apixaban 5 mg oral tablet: 1 tab(s) orally 2 times a day  Aspir 81 oral delayed release tablet: 1 tab(s) orally once a day  Lasix 40 mg oral tablet: 1 tab(s) orally once a day   Lipitor 40 mg oral tablet: 1 tab(s) orally once a day  metoprolol tartrate 50 mg oral tablet: 1 tab(s) orally 2 times a day  Synthroid 50 mcg (0.05 mg) oral tablet: 1 tab(s) orally once a day

## 2020-01-06 NOTE — DISCHARGE NOTE PROVIDER - NSDCCPCAREPLAN_GEN_ALL_CORE_FT
PRINCIPAL DISCHARGE DIAGNOSIS  Diagnosis: New onset atrial fibrillation  Assessment and Plan of Treatment: PRINCIPAL DISCHARGE DIAGNOSIS  Diagnosis: New onset atrial fibrillation  Assessment and Plan of Treatment: Atrial Fibrillation     Atrial fibrillation is a type of heartbeat that is irregular or fast (rapid). If you have this condition, your heart beats without any order. This makes it hard for your heart to pump blood in a normal way. Having this condition gives you more risk for stroke, heart failure, and other heart problems.  Atrial fibrillation may start all of a sudden and then stop on its own, or it may become a long-lasting problem.  What are the causes?  This condition may be caused by heart conditions, such as:  High blood pressure.Heart failure.Heart valve disease.Heart surgery.Other causes include:  Pneumonia.Obstructive sleep apnea.Lung cancer.Thyroid disease.Drinking too much alcohol.Sometimes the cause is not known.  What increases the risk?  You are more likely to develop this condition if:  You smoke.You are older.You have diabetes.You are overweight.You have a family history of this condition.You exercise often and hard.What are the signs or symptoms?  Common symptoms of this condition include:  A feeling like your heart is beating very fast.Chest pain.Feeling short of breath.Feeling light-headed or weak.Getting tired easily.Follow these instructions at home:  Medicines   Take over-the-counter and prescription medicines only as told by your doctor.If your doctor gives you a blood-thinning medicine, take it exactly as told. Taking too much of it can cause bleeding. Taking too little of it does not protect you against clots. Clots can cause a stroke.Lifestyle         Do not use any tobacco products. These include cigarettes, chewing tobacco, and e-cigarettes. If you need help quitting, ask your doctor.Do not drink alcohol.Do not drink beverages that have caffeine. These include coffee, soda, and tea.Follow diet instructions as told by your doctor.Exercise regularly as told by your doctor.General instructions   If you have a condition that causes breathing to stop for a short period of time (apnea), treat it as told by your doctor.Keep a healthy weight. Do not use diet

## 2020-01-06 NOTE — ED PROVIDER NOTE - PHYSICAL EXAMINATION
CONSTITUTIONAL: Well-developed; well-nourished; in no acute distress.   SKIN: warm, dry  HEAD: Normocephalic; atraumatic.  EYES: no conj injection  ENT: No nasal discharge; airway clear.  NECK: Supple; non tender.  CARD: S1, S2 normal; no murmurs, gallops, or rubs. +afib, rate controlled  RESP: No wheezes, rales or rhonchi.  ABD: soft ntnd  EXT: b/l lower extremity 2+ edema   NEURO: Alert, oriented, grossly unremarkable  PSYCH: Cooperative, appropriate.

## 2020-01-06 NOTE — ED ADULT NURSE NOTE - OBJECTIVE STATEMENT
Pt came c/o feeling like his heart is racing, denies chest pain, c/o palpitations on and off, no dizziness, no chills, no other complaints, pt is not in any distress.

## 2020-01-07 LAB — TSH SERPL-MCNC: 5.42 UIU/ML — HIGH (ref 0.27–4.2)

## 2020-01-09 ENCOUNTER — APPOINTMENT (OUTPATIENT)
Dept: UROLOGY | Facility: CLINIC | Age: 85
End: 2020-01-09
Payer: MEDICARE

## 2020-01-09 VITALS — HEIGHT: 68 IN | WEIGHT: 182 LBS | BODY MASS INDEX: 27.58 KG/M2

## 2020-01-09 LAB
BILIRUB UR QL STRIP: NORMAL
CLARITY UR: CLEAR
COLLECTION METHOD: NORMAL
GLUCOSE UR-MCNC: NORMAL
HCG UR QL: NORMAL EU/DL
HGB UR QL STRIP.AUTO: NORMAL
KETONES UR-MCNC: NORMAL
LEUKOCYTE ESTERASE UR QL STRIP: NORMAL
NITRITE UR QL STRIP: NORMAL
PH UR STRIP: 6.5
PROT UR STRIP-MCNC: NORMAL
SP GR UR STRIP: 1.01

## 2020-01-09 PROCEDURE — 81003 URINALYSIS AUTO W/O SCOPE: CPT | Mod: QW

## 2020-01-09 PROCEDURE — 99213 OFFICE O/P EST LOW 20 MIN: CPT

## 2020-01-09 NOTE — REASON FOR VISIT
[Follow-up Visit ___] : a follow-up visit  for [unfilled] [Spouse] : spouse [Family Member] : family member [Source: ______] : History obtained from [unfilled]

## 2020-01-09 NOTE — END OF VISIT
[FreeTextEntry3] : Pt seen ,evaluated ,examined  by me with PA present and agree to findings , plan\par

## 2020-01-09 NOTE — HISTORY OF PRESENT ILLNESS
[Nocturia] : nocturia [None] : None [FreeTextEntry1] : 88 y.o male with h/o prostate cancer ,here with daughter .Patient is status post CABG  on June 2019 at [facility]\par dx'ed 1996- T1C, john 6, left lobe, PSA 9.0 at diagnosis\par pt treated with EBRT\par  biochemical recurrence in 2006 and started on Lupron depot 4 month- previously followed by Dr. Sanchez- per pts daughter "scans" were done and it did not show metastatic dz\par s/p  lupron injection  given by the nurse at the [facility] early December 2019\par ECOG = 0\par appetite good\par \par PSA   1.3  1/2020   test= 4                              dexa scan-9/2019-  normal\par           1.0  8/2019    testost =  10\par           0.9   5/2019    testosterone=  5               udip- neg\par           0.7  12/2018 \par           0.5  test 4-  9/5/18\par           0.6   12/2017 [Urinary Urgency] : no urinary urgency [Urinary Frequency] : no urinary frequency [Straining] : no straining [Weak Stream] : no weak stream [Dysuria] : no dysuria [Abdominal Pain] : no abdominal pain [Hematuria - Gross] : no gross hematuria [Flank Pain] : no flank pain [Fever] : no fever [Fatigue] : no fatigue [Anorexia] : no anorexia [Nausea] : no nausea

## 2020-01-09 NOTE — PHYSICAL EXAM
[General Appearance - Well Developed] : well developed [General Appearance - Well Nourished] : well nourished [Well Groomed] : well groomed [General Appearance - In No Acute Distress] : no acute distress [Normal Appearance] : normal appearance [Abdomen Tenderness] : non-tender [Costovertebral Angle Tenderness] : no ~M costovertebral angle tenderness [Abdomen Soft] : soft [Skin Color & Pigmentation] : normal skin color and pigmentation [Edema] : no peripheral edema [] : no respiratory distress [Respiration, Rhythm And Depth] : normal respiratory rhythm and effort [Affect] : the affect was normal [Oriented To Time, Place, And Person] : oriented to person, place, and time [Exaggerated Use Of Accessory Muscles For Inspiration] : no accessory muscle use [Mood] : the mood was normal [Not Anxious] : not anxious [Normal Station and Gait] : the gait and station were normal for the patient's age [No Palpable Adenopathy] : no palpable adenopathy [No Focal Deficits] : no focal deficits

## 2020-01-09 NOTE — ASSESSMENT
[FreeTextEntry1] : 1. Prostate cancer,  T1C, jonh 6, diagnosed in 1996\par 2. biochemical recurrence on continuous ADT since 2006-  with slow rise in PSA-  beginning of  castrate resistance\par \par -cont Q 4 month Lupron injections\par -will add casodex 5-mg daily --consider Enzalutimide \par -rto 4 months\par -PSA/ testosterone 4 month\par

## 2020-01-10 DIAGNOSIS — I10 ESSENTIAL (PRIMARY) HYPERTENSION: ICD-10-CM

## 2020-01-10 DIAGNOSIS — Z79.82 LONG TERM (CURRENT) USE OF ASPIRIN: ICD-10-CM

## 2020-01-10 DIAGNOSIS — Z95.5 PRESENCE OF CORONARY ANGIOPLASTY IMPLANT AND GRAFT: ICD-10-CM

## 2020-01-10 DIAGNOSIS — E03.9 HYPOTHYROIDISM, UNSPECIFIED: ICD-10-CM

## 2020-01-10 DIAGNOSIS — Z87.891 PERSONAL HISTORY OF NICOTINE DEPENDENCE: ICD-10-CM

## 2020-01-10 DIAGNOSIS — E78.5 HYPERLIPIDEMIA, UNSPECIFIED: ICD-10-CM

## 2020-01-10 DIAGNOSIS — I25.10 ATHEROSCLEROTIC HEART DISEASE OF NATIVE CORONARY ARTERY WITHOUT ANGINA PECTORIS: ICD-10-CM

## 2020-01-10 DIAGNOSIS — Z85.46 PERSONAL HISTORY OF MALIGNANT NEOPLASM OF PROSTATE: ICD-10-CM

## 2020-01-10 DIAGNOSIS — I25.2 OLD MYOCARDIAL INFARCTION: ICD-10-CM

## 2020-01-10 DIAGNOSIS — R00.2 PALPITATIONS: ICD-10-CM

## 2020-01-10 DIAGNOSIS — I48.91 UNSPECIFIED ATRIAL FIBRILLATION: ICD-10-CM

## 2020-02-12 ENCOUNTER — OUTPATIENT (OUTPATIENT)
Dept: OUTPATIENT SERVICES | Facility: HOSPITAL | Age: 85
LOS: 1 days | Discharge: HOME | End: 2020-02-12
Payer: MEDICARE

## 2020-02-12 VITALS
SYSTOLIC BLOOD PRESSURE: 190 MMHG | DIASTOLIC BLOOD PRESSURE: 80 MMHG | HEIGHT: 68 IN | HEART RATE: 68 BPM | WEIGHT: 192.02 LBS | RESPIRATION RATE: 16 BRPM | OXYGEN SATURATION: 95 % | TEMPERATURE: 98 F

## 2020-02-12 DIAGNOSIS — Z98.49 CATARACT EXTRACTION STATUS, UNSPECIFIED EYE: Chronic | ICD-10-CM

## 2020-02-12 DIAGNOSIS — Z01.818 ENCOUNTER FOR OTHER PREPROCEDURAL EXAMINATION: ICD-10-CM

## 2020-02-12 DIAGNOSIS — Z95.1 PRESENCE OF AORTOCORONARY BYPASS GRAFT: Chronic | ICD-10-CM

## 2020-02-12 DIAGNOSIS — G56.02 CARPAL TUNNEL SYNDROME, LEFT UPPER LIMB: ICD-10-CM

## 2020-02-12 DIAGNOSIS — G56.01 CARPAL TUNNEL SYNDROME, RIGHT UPPER LIMB: ICD-10-CM

## 2020-02-12 PROCEDURE — 93010 ELECTROCARDIOGRAM REPORT: CPT

## 2020-02-12 NOTE — H&P PST ADULT - NSICDXPASTMEDICALHX_GEN_ALL_CORE_FT
PAST MEDICAL HISTORY:  Afib 2019    CAD (coronary artery disease)     Essential hypertension     Hypothyroid     Old myocardial infarction     Prostate cancer

## 2020-02-12 NOTE — H&P PST ADULT - HISTORY OF PRESENT ILLNESS
88YR OLD MALE STATES HAS BEEN HAVING NUMBNESS AND PAIN ALL THE FINGERS EXCEPT THE FIFTH, IN BOTH HANDS FOR ABOUT 2YRS-PROGRESSIVELY WORSENED. Presents to pretesting for B/L OPEN CARPAL TUNNEL RELEASE. Denies c/o CP, PALP, SOB, URI, FEVER, RASH OR UTI SYMPTOMS. Exercise saleem 1-2 FOS, PLAYS GOLF, DOES ALL BASIC AND ADVANCED ADLs.

## 2020-02-18 NOTE — ASU PATIENT PROFILE, ADULT - PMH
Afib  2019  CAD (coronary artery disease)    Essential hypertension    Hypothyroid    Old myocardial infarction    Prostate cancer

## 2020-02-18 NOTE — ASU PATIENT PROFILE, ADULT - ACCEPTABLE
[Dear  ___] : Dear  [unfilled], [Courtesy Letter:] : I had the pleasure of seeing your patient, [unfilled], in my office today. [Please see my note below.] : Please see my note below. [Consult Closing:] : Thank you very much for allowing me to participate in the care of this patient.  If you have any questions, please do not hesitate to contact me. [Sincerely,] : Sincerely, [FreeTextEntry3] : Addie Beaver MD\par Director, Pediatric Epilepsy\par [facility]\par , Pediatric Neurology Residency Program\par ,\par [employer] [facility]\par [address] [place] [address], [facility] [address]\par [place] [address]\par Phone: [phone]\par Fax: [phone]\par \par  5

## 2020-02-19 ENCOUNTER — OUTPATIENT (OUTPATIENT)
Dept: OUTPATIENT SERVICES | Facility: HOSPITAL | Age: 85
LOS: 1 days | Discharge: HOME | End: 2020-02-19

## 2020-02-19 VITALS
RESPIRATION RATE: 16 BRPM | TEMPERATURE: 98 F | WEIGHT: 192.02 LBS | HEIGHT: 68 IN | OXYGEN SATURATION: 96 % | DIASTOLIC BLOOD PRESSURE: 77 MMHG | SYSTOLIC BLOOD PRESSURE: 179 MMHG | HEART RATE: 58 BPM

## 2020-02-19 VITALS — DIASTOLIC BLOOD PRESSURE: 82 MMHG | HEART RATE: 63 BPM | RESPIRATION RATE: 16 BRPM | SYSTOLIC BLOOD PRESSURE: 180 MMHG

## 2020-02-19 DIAGNOSIS — Z98.49 CATARACT EXTRACTION STATUS, UNSPECIFIED EYE: Chronic | ICD-10-CM

## 2020-02-19 DIAGNOSIS — Z95.1 PRESENCE OF AORTOCORONARY BYPASS GRAFT: Chronic | ICD-10-CM

## 2020-02-19 RX ORDER — HYDROMORPHONE HYDROCHLORIDE 2 MG/ML
0.5 INJECTION INTRAMUSCULAR; INTRAVENOUS; SUBCUTANEOUS
Refills: 0 | Status: DISCONTINUED | OUTPATIENT
Start: 2020-02-19 | End: 2020-02-19

## 2020-02-19 RX ORDER — ONDANSETRON 8 MG/1
4 TABLET, FILM COATED ORAL ONCE
Refills: 0 | Status: DISCONTINUED | OUTPATIENT
Start: 2020-02-19 | End: 2020-03-07

## 2020-02-19 RX ORDER — SODIUM CHLORIDE 9 MG/ML
1000 INJECTION, SOLUTION INTRAVENOUS
Refills: 0 | Status: DISCONTINUED | OUTPATIENT
Start: 2020-02-19 | End: 2020-03-07

## 2020-02-19 RX ORDER — OXYCODONE HYDROCHLORIDE 5 MG/1
5 TABLET ORAL ONCE
Refills: 0 | Status: DISCONTINUED | OUTPATIENT
Start: 2020-02-19 | End: 2020-02-19

## 2020-02-19 RX ADMIN — SODIUM CHLORIDE 100 MILLILITER(S): 9 INJECTION, SOLUTION INTRAVENOUS at 11:35

## 2020-02-19 NOTE — BRIEF OPERATIVE NOTE - NSICDXBRIEFPOSTOP_GEN_ALL_CORE_FT
POST-OP DIAGNOSIS:  Carpal tunnel syndrome, left 19-Feb-2020 10:43:43  Jerald Cerda  Carpal tunnel syndrome, right 19-Feb-2020 10:43:32  Jerald Cerda

## 2020-02-19 NOTE — ASU DISCHARGE PLAN (ADULT/PEDIATRIC) - ASU DC SPECIAL INSTRUCTIONSFT

## 2020-02-19 NOTE — BRIEF OPERATIVE NOTE - NSICDXBRIEFPREOP_GEN_ALL_CORE_FT
PRE-OP DIAGNOSIS:  Left carpal tunnel syndrome 19-Feb-2020 10:43:25  Jerald Cerda  Right carpal tunnel syndrome 19-Feb-2020 10:43:19  Jerald Cerda

## 2020-02-19 NOTE — CHART NOTE - NSCHARTNOTEFT_GEN_A_CORE
PACU ANESTHESIA ADMISSION NOTE      Procedure: Release, carpal tunnel    Post op diagnosis:  Carpal tunnel syndrome, left  Carpal tunnel syndrome, right      ____  Intubated  TV:______       Rate: ______      FiO2: ______    _x___  Patent Airway    _x___  Full return of protective reflexes    __x__  Full recovery from anesthesia / back to baseline     Vitals:   T:  97.4         R:  16                BP:   132/70               Sat:    98               P: 84      Mental Status:  __x__ Awake   __x___ Alert   _____ Drowsy   _____ Sedated    Nausea/Vomiting:  __x__ NO  ______Yes,   See Post - Op Orders          Pain Scale (0-10):  _____    Treatment: __x__ None    ____ See Post - Op/PCA Orders    Post - Operative Fluids:   ____ Oral   __x__ See Post - Op Orders    Plan: Discharge:   _x___Home       _____Floor     _____Critical Care    _____  Other:_________________    Comments:  Pt brought to RR spontaneously breathing, hemodynamically stable

## 2020-02-25 DIAGNOSIS — I48.91 UNSPECIFIED ATRIAL FIBRILLATION: ICD-10-CM

## 2020-02-25 DIAGNOSIS — Z79.82 LONG TERM (CURRENT) USE OF ASPIRIN: ICD-10-CM

## 2020-02-25 DIAGNOSIS — I25.10 ATHEROSCLEROTIC HEART DISEASE OF NATIVE CORONARY ARTERY WITHOUT ANGINA PECTORIS: ICD-10-CM

## 2020-02-25 DIAGNOSIS — I10 ESSENTIAL (PRIMARY) HYPERTENSION: ICD-10-CM

## 2020-02-25 DIAGNOSIS — G56.03 CARPAL TUNNEL SYNDROME, BILATERAL UPPER LIMBS: ICD-10-CM

## 2020-02-25 DIAGNOSIS — Z95.1 PRESENCE OF AORTOCORONARY BYPASS GRAFT: ICD-10-CM

## 2020-02-25 DIAGNOSIS — E03.9 HYPOTHYROIDISM, UNSPECIFIED: ICD-10-CM

## 2020-02-25 DIAGNOSIS — C61 MALIGNANT NEOPLASM OF PROSTATE: ICD-10-CM

## 2020-05-06 PROBLEM — I25.10 ATHEROSCLEROTIC HEART DISEASE OF NATIVE CORONARY ARTERY WITHOUT ANGINA PECTORIS: Chronic | Status: ACTIVE | Noted: 2020-02-12

## 2020-05-06 PROBLEM — I48.91 UNSPECIFIED ATRIAL FIBRILLATION: Chronic | Status: ACTIVE | Noted: 2020-02-12

## 2020-05-14 ENCOUNTER — APPOINTMENT (OUTPATIENT)
Dept: UROLOGY | Facility: CLINIC | Age: 85
End: 2020-05-14
Payer: MEDICARE

## 2020-05-14 PROCEDURE — 99213 OFFICE O/P EST LOW 20 MIN: CPT

## 2020-05-14 RX ORDER — LEUPROLIDE ACETATE 30 MG
30 KIT INTRAMUSCULAR
Qty: 1 | Refills: 5 | Status: ACTIVE | OUTPATIENT
Start: 2020-05-14

## 2020-09-03 ENCOUNTER — APPOINTMENT (OUTPATIENT)
Dept: UROLOGY | Facility: CLINIC | Age: 85
End: 2020-09-03
Payer: MEDICARE

## 2020-09-03 VITALS — WEIGHT: 200 LBS | BODY MASS INDEX: 30.31 KG/M2 | TEMPERATURE: 97.2 F | HEIGHT: 68 IN

## 2020-09-03 PROCEDURE — 96402 CHEMO HORMON ANTINEOPL SQ/IM: CPT

## 2020-09-22 ENCOUNTER — APPOINTMENT (OUTPATIENT)
Dept: UROLOGY | Facility: CLINIC | Age: 85
End: 2020-09-22
Payer: MEDICARE

## 2020-09-22 PROCEDURE — 99213 OFFICE O/P EST LOW 20 MIN: CPT

## 2020-09-28 NOTE — DISCHARGE NOTE PROVIDER - NSDCCPTREATMENT_GEN_ALL_CORE_FT
Add 52 Modifier (Optional): no Medical Necessity Information: It is in your best interest to select a reason for this procedure from the list below. All of these items fulfill various CMS LCD requirements except the new and changing color options. Medical Necessity Clause: This procedure was medically necessary because the lesions that were treated were: Post-Care Instructions: I reviewed with the patient in detail post-care instructions. Patient is to wear sunprotection, and avoid picking at any of the treated lesions. Pt may apply Vaseline to crusted or scabbing areas. Detail Level: Detailed Consent: The patient's consent was obtained including but not limited to risks of crusting, scabbing, blistering, scarring, darker or lighter pigmentary change, recurrence, incomplete removal and infection. PRINCIPAL PROCEDURE  Procedure: CABG, using 1 arterial and 2 venous grafts  Findings and Treatment:

## 2020-12-02 ENCOUNTER — APPOINTMENT (OUTPATIENT)
Dept: UROLOGY | Facility: CLINIC | Age: 85
End: 2020-12-02
Payer: MEDICARE

## 2020-12-02 VITALS — BODY MASS INDEX: 30.31 KG/M2 | HEIGHT: 68 IN | WEIGHT: 200 LBS

## 2020-12-02 LAB
BILIRUB UR QL STRIP: NORMAL
CLARITY UR: CLEAR
COLLECTION METHOD: NORMAL
GLUCOSE UR-MCNC: NORMAL
HCG UR QL: NORMAL EU/DL
HGB UR QL STRIP.AUTO: NORMAL
KETONES UR-MCNC: NORMAL
LEUKOCYTE ESTERASE UR QL STRIP: NORMAL
NITRITE UR QL STRIP: NORMAL
PH UR STRIP: 7
PROT UR STRIP-MCNC: NORMAL
SP GR UR STRIP: 1.01

## 2020-12-02 PROCEDURE — 99213 OFFICE O/P EST LOW 20 MIN: CPT | Mod: 25

## 2020-12-02 PROCEDURE — 99072 ADDL SUPL MATRL&STAF TM PHE: CPT

## 2020-12-02 PROCEDURE — 81003 URINALYSIS AUTO W/O SCOPE: CPT | Mod: QW

## 2020-12-02 PROCEDURE — 96402 CHEMO HORMON ANTINEOPL SQ/IM: CPT

## 2020-12-28 NOTE — ASU PATIENT PROFILE, ADULT - MEDICATION ADMINISTRATION INFO, PROFILE
Breann Moreno at [facility], will update if other provider in the clinic would take the pt  Release of Information: none  Future Treatment Considerations: per symptoms.   Return for Follow Up: will follow up with Breann at [facility] JOHN Duvall, for FYI.           no concerns

## 2021-01-12 RX ORDER — LEUPROLIDE ACETATE 30 MG
30 KIT INTRAMUSCULAR
Qty: 1 | Refills: 5 | Status: ACTIVE | OUTPATIENT
Start: 2021-01-12

## 2021-03-18 ENCOUNTER — APPOINTMENT (OUTPATIENT)
Dept: UROLOGY | Facility: CLINIC | Age: 86
End: 2021-03-18
Payer: MEDICARE

## 2021-03-18 PROCEDURE — 99072 ADDL SUPL MATRL&STAF TM PHE: CPT

## 2021-03-18 PROCEDURE — 99213 OFFICE O/P EST LOW 20 MIN: CPT

## 2021-03-19 NOTE — HISTORY OF PRESENT ILLNESS
[Nocturia] : nocturia [None] : None [FreeTextEntry1] : 89 y.o male with h/o prostate cancer, BCR,  CRPC, here with daughter for f/u. \par \par Prostate CA dx'ed 1996- T1C, john 6, left lobe, PSA 9.0 at diagnosis\par pt treated with EBRT\par  biochemical recurrence in 2006 and started on Lupron depot 4 month- previously followed by Dr. Sanchez- per pts daughter "scans" were done and it did not show metastatic dz\par PSA has been on Lupron alone- casodex 50mg added on the 1/2020 visit for rising PSA but stopped on 12/2020 for rising PSA\par s/p  Lupron injection  given by us 12/2/20 (usually given by the nurse at the [facility] center-that nurse left and pt was unable to get the injection there)\par \par pt doing ok\par c/o urinary hesitancy- worse at night\par new PSA for this visit not done \par \par From most recent labs: 11/20\par UA - neg\par BUN 19\par Creat 1.2\par GFR 52\par \par ECOG = 0\par appetite good\par golfs\par voids well\par \par PSA     1.7 11/2020 - on CAB \par            1.1   9/2020  - on CAB  test- Pending\par            0.9   5/2020-  on CAB     test- 5\par           1.3  1/2020   test= 4                              dexa scan-9/2019-  normal\par           1.0  8/2019    testost =  10\par           0.9   5/2019    testosterone=  5               \par           0.7  12/2018 \par           0.5  test 4-  9/5/18\par           0.6   12/2017 [Urinary Urgency] : no urinary urgency [Urinary Frequency] : no urinary frequency [Straining] : no straining [Weak Stream] : no weak stream [Dysuria] : no dysuria [Hematuria - Gross] : no gross hematuria [Abdominal Pain] : no abdominal pain [Flank Pain] : no flank pain [Fever] : no fever [Fatigue] : no fatigue [Nausea] : no nausea [Anorexia] : no anorexia

## 2021-03-19 NOTE — ASSESSMENT
[FreeTextEntry1] : 1. Prostate cancer,  T1C, john 6, diagnosed in 1996\par 2. 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

## 2021-03-19 NOTE — PHYSICAL EXAM
[General Appearance - Well Developed] : well developed [General Appearance - Well Nourished] : well nourished [Normal Appearance] : normal appearance [Well Groomed] : well groomed [General Appearance - In No Acute Distress] : no acute distress [Abdomen Soft] : soft [Abdomen Tenderness] : non-tender [Costovertebral Angle Tenderness] : no ~M costovertebral angle tenderness [Skin Color & Pigmentation] : normal skin color and pigmentation [Edema] : no peripheral edema [] : no respiratory distress [Respiration, Rhythm And Depth] : normal respiratory rhythm and effort [Exaggerated Use Of Accessory Muscles For Inspiration] : no accessory muscle use [Oriented To Time, Place, And Person] : oriented to person, place, and time [Affect] : the affect was normal [Mood] : the mood was normal [Not Anxious] : not anxious [Normal Station and Gait] : the gait and station were normal for the patient's age [No Focal Deficits] : no focal deficits [No Palpable Adenopathy] : no palpable adenopathy [FreeTextEntry1] : eligard 22.5mg given deep subcut to right buttock- pt tolerated well  LOT- [id number]  EXP- 10/2022

## 2021-03-23 ENCOUNTER — OUTPATIENT (OUTPATIENT)
Dept: OUTPATIENT SERVICES | Facility: HOSPITAL | Age: 86
LOS: 1 days | Discharge: HOME | End: 2021-03-23
Payer: MEDICARE

## 2021-03-23 ENCOUNTER — RESULT REVIEW (OUTPATIENT)
Age: 86
End: 2021-03-23

## 2021-03-23 DIAGNOSIS — Z98.49 CATARACT EXTRACTION STATUS, UNSPECIFIED EYE: Chronic | ICD-10-CM

## 2021-03-23 DIAGNOSIS — Z95.1 PRESENCE OF AORTOCORONARY BYPASS GRAFT: Chronic | ICD-10-CM

## 2021-03-23 DIAGNOSIS — R97.21 RISING PSA FOLLOWING TREATMENT FOR MALIGNANT NEOPLASM OF PROSTATE: ICD-10-CM

## 2021-03-23 PROCEDURE — 76857 US EXAM PELVIC LIMITED: CPT | Mod: 26

## 2021-03-24 ENCOUNTER — NON-APPOINTMENT (OUTPATIENT)
Age: 86
End: 2021-03-24

## 2021-03-25 ENCOUNTER — APPOINTMENT (OUTPATIENT)
Dept: UROLOGY | Facility: CLINIC | Age: 86
End: 2021-03-25
Payer: MEDICARE

## 2021-03-25 PROCEDURE — 51703 INSERT BLADDER CATH COMPLEX: CPT

## 2021-03-25 PROCEDURE — 99072 ADDL SUPL MATRL&STAF TM PHE: CPT

## 2021-03-29 ENCOUNTER — NON-APPOINTMENT (OUTPATIENT)
Age: 86
End: 2021-03-29

## 2021-03-29 ENCOUNTER — OUTPATIENT (OUTPATIENT)
Dept: OUTPATIENT SERVICES | Facility: HOSPITAL | Age: 86
LOS: 1 days | Discharge: HOME | End: 2021-03-29
Payer: MEDICARE

## 2021-03-29 ENCOUNTER — RESULT REVIEW (OUTPATIENT)
Age: 86
End: 2021-03-29

## 2021-03-29 DIAGNOSIS — Z95.1 PRESENCE OF AORTOCORONARY BYPASS GRAFT: Chronic | ICD-10-CM

## 2021-03-29 DIAGNOSIS — Z98.49 CATARACT EXTRACTION STATUS, UNSPECIFIED EYE: Chronic | ICD-10-CM

## 2021-03-29 DIAGNOSIS — C61 MALIGNANT NEOPLASM OF PROSTATE: ICD-10-CM

## 2021-03-29 PROCEDURE — 76775 US EXAM ABDO BACK WALL LIM: CPT | Mod: 26

## 2021-03-31 ENCOUNTER — APPOINTMENT (OUTPATIENT)
Dept: UROLOGY | Facility: CLINIC | Age: 86
End: 2021-03-31
Payer: MEDICARE

## 2021-03-31 PROCEDURE — 99072 ADDL SUPL MATRL&STAF TM PHE: CPT

## 2021-03-31 PROCEDURE — 99213 OFFICE O/P EST LOW 20 MIN: CPT

## 2021-04-06 ENCOUNTER — APPOINTMENT (OUTPATIENT)
Dept: UROLOGY | Facility: CLINIC | Age: 86
End: 2021-04-06

## 2021-04-06 LAB — BACTERIA UR CULT: NORMAL

## 2021-04-13 ENCOUNTER — NON-APPOINTMENT (OUTPATIENT)
Age: 86
End: 2021-04-13

## 2021-04-16 ENCOUNTER — NON-APPOINTMENT (OUTPATIENT)
Age: 86
End: 2021-04-16

## 2021-05-04 ENCOUNTER — APPOINTMENT (OUTPATIENT)
Dept: UROLOGY | Facility: CLINIC | Age: 86
End: 2021-05-04
Payer: MEDICARE

## 2021-05-04 VITALS
BODY MASS INDEX: 30.31 KG/M2 | WEIGHT: 200 LBS | DIASTOLIC BLOOD PRESSURE: 63 MMHG | HEART RATE: 71 BPM | HEIGHT: 68 IN | SYSTOLIC BLOOD PRESSURE: 140 MMHG | TEMPERATURE: 97.1 F

## 2021-05-04 PROCEDURE — 99072 ADDL SUPL MATRL&STAF TM PHE: CPT

## 2021-05-04 PROCEDURE — 99214 OFFICE O/P EST MOD 30 MIN: CPT

## 2021-05-04 NOTE — PHYSICAL EXAM
[General Appearance - Well Developed] : well developed [General Appearance - Well Nourished] : well nourished [Normal Appearance] : normal appearance [Well Groomed] : well groomed [General Appearance - In No Acute Distress] : no acute distress [Abdomen Soft] : soft [Abdomen Tenderness] : non-tender [Costovertebral Angle Tenderness] : no ~M costovertebral angle tenderness [Skin Color & Pigmentation] : normal skin color and pigmentation [Edema] : no peripheral edema [] : no respiratory distress [Respiration, Rhythm And Depth] : normal respiratory rhythm and effort [Exaggerated Use Of Accessory Muscles For Inspiration] : no accessory muscle use [Oriented To Time, Place, And Person] : oriented to person, place, and time [Affect] : the affect was normal [Mood] : the mood was normal [Not Anxious] : not anxious [Normal Station and Gait] : the gait and station were normal for the patient's age [No Focal Deficits] : no focal deficits [No Palpable Adenopathy] : no palpable adenopathy [FreeTextEntry1] : 16 Palauan catheter inserted- resistance felt at the distal urethra and bladder neck, but soft and catheter was able to be passed- 700cc clear urine drained and catheter removed per pts request

## 2021-05-04 NOTE — ASSESSMENT
[FreeTextEntry1] : 1. Prostate cancer,  T1C, john 6, diagnosed in 1996\par 2. 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 bladder decompensation, \par 4. Recurrent UTIs - stable\par \par Plan:\par -Discussed self- catheterization instead of wearing a catheter the entire day.  \par -Discussed D/C the catheter and monitor pt bladder through a bladder scan.\par -Discussed continuing with the catheter on and returning to the office for a catheter change every 4-5 weeks.\par -Pt wants to continue having a catheter until seeing Dr. Phan with UDS results.Will decide on catheter in the next  visit. \par -rto 5/26/2021 or 5/27/2021. \par \par

## 2021-05-04 NOTE — END OF VISIT
[FreeTextEntry3] : Patient notes was transcribed by medical scribe  Macro         under the supervision of Dr. Carlisle.\par And I have   reviewed the patient's chart and agree that it alines  with my medical decisions.\par

## 2021-05-04 NOTE — HISTORY OF PRESENT ILLNESS
[Nocturia] : nocturia [None] : None [FreeTextEntry1] : 89 y.o male with h/o prostate cancer, BCR,  CRPC, here with daughter for f/u of UTI and elevated PVR. Pt is accompanied by his daughter. \par \par Silva was placed in the ER 4/4/2021 in [place]. Silva was changed 1-2 weeks ago. Pt saw a urologist in [place]- MD Sylvester. Pt had urodynamics 4/26/21 and cystoscope by Dr. Phan on 4/15/2021 with no results for UDS yet. UDS results will come in Friday 5/7/21. Cystoscopy= neg. Pt will come for [facility] in June.\par \par All past and present data reviewed:\par 3/21 bladder scan-  694ml\par 4/21 BUN= 25, CREAT= 1.34, eGFR= 47\par 11/20 BUN= 20, Creat= 1.3, GFR 46\par \par 3/21  UCx= neg\par 3/21 Renal US= no hydro // Bladder US= Bladder wall thickening, Prevoid volume 930 cc, Post void= 573 cc. Prostate volume 26 cc. \par \par pts history:\par Prostate CA dx'ed 1996- T1C, john 6, left lobe, PSA 9.0 at diagnosis\par pt treated with EBRT\par  biochemical recurrence in 2006 and started on Lupron depot 4 month- previously followed by Dr. Sanchez- per pts daughter "scans" were done and it did not show metastatic dz\par PSA has been on Lupron alone- casodex 50mg added on the 1/2020 visit for rising PSA but stopped on 12/2020 for continued rising PSA\par last injection given 3/2021\par \par  \par PSA     2.6  3/2021    testosterone- 7.0 \par            1.7 11/2020 - Casodex stopped \par            1.1   9/2020  - on CAB  test- Pending\par            0.9   5/2020-  on CAB     test- 5\par           1.3  1/2020   test= 4                              dexa scan-9/2019-  normal\par           1.0  8/2019    testost =  10\par           0.9   5/2019    testosterone=  5               \par           0.7  12/2018 \par           0.5  test 4-  9/5/18\par           0.6   12/2017\par \par Renal us-   3/2021-  kidney nl, no hydronephrosis\par urine cx-  3/26/21-   >100,000 cfu/ml e coli [Urinary Urgency] : no urinary urgency [Urinary Frequency] : no urinary frequency [Straining] : no straining [Weak Stream] : no weak stream [Dysuria] : no dysuria [Hematuria - Gross] : no gross hematuria [Abdominal Pain] : no abdominal pain [Flank Pain] : no flank pain [Fever] : no fever [Fatigue] : no fatigue [Nausea] : no nausea [Anorexia] : no anorexia

## 2021-05-26 ENCOUNTER — APPOINTMENT (OUTPATIENT)
Dept: UROLOGY | Facility: CLINIC | Age: 86
End: 2021-05-26
Payer: MEDICARE

## 2021-05-26 VITALS — HEIGHT: 68 IN | TEMPERATURE: 97.8 F | WEIGHT: 205 LBS | BODY MASS INDEX: 31.07 KG/M2

## 2021-05-26 PROCEDURE — 99214 OFFICE O/P EST MOD 30 MIN: CPT

## 2021-05-31 NOTE — ASU PREOP CHECKLIST - TEMPERATURE IN CELSIUS (DEGREES C)
36.6
I personally performed the service described in the documentation recorded by the scribe in my presence, and it accurately and completely records my words and actions.

## 2021-06-16 ENCOUNTER — APPOINTMENT (OUTPATIENT)
Dept: UROLOGY | Facility: CLINIC | Age: 86
End: 2021-06-16
Payer: MEDICARE

## 2021-06-16 VITALS
BODY MASS INDEX: 30.31 KG/M2 | SYSTOLIC BLOOD PRESSURE: 134 MMHG | WEIGHT: 200 LBS | TEMPERATURE: 97.7 F | DIASTOLIC BLOOD PRESSURE: 76 MMHG | HEIGHT: 68 IN | HEART RATE: 57 BPM

## 2021-06-16 PROCEDURE — 96402 CHEMO HORMON ANTINEOPL SQ/IM: CPT

## 2021-06-16 PROCEDURE — 99213 OFFICE O/P EST LOW 20 MIN: CPT | Mod: 25

## 2021-06-16 RX ORDER — ASPIRIN 325 MG/1
325 TABLET, FILM COATED ORAL DAILY
Qty: 30 | Refills: 0 | Status: DISCONTINUED | COMMUNITY
End: 2021-06-16

## 2021-07-07 ENCOUNTER — APPOINTMENT (OUTPATIENT)
Dept: UROLOGY | Facility: CLINIC | Age: 86
End: 2021-07-07
Payer: MEDICARE

## 2021-07-07 VITALS — WEIGHT: 200 LBS | HEIGHT: 68 IN | BODY MASS INDEX: 30.31 KG/M2

## 2021-07-07 PROCEDURE — 51703 INSERT BLADDER CATH COMPLEX: CPT

## 2021-08-18 ENCOUNTER — APPOINTMENT (OUTPATIENT)
Dept: UROLOGY | Facility: CLINIC | Age: 86
End: 2021-08-18
Payer: MEDICARE

## 2021-08-18 VITALS — BODY MASS INDEX: 30.31 KG/M2 | WEIGHT: 200 LBS | HEIGHT: 68 IN

## 2021-08-18 DIAGNOSIS — N32.89 OTHER SPECIFIED DISORDERS OF BLADDER: ICD-10-CM

## 2021-08-18 PROCEDURE — 51702 INSERT TEMP BLADDER CATH: CPT

## 2021-08-19 PROBLEM — N32.89 BLADDER WALL THICKENING: Status: ACTIVE | Noted: 2021-05-04

## 2021-09-14 ENCOUNTER — RX RENEWAL (OUTPATIENT)
Age: 86
End: 2021-09-14

## 2021-09-21 ENCOUNTER — APPOINTMENT (OUTPATIENT)
Dept: UROLOGY | Facility: CLINIC | Age: 86
End: 2021-09-21
Payer: MEDICARE

## 2021-09-21 VITALS — BODY MASS INDEX: 30.31 KG/M2 | WEIGHT: 200 LBS | HEIGHT: 68 IN

## 2021-09-21 PROCEDURE — 99212 OFFICE O/P EST SF 10 MIN: CPT | Mod: 24

## 2021-09-21 PROCEDURE — 51702 INSERT TEMP BLADDER CATH: CPT

## 2021-09-21 PROCEDURE — 51703 INSERT BLADDER CATH COMPLEX: CPT

## 2021-09-21 PROCEDURE — 96402 CHEMO HORMON ANTINEOPL SQ/IM: CPT

## 2021-09-27 NOTE — ED PROVIDER NOTE - FAMILY DETAILS FREE TEXT FOR MDM ADDL HISTORY OBTAINED FROM QUESTION
78 y/o female with a PMHx of DM2, pna, quadriplegia, advanced dementia, DM, CVA, GERD, HTN presents to the ED c/o recent admitted to [place] a 9/7-9/15 after being found unresponsive at nursing home, concerned about post operative distress, hypotensive signs of aspirational pna, with volume dissipated, treated with levosa, zosyn. Admitted to ICU for aspirational pna. Urinary culture proteus, which was also sensitive to Zosyn. Also appears to have paronychia of toe which she had Zyvox during hospital stay. She was found to be anemic so she was transfused of 1 L RBC, no signs of active bleeding and pt stabilized. Pt was stabilized and d/c back to rehab. Patient reported for reported respiratory distress. Pt found to be on normal vent settings, does have increased  respiratory rate. No further hx can be obtained at this time as pt is nonverbal.  sepsis - shock - chr resp failure - anemia - acute infection - pna - dysphagia - anoxia - dementia     on ABX  HOB elev  oral hygiene  skin care  assist with ADL  full code - spoke with  - HCP  cx in progress  ABG noted  vent support  monitor VS and HD and Sat  CCM notes reviewed  vent support in progress - not a candidate for weaning  old records reviewed Patient wife in ED

## 2021-10-12 ENCOUNTER — APPOINTMENT (OUTPATIENT)
Dept: HEMATOLOGY ONCOLOGY | Facility: CLINIC | Age: 86
End: 2021-10-12
Payer: MEDICARE

## 2021-10-12 ENCOUNTER — LABORATORY RESULT (OUTPATIENT)
Age: 86
End: 2021-10-12

## 2021-10-12 VITALS
HEART RATE: 70 BPM | HEIGHT: 68 IN | RESPIRATION RATE: 16 BRPM | DIASTOLIC BLOOD PRESSURE: 79 MMHG | TEMPERATURE: 98.6 F | WEIGHT: 200 LBS | BODY MASS INDEX: 30.31 KG/M2 | SYSTOLIC BLOOD PRESSURE: 155 MMHG

## 2021-10-12 DIAGNOSIS — I48.91 UNSPECIFIED ATRIAL FIBRILLATION: ICD-10-CM

## 2021-10-12 PROCEDURE — 99205 OFFICE O/P NEW HI 60 MIN: CPT

## 2021-10-12 RX ORDER — BICALUTAMIDE 50 MG/1
50 TABLET ORAL DAILY
Qty: 90 | Refills: 3 | Status: COMPLETED | COMMUNITY
Start: 2020-01-09 | End: 2021-10-12

## 2021-10-12 RX ORDER — SILODOSIN 8 MG/1
8 CAPSULE ORAL
Qty: 90 | Refills: 3 | Status: COMPLETED | COMMUNITY
Start: 2021-03-24 | End: 2021-10-12

## 2021-10-12 RX ORDER — SULFAMETHOXAZOLE AND TRIMETHOPRIM 800; 160 MG/1; MG/1
800-160 TABLET ORAL
Qty: 14 | Refills: 0 | Status: COMPLETED | COMMUNITY
Start: 2021-03-25 | End: 2021-10-12

## 2021-10-12 RX ORDER — BICALUTAMIDE 50 MG/1
50 TABLET ORAL DAILY
Qty: 90 | Refills: 3 | Status: COMPLETED | COMMUNITY
Start: 2020-05-14 | End: 2021-10-12

## 2021-10-12 RX ORDER — ZINC OXIDE 13 %
CREAM (GRAM) TOPICAL
Refills: 0 | Status: COMPLETED | COMMUNITY
End: 2021-10-12

## 2021-10-12 NOTE — HISTORY OF PRESENT ILLNESS
[de-identified] : CC: I have prostate cancer \par \par He is here at the request of Dr. Carlisle and Loy\par \par History obtaind form patient. Daughter and Dr. Carlisle and Loy notes\par \par Briefly this is an 89 year old male with history of CAD s/p CABG, afib on apixaban and rest as bellow as well as prostate cancer. His prostate cancer history \par Prostate CA dx'ed 1996- T1C, john 6, left lobe, PSA 9.0 at diagnosis. pt treated with EBRT  biochemical recurrence in 2006 and started on Lupron depot 4 month- previously followed by Dr. Sanchez- per pts daughter "scans" were done and it did not show metastatic dz\par PSA has been on Lupron alone- casodex 50mg added on the 1/2020 visit for rising PSA but stopped on 12/2020 for continued rising PSA.\par \par \par  PSA 5.3 9/2021 testosterone <10 PSADT- 5.3 months\par  3.1 6/2021 \par  2.6 3/2021 testosterone- 7.0 \par  1.7 11/2020 - Casodex stopped \par  1.1 9/2020 - on CAB test- Pending\par  0.9 5/2020- on CAB test- 5\par  1.3 1/2020 test= 4  dexa scan-9/2019- normal\par  1.0 8/2019 test = 10\par  0.9 5/2019 testosterone= 5 \par  0.7 12/2018 \par  0.5 test 4- 9/5/18\par  0.6 12/2017\par

## 2021-10-12 NOTE — ASSESSMENT
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 Prostate CA dx'ed 1996- T1C, Valerie 6, left lobe, PSA 9.0 at diagnosis. pt treated with EBRT  biochemical recurrence in 2006 and started on Lupron depot 4 month.  casodex 50mg added on the 1/2020 visit for rising PSA but stopped on 12/2020 for continued rising PSA. Most recent PSA is a 5 and PSADT is 5-6 month\par \par Plan:\par -will check CT chest non contrast, BOne scan and CT Abd/pelvis with contrast if GFR is ok, had CKD on previous labs but GFR has been over 35\par -if M0 disease on conventional imaging or if M1   parker start him on Abiraterone with prednisone since he stated would not wish to be on Coumadin for his afib and we cant use DOACs with enzalutimide   based based class  medication( Appalutimide and darolutimdide as well)    granted most data  is for  enzalutimide based   treatment for M0 but data dose exist to support Abiraterone in this setting. \par -Recent phase 3 data did show  that addition of api to devang did improve median radiographic progression free survival but there was no difference in OS thus would not offer this approach at the moment especially since he is on DOACs\par -will check CBC, CMP, PSA, Testosterone and vitamin D level\par -RTC in 3 weeks post scans to discuss results and go over Abiratorone and predniosne based therapy in more detail.

## 2021-10-12 NOTE — CONSULT LETTER
[Dear  ___] : Dear ~AMBROSIO, [Consult Letter:] : I had the pleasure of evaluating your patient, [unfilled]. [Please see my note below.] : Please see my note below. [Consult Closing:] : Thank you very much for allowing me to participate in the care of this patient.  If you have any questions, please do not hesitate to contact me. [Sincerely,] : Sincerely, [FreeTextEntry3] : Hussain

## 2021-10-13 LAB
ALBUMIN SERPL ELPH-MCNC: 3.6 G/DL
ALP BLD-CCNC: 102 U/L
ALT SERPL-CCNC: 20 U/L
ANION GAP SERPL CALC-SCNC: 13 MMOL/L
AST SERPL-CCNC: 23 U/L
BILIRUB SERPL-MCNC: 0.4 MG/DL
BUN SERPL-MCNC: 18 MG/DL
CALCIUM SERPL-MCNC: 9 MG/DL
CHLORIDE SERPL-SCNC: 105 MMOL/L
CO2 SERPL-SCNC: 22 MMOL/L
CREAT SERPL-MCNC: 1.1 MG/DL
GLUCOSE SERPL-MCNC: 111 MG/DL
HCT VFR BLD CALC: 39.5 %
HGB BLD-MCNC: 12.9 G/DL
MCHC RBC-ENTMCNC: 29.7 PG
MCHC RBC-ENTMCNC: 32.7 G/DL
MCV RBC AUTO: 90.8 FL
PLATELET # BLD AUTO: 203 K/UL
PMV BLD: 8.7 FL
POTASSIUM SERPL-SCNC: 4.8 MMOL/L
PROT SERPL-MCNC: 6.7 G/DL
RBC # BLD: 4.35 M/UL
RBC # FLD: 15.1 %
SODIUM SERPL-SCNC: 140 MMOL/L
TESTOST SERPL-MCNC: <2.5 NG/DL
WBC # FLD AUTO: 6.9 K/UL

## 2021-10-15 LAB — PSA SERPL-MCNC: 7.13 NG/ML

## 2021-10-18 LAB — 25(OH)D3 SERPL-MCNC: 37.2 NG/ML

## 2021-10-27 ENCOUNTER — OUTPATIENT (OUTPATIENT)
Dept: OUTPATIENT SERVICES | Facility: HOSPITAL | Age: 86
LOS: 1 days | Discharge: HOME | End: 2021-10-27
Payer: MEDICARE

## 2021-10-27 ENCOUNTER — RESULT REVIEW (OUTPATIENT)
Age: 86
End: 2021-10-27

## 2021-10-27 DIAGNOSIS — Z95.1 PRESENCE OF AORTOCORONARY BYPASS GRAFT: Chronic | ICD-10-CM

## 2021-10-27 DIAGNOSIS — C61 MALIGNANT NEOPLASM OF PROSTATE: ICD-10-CM

## 2021-10-27 DIAGNOSIS — Z98.49 CATARACT EXTRACTION STATUS, UNSPECIFIED EYE: Chronic | ICD-10-CM

## 2021-10-27 PROCEDURE — 78803 RP LOCLZJ TUM SPECT 1 AREA: CPT | Mod: 26

## 2021-10-27 PROCEDURE — 78306 BONE IMAGING WHOLE BODY: CPT | Mod: 26

## 2021-11-01 ENCOUNTER — RESULT REVIEW (OUTPATIENT)
Age: 86
End: 2021-11-01

## 2021-11-01 ENCOUNTER — OUTPATIENT (OUTPATIENT)
Dept: OUTPATIENT SERVICES | Facility: HOSPITAL | Age: 86
LOS: 1 days | Discharge: HOME | End: 2021-11-01
Payer: MEDICARE

## 2021-11-01 DIAGNOSIS — C61 MALIGNANT NEOPLASM OF PROSTATE: ICD-10-CM

## 2021-11-01 DIAGNOSIS — Z95.1 PRESENCE OF AORTOCORONARY BYPASS GRAFT: Chronic | ICD-10-CM

## 2021-11-01 DIAGNOSIS — R97.21 RISING PSA FOLLOWING TREATMENT FOR MALIGNANT NEOPLASM OF PROSTATE: ICD-10-CM

## 2021-11-01 DIAGNOSIS — Z98.49 CATARACT EXTRACTION STATUS, UNSPECIFIED EYE: Chronic | ICD-10-CM

## 2021-11-01 PROCEDURE — 74177 CT ABD & PELVIS W/CONTRAST: CPT | Mod: 26,MH

## 2021-11-01 PROCEDURE — 71250 CT THORAX DX C-: CPT | Mod: 26

## 2021-11-02 ENCOUNTER — APPOINTMENT (OUTPATIENT)
Dept: UROLOGY | Facility: CLINIC | Age: 86
End: 2021-11-02
Payer: MEDICARE

## 2021-11-02 PROCEDURE — 51701 INSERT BLADDER CATHETER: CPT

## 2021-11-03 ENCOUNTER — APPOINTMENT (OUTPATIENT)
Dept: HEMATOLOGY ONCOLOGY | Facility: CLINIC | Age: 86
End: 2021-11-03
Payer: MEDICARE

## 2021-11-03 ENCOUNTER — LABORATORY RESULT (OUTPATIENT)
Age: 86
End: 2021-11-03

## 2021-11-03 ENCOUNTER — OUTPATIENT (OUTPATIENT)
Dept: OUTPATIENT SERVICES | Facility: HOSPITAL | Age: 86
LOS: 1 days | Discharge: HOME | End: 2021-11-03

## 2021-11-03 VITALS
SYSTOLIC BLOOD PRESSURE: 143 MMHG | TEMPERATURE: 97.1 F | RESPIRATION RATE: 16 BRPM | WEIGHT: 205 LBS | HEIGHT: 63 IN | BODY MASS INDEX: 36.32 KG/M2 | DIASTOLIC BLOOD PRESSURE: 67 MMHG | HEART RATE: 72 BPM

## 2021-11-03 DIAGNOSIS — Z95.1 PRESENCE OF AORTOCORONARY BYPASS GRAFT: Chronic | ICD-10-CM

## 2021-11-03 DIAGNOSIS — R97.21 RISING PSA FOLLOWING TREATMENT FOR MALIGNANT NEOPLASM OF PROSTATE: ICD-10-CM

## 2021-11-03 DIAGNOSIS — Z98.49 CATARACT EXTRACTION STATUS, UNSPECIFIED EYE: Chronic | ICD-10-CM

## 2021-11-03 DIAGNOSIS — I48.91 UNSPECIFIED ATRIAL FIBRILLATION: ICD-10-CM

## 2021-11-03 PROCEDURE — 99214 OFFICE O/P EST MOD 30 MIN: CPT

## 2021-11-03 RX ORDER — LACTOBACILLUS RHAMNOSUS GG 10B CELL
CAPSULE ORAL
Refills: 0 | Status: ACTIVE | COMMUNITY
Start: 2021-11-03

## 2021-11-03 RX ORDER — METOPROLOL TARTRATE 25 MG/1
25 TABLET, FILM COATED ORAL TWICE DAILY
Refills: 0 | Status: ACTIVE | COMMUNITY
Start: 2021-10-30

## 2021-11-03 RX ORDER — APIXABAN 5 MG/1
5 TABLET, FILM COATED ORAL TWICE DAILY
Refills: 0 | Status: ACTIVE | COMMUNITY

## 2021-11-03 RX ORDER — MULTIVITAMIN
TABLET ORAL DAILY
Refills: 0 | Status: ACTIVE | COMMUNITY
Start: 2021-11-03

## 2021-11-03 RX ORDER — METOPROLOL TARTRATE 50 MG/1
50 TABLET, FILM COATED ORAL TWICE DAILY
Qty: 60 | Refills: 0 | Status: DISCONTINUED | COMMUNITY
End: 2021-11-03

## 2021-11-03 RX ORDER — ASCORBIC ACID
100 CRYSTALS ORAL DAILY
Refills: 0 | Status: ACTIVE | COMMUNITY
Start: 2021-11-03

## 2021-11-03 RX ORDER — LEUPROLIDE ACETATE 7.5 MG
7.5 KIT INTRAMUSCULAR
Qty: 1 | Refills: 0 | Status: DISCONTINUED | OUTPATIENT
Start: 2020-09-03 | End: 2021-11-03

## 2021-11-03 RX ORDER — ASPIRIN 81 MG
81 TABLET, DELAYED RELEASE (ENTERIC COATED) ORAL DAILY
Refills: 0 | Status: ACTIVE | COMMUNITY

## 2021-11-05 LAB
ALBUMIN SERPL ELPH-MCNC: 3.8 G/DL
ALP BLD-CCNC: 118 U/L
ALT SERPL-CCNC: 45 U/L
ANION GAP SERPL CALC-SCNC: 17 MMOL/L
APTT BLD: 32.6 SEC
AST SERPL-CCNC: 29 U/L
BILIRUB SERPL-MCNC: 0.5 MG/DL
BUN SERPL-MCNC: 16 MG/DL
CALCIUM SERPL-MCNC: 8.9 MG/DL
CHLORIDE SERPL-SCNC: 104 MMOL/L
CO2 SERPL-SCNC: 19 MMOL/L
CREAT SERPL-MCNC: 1.3 MG/DL
GLUCOSE SERPL-MCNC: 99 MG/DL
HCT VFR BLD CALC: 41.5 %
HGB BLD-MCNC: 13.3 G/DL
INR PPP: 1.2 RATIO
MCHC RBC-ENTMCNC: 29.3 PG
MCHC RBC-ENTMCNC: 32 G/DL
MCV RBC AUTO: 91.4 FL
PLATELET # BLD AUTO: 202 K/UL
PMV BLD: 8.6 FL
POTASSIUM SERPL-SCNC: 4.7 MMOL/L
PROT SERPL-MCNC: 6.9 G/DL
PT BLD: 13.8 SEC
RBC # BLD: 4.54 M/UL
RBC # FLD: 15.5 %
SODIUM SERPL-SCNC: 140 MMOL/L
WBC # FLD AUTO: 8.12 K/UL

## 2021-11-08 DIAGNOSIS — C61 MALIGNANT NEOPLASM OF PROSTATE: ICD-10-CM

## 2021-11-08 DIAGNOSIS — K66.8 OTHER SPECIFIED DISORDERS OF PERITONEUM: ICD-10-CM

## 2021-11-08 NOTE — HISTORY OF PRESENT ILLNESS
[de-identified] : CC: I have prostate cancer \par \par He is here at the request of Dr. Carlisle and Loy\par \par History obtained form patient. Daughter and Dr. Carlisle and Loy notes\par \par Briefly this is an 89 year old male with history of CAD s/p CABG, afib on apixaban and rest as bellow as well as prostate cancer. His prostate cancer history \par Prostate CA dx'ed 1996- T1C, john 6, left lobe, PSA 9.0 at diagnosis. pt treated with EBRT  biochemical recurrence in 2006 and started on Lupron depot 4 month- previously followed by Dr. Sanchez- per pts daughter "scans" were done and it did not show metastatic dz\par PSA has been on Lupron alone- casodex 50mg added on the 1/2020 visit for rising PSA but stopped on 12/2020 for continued rising PSA.\par \par \par  PSA 5.3 9/2021 testosterone <10 PSADT- 5.3 months\par  3.1 6/2021 \par  2.6 3/2021 testosterone- 7.0 \par  1.7 11/2020 - Casodex stopped \par  1.1 9/2020 - on CAB test- Pending\par  0.9 5/2020- on CAB test- 5\par  1.3 1/2020 test= 4  dexa scan-9/2019- normal\par  1.0 8/2019 test = 10\par  0.9 5/2019 testosterone= 5 \par  0.7 12/2018 \par  0.5 test 4- 9/5/18\par  0.6 12/2017 [de-identified] : 11/3/21\par Patient is here for a follow-up visit for prostate cancer, accompanied by family member.  He is feeling well with no new complaints.  Reviewed most recent labwork, which shows PSA up to 7.13ng/mL.  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  Bone Scan shows no evidence of metastatic bone disease.  He takes Eliquis 2.5mg BiD.  Patient denies fever, chills, nausea, vomiting, dyspnea, diarrhea, new bony pain or bleeding.   \par NM Bone Scan (10.27.2021) Impression:No definite evidence for metastatic bone disease.  Probably degenerative changes in the cervical and lumbar spine and peripheral joints\par CT C/A/P (11.1.2021) IMPRESSION:1.  Spiculated 4.1 x 3.3 x 5.7 cm mesenteric mass with coarse calcifications, new from CT of 8/31/2006. 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 Further workup is needed. 2.  Bladder contracted around Silva catheter. Bladder wall thickening. Correlate with urinalysis.  Subcentimeter pulmonary nodules as above, nonspecific in nature measuring up to 5 mm. These were not definitively seen on prior exam, but is difficult to compare due to differences in technique

## 2021-11-08 NOTE — END OF VISIT
[FreeTextEntry3] : I was physically present for the key portions of the evaluation and management service provided.  I agree with the history and physical, and plan which I have reviewed and edited where appropriate.\par \par He returns for follow-up today.  His CT chest abdomen pelvis and bone scan did not demonstrate distant metastases but  mesenteric mass suspicious for carcinoid tumor was incedently found.\par I reviewed the imaging and am not convinced that an IR biopsy is possible but nevertheless we will request a biopsy of the lesion.  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  They are aware of risks of treatment as well as benefits and will see us back in 1 month

## 2021-11-08 NOTE — REVIEW OF SYSTEMS
[Negative] : Allergic/Immunologic [Fever] : no fever [Chills] : no chills [Joint Pain] : no joint pain [Easy Bleeding] : no tendency for easy bleeding

## 2021-12-03 ENCOUNTER — APPOINTMENT (OUTPATIENT)
Dept: HEMATOLOGY ONCOLOGY | Facility: CLINIC | Age: 86
End: 2021-12-03

## 2021-12-07 ENCOUNTER — APPOINTMENT (OUTPATIENT)
Dept: UROLOGY | Facility: CLINIC | Age: 86
End: 2021-12-07
Payer: MEDICARE

## 2021-12-07 PROCEDURE — 51703 INSERT BLADDER CATH COMPLEX: CPT

## 2021-12-09 LAB — PSA SERPL-MCNC: 3.9 NG/ML

## 2021-12-14 ENCOUNTER — NON-APPOINTMENT (OUTPATIENT)
Age: 86
End: 2021-12-14

## 2021-12-21 ENCOUNTER — APPOINTMENT (OUTPATIENT)
Dept: UROLOGY | Facility: CLINIC | Age: 86
End: 2021-12-21
Payer: MEDICARE

## 2021-12-21 PROCEDURE — 96402 CHEMO HORMON ANTINEOPL SQ/IM: CPT

## 2022-01-12 ENCOUNTER — APPOINTMENT (OUTPATIENT)
Dept: UROLOGY | Facility: CLINIC | Age: 87
End: 2022-01-12
Payer: MEDICARE

## 2022-01-12 VITALS — WEIGHT: 205 LBS | HEIGHT: 63 IN | BODY MASS INDEX: 36.32 KG/M2

## 2022-01-12 PROCEDURE — 51703 INSERT BLADDER CATH COMPLEX: CPT

## 2022-03-01 ENCOUNTER — NON-APPOINTMENT (OUTPATIENT)
Age: 87
End: 2022-03-01

## 2022-03-01 ENCOUNTER — APPOINTMENT (OUTPATIENT)
Dept: UROLOGY | Facility: CLINIC | Age: 87
End: 2022-03-01
Payer: MEDICARE

## 2022-03-01 VITALS — BODY MASS INDEX: 32.95 KG/M2 | HEIGHT: 66 IN | WEIGHT: 205 LBS

## 2022-03-01 LAB — PSA SERPL-MCNC: 1.88 NG/ML

## 2022-03-01 PROCEDURE — 51703 INSERT BLADDER CATH COMPLEX: CPT

## 2022-03-14 NOTE — H&P PST ADULT - VASCULAR
LVM confirming portal cancellation of appt with Aylin Rahul on 3/15/22 and to call back if we can assist with R/S of appt.   
detailed exam

## 2022-03-21 ENCOUNTER — OUTPATIENT (OUTPATIENT)
Dept: OUTPATIENT SERVICES | Facility: HOSPITAL | Age: 87
LOS: 1 days | Discharge: HOME | End: 2022-03-21

## 2022-03-21 ENCOUNTER — LABORATORY RESULT (OUTPATIENT)
Age: 87
End: 2022-03-21

## 2022-03-21 ENCOUNTER — APPOINTMENT (OUTPATIENT)
Dept: HEMATOLOGY ONCOLOGY | Facility: CLINIC | Age: 87
End: 2022-03-21
Payer: MEDICARE

## 2022-03-21 VITALS
RESPIRATION RATE: 16 BRPM | BODY MASS INDEX: 33.75 KG/M2 | HEIGHT: 66 IN | SYSTOLIC BLOOD PRESSURE: 143 MMHG | DIASTOLIC BLOOD PRESSURE: 64 MMHG | TEMPERATURE: 98.8 F | WEIGHT: 210 LBS | HEART RATE: 87 BPM

## 2022-03-21 DIAGNOSIS — Z98.49 CATARACT EXTRACTION STATUS, UNSPECIFIED EYE: Chronic | ICD-10-CM

## 2022-03-21 DIAGNOSIS — Z95.1 PRESENCE OF AORTOCORONARY BYPASS GRAFT: Chronic | ICD-10-CM

## 2022-03-21 LAB
HCT VFR BLD CALC: 41 %
HGB BLD-MCNC: 13.5 G/DL
MCHC RBC-ENTMCNC: 30.3 PG
MCHC RBC-ENTMCNC: 32.9 G/DL
MCV RBC AUTO: 91.9 FL
PLATELET # BLD AUTO: 190 K/UL
PMV BLD: 8.7 FL
RBC # BLD: 4.46 M/UL
RBC # FLD: 15.8 %
WBC # FLD AUTO: 8.88 K/UL

## 2022-03-21 PROCEDURE — 99214 OFFICE O/P EST MOD 30 MIN: CPT | Mod: GC

## 2022-03-22 ENCOUNTER — APPOINTMENT (OUTPATIENT)
Dept: UROLOGY | Facility: CLINIC | Age: 87
End: 2022-03-22
Payer: MEDICARE

## 2022-03-22 VITALS — HEIGHT: 66 IN | BODY MASS INDEX: 33.75 KG/M2 | WEIGHT: 210 LBS

## 2022-03-22 DIAGNOSIS — K66.8 OTHER SPECIFIED DISORDERS OF PERITONEUM: ICD-10-CM

## 2022-03-22 DIAGNOSIS — C61 MALIGNANT NEOPLASM OF PROSTATE: ICD-10-CM

## 2022-03-22 LAB
ALBUMIN SERPL ELPH-MCNC: 3.7 G/DL
ALP BLD-CCNC: 99 U/L
ALT SERPL-CCNC: 19 U/L
ANION GAP SERPL CALC-SCNC: 13 MMOL/L
AST SERPL-CCNC: 20 U/L
BILIRUB SERPL-MCNC: 0.5 MG/DL
BUN SERPL-MCNC: 20 MG/DL
CALCIUM SERPL-MCNC: 8.9 MG/DL
CHLORIDE SERPL-SCNC: 102 MMOL/L
CO2 SERPL-SCNC: 25 MMOL/L
CREAT SERPL-MCNC: 1.3 MG/DL
EGFR: 52 ML/MIN/1.73M2
GLUCOSE SERPL-MCNC: 152 MG/DL
POTASSIUM SERPL-SCNC: 5.2 MMOL/L
PROT SERPL-MCNC: 6.3 G/DL
PSA SERPL-MCNC: 1.55 NG/ML
SODIUM SERPL-SCNC: 140 MMOL/L
TESTOST SERPL-MCNC: <2.5 NG/DL

## 2022-03-22 PROCEDURE — 96402 CHEMO HORMON ANTINEOPL SQ/IM: CPT

## 2022-03-22 PROCEDURE — 99213 OFFICE O/P EST LOW 20 MIN: CPT | Mod: 25

## 2022-03-22 NOTE — END OF VISIT
[] : Fellow [FreeTextEntry3] : He returns for follow-up today.  He left to [place] and did not undergo the biopsy for the mesenteric mass.\par \par Plan\par #In regards to his castration resistant prostate cancer he is doing well on abiratorone/prednisoen  and ADT and PSA has been trending down and does not report any new symptoms he is tolerating medication well\par -We will repeat blood work today with PSA and liver enzymes and in the future we will obtain a DEXA scan\par \par #In regards to his mesenteric mass we decided to repeat another CAT scan if its not changing or shrinking (if related to prostate cancer?)  We decided to omit a biopsy given his age and his preference\par -If there is growth of the mass he is willing to undergo a biopsy.\par \par Will call with result of CT and blood work

## 2022-03-22 NOTE — HISTORY OF PRESENT ILLNESS
[de-identified] : CC: I have prostate cancer \par \par He is here at the request of Dr. Carlisle and Loy\par \par History obtained form patient. Daughter and Dr. Carlisle and Loy notes\par \par Briefly this is an 89 year old male with history of CAD s/p CABG, afib on apixaban and rest as bellow as well as prostate cancer. His prostate cancer history \par Prostate CA dx'ed 1996- T1C, john 6, left lobe, PSA 9.0 at diagnosis. pt treated with EBRT  biochemical recurrence in 2006 and started on Lupron depot 4 month- previously followed by Dr. Sanchez- per pts daughter "scans" were done and it did not show metastatic dz\par PSA has been on Lupron alone- casodex 50mg added on the 1/2020 visit for rising PSA but stopped on 12/2020 for continued rising PSA.\par \par \par  PSA 5.3 9/2021 testosterone <10 PSADT- 5.3 months\par  3.1 6/2021 \par  2.6 3/2021 testosterone- 7.0 \par  1.7 11/2020 - Casodex stopped \par  1.1 9/2020 - on CAB test- Pending\par  0.9 5/2020- on CAB test- 5\par  1.3 1/2020 test= 4  dexa scan-9/2019- normal\par  1.0 8/2019 test = 10\par  0.9 5/2019 testosterone= 5 \par  0.7 12/2018 \par  0.5 test 4- 9/5/18\par  0.6 12/2017 [de-identified] : 11/3/21\par Patient is here for a follow-up visit for prostate cancer, accompanied by family member.  He is feeling well with no new complaints.  Reviewed most recent labwork, which shows PSA up to 7.13ng/mL.  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  Bone Scan shows no evidence of metastatic bone disease.  He takes Eliquis 2.5mg BiD.  Patient denies fever, chills, nausea, vomiting, dyspnea, diarrhea, new bony pain or bleeding.   \par NM Bone Scan (10.27.2021) Impression:No definite evidence for metastatic bone disease.  Probably degenerative changes in the cervical and lumbar spine and peripheral joints\par CT C/A/P (11.1.2021) IMPRESSION:1.  Spiculated 4.1 x 3.3 x 5.7 cm mesenteric mass with coarse calcifications, new from CT of 8/31/2006. 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 Further workup is needed. 2.  Bladder contracted around Silva catheter. Bladder wall thickening. Correlate with urinalysis.  Subcentimeter pulmonary nodules as above, nonspecific in nature measuring up to 5 mm. These were not definitively seen on prior exam, but is difficult to compare due to differences in technique\par \par 3/21/22\par Pt returns for f/u visit today with his daughter. He continues to take abiraterone with prednisone and tolerating it well. Last PSA was 1.88 on 2/28/22. Pt denies any bone pains , but twisted his left leg when he was attempting to stand from sitting position, reports he has pain in his left leg , its improving now , he is using a walker to ambulate.Reports pain improves with motrin.On last visit he was recommended to get a biopsy of mesentaric mass seen on CT abdomen , pt went to [place] and it was never done. Today he reports feeling fine , denies any abdominal pain or discomfort. Pt is receiving Lupron every 3 months with Dr Carlisle.

## 2022-03-22 NOTE — ASSESSMENT
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 Prostate CA dx'ed 1996- T1C, Valerie 6, left lobe, PSA 9.0 at diagnosis. pt treated with EBRT  biochemical recurrence in 2006 and started on Lupron depot 4 month.  casodex 50mg added on the 1/2020 visit for rising PSA but stopped on 12/2020 for continued rising PSA. 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 \par - Bone Scan from 10.27.2021 shows no definite evidence for metastatic bone disease\par -For mesenteric mass will recommend repeat imaging since last imaging was in November and based on im aging results will make recommendation about further management. \par - he knows to HOLD Eliquis 3 days prior to any procedure and resume 24 hours following procedure as long as no bleeding complications\par - since M0 disease, he is on Abiraterone with prednisone since he stated would not wish to be on Coumadin for his afib and we cant use DOACs with enzalutimide   based based class  medication ( Appalutimide and darolutimdide as well)    granted most data  is for  enzalutimide based   treatment for M0 but data dose exist to support Abiraterone in this setting. \par - Recent phase 3 data did show  that addition of api to devang did improve median radiographic progression free survival but there was no difference in OS thus would not offer this approach at the moment especially since he is on DOACs\par -continue with Abiraterone 1000mg daily on an empty stomach + prednisone 5mg daily; refill sent.  Side effects discussed.  Written information given.  \par - will continue to monitor: CBC, CMP, PSA, Testosterone level\par Repeat imaging ordered .\par \par RTC in 3 months.\par \par Pt was seen and examined with Dr Michel .

## 2022-04-01 ENCOUNTER — RESULT REVIEW (OUTPATIENT)
Age: 87
End: 2022-04-01

## 2022-04-01 ENCOUNTER — OUTPATIENT (OUTPATIENT)
Dept: OUTPATIENT SERVICES | Facility: HOSPITAL | Age: 87
LOS: 1 days | Discharge: HOME | End: 2022-04-01
Payer: MEDICARE

## 2022-04-01 DIAGNOSIS — C61 MALIGNANT NEOPLASM OF PROSTATE: ICD-10-CM

## 2022-04-01 DIAGNOSIS — Z95.1 PRESENCE OF AORTOCORONARY BYPASS GRAFT: Chronic | ICD-10-CM

## 2022-04-01 DIAGNOSIS — Z98.49 CATARACT EXTRACTION STATUS, UNSPECIFIED EYE: Chronic | ICD-10-CM

## 2022-04-01 PROCEDURE — 74177 CT ABD & PELVIS W/CONTRAST: CPT | Mod: 26

## 2022-04-05 ENCOUNTER — APPOINTMENT (OUTPATIENT)
Dept: UROLOGY | Facility: CLINIC | Age: 87
End: 2022-04-05
Payer: MEDICARE

## 2022-04-05 VITALS — BODY MASS INDEX: 33.75 KG/M2 | WEIGHT: 210 LBS | HEIGHT: 66 IN

## 2022-04-05 PROCEDURE — 51703 INSERT BLADDER CATH COMPLEX: CPT

## 2022-05-17 ENCOUNTER — APPOINTMENT (OUTPATIENT)
Dept: UROLOGY | Facility: CLINIC | Age: 87
End: 2022-05-17
Payer: MEDICARE

## 2022-05-17 PROCEDURE — 51703 INSERT BLADDER CATH COMPLEX: CPT

## 2022-05-27 RX ORDER — CIPROFLOXACIN HYDROCHLORIDE 500 MG/1
500 TABLET, FILM COATED ORAL TWICE DAILY
Qty: 14 | Refills: 0 | Status: DISCONTINUED | COMMUNITY
Start: 2021-12-14 | End: 2022-05-27

## 2022-05-27 RX ORDER — CIPROFLOXACIN HYDROCHLORIDE 250 MG/1
250 TABLET, FILM COATED ORAL TWICE DAILY
Qty: 28 | Refills: 0 | Status: DISCONTINUED | COMMUNITY
Start: 2021-12-14 | End: 2022-05-27

## 2022-06-28 ENCOUNTER — APPOINTMENT (OUTPATIENT)
Dept: UROLOGY | Facility: CLINIC | Age: 87
End: 2022-06-28

## 2022-06-28 DIAGNOSIS — R29.2 ABNORMAL REFLEX: ICD-10-CM

## 2022-06-28 PROCEDURE — 96402 CHEMO HORMON ANTINEOPL SQ/IM: CPT

## 2022-06-28 PROCEDURE — 99212 OFFICE O/P EST SF 10 MIN: CPT | Mod: 25

## 2022-07-10 LAB
PSA SERPL-MCNC: 0.99 NG/ML
TESTOST SERPL-MCNC: <2.5 NG/DL

## 2022-07-22 ENCOUNTER — RX RENEWAL (OUTPATIENT)
Age: 87
End: 2022-07-22

## 2022-07-27 ENCOUNTER — APPOINTMENT (OUTPATIENT)
Dept: UROLOGY | Facility: CLINIC | Age: 87
End: 2022-07-27

## 2022-07-27 PROCEDURE — 51702 INSERT TEMP BLADDER CATH: CPT

## 2022-07-27 RX ORDER — PREDNISONE 50 MG/1
50 TABLET ORAL
Qty: 5 | Refills: 0 | Status: ACTIVE | COMMUNITY
Start: 2022-05-12

## 2022-07-27 RX ORDER — BENZONATATE 200 MG/1
200 CAPSULE ORAL
Qty: 30 | Refills: 0 | Status: ACTIVE | COMMUNITY
Start: 2022-04-27

## 2022-07-27 RX ORDER — ASPIRIN 81 MG/1
81 TABLET, COATED ORAL
Qty: 90 | Refills: 0 | Status: ACTIVE | COMMUNITY
Start: 2022-06-29

## 2022-07-27 RX ORDER — LEVOFLOXACIN 500 MG/1
500 TABLET, FILM COATED ORAL
Qty: 3 | Refills: 0 | Status: ACTIVE | COMMUNITY
Start: 2022-05-22

## 2022-07-27 RX ORDER — CLOTRIMAZOLE AND BETAMETHASONE DIPROPIONATE 10; .5 MG/G; MG/G
1-0.05 CREAM TOPICAL
Qty: 45 | Refills: 0 | Status: ACTIVE | COMMUNITY
Start: 2022-06-22

## 2022-07-27 RX ORDER — ALBUTEROL SULFATE 90 UG/1
108 (90 BASE) INHALANT RESPIRATORY (INHALATION)
Qty: 7 | Refills: 0 | Status: ACTIVE | COMMUNITY
Start: 2022-05-12

## 2022-09-07 LAB — PSA SERPL-MCNC: 0.81 NG/ML

## 2022-09-08 ENCOUNTER — APPOINTMENT (OUTPATIENT)
Dept: UROLOGY | Facility: CLINIC | Age: 87
End: 2022-09-08

## 2022-09-08 VITALS — HEIGHT: 66 IN | BODY MASS INDEX: 32.95 KG/M2 | WEIGHT: 205 LBS

## 2022-09-08 PROCEDURE — 51703 INSERT BLADDER CATH COMPLEX: CPT

## 2022-09-14 ENCOUNTER — APPOINTMENT (OUTPATIENT)
Dept: UROLOGY | Facility: CLINIC | Age: 87
End: 2022-09-14

## 2022-09-14 PROCEDURE — 96402 CHEMO HORMON ANTINEOPL SQ/IM: CPT

## 2022-09-28 ENCOUNTER — APPOINTMENT (OUTPATIENT)
Dept: UROLOGY | Facility: CLINIC | Age: 87
End: 2022-09-28

## 2022-09-28 DIAGNOSIS — N39.0 URINARY TRACT INFECTION, SITE NOT SPECIFIED: ICD-10-CM

## 2022-09-28 PROCEDURE — 51703 INSERT BLADDER CATH COMPLEX: CPT

## 2022-10-04 LAB — URINE CULTURE <10: ABNORMAL

## 2022-10-19 ENCOUNTER — APPOINTMENT (OUTPATIENT)
Dept: UROLOGY | Facility: CLINIC | Age: 87
End: 2022-10-19

## 2022-10-19 PROCEDURE — 51703 INSERT BLADDER CATH COMPLEX: CPT

## 2022-11-03 NOTE — PATIENT PROFILE ADULT - EQUIPMENT CURRENTLY USED AT HOME
no

## 2022-11-23 ENCOUNTER — APPOINTMENT (OUTPATIENT)
Dept: UROLOGY | Facility: CLINIC | Age: 87
End: 2022-11-23

## 2022-11-23 PROCEDURE — 51702 INSERT TEMP BLADDER CATH: CPT

## 2022-11-28 ENCOUNTER — OUTPATIENT (OUTPATIENT)
Dept: OUTPATIENT SERVICES | Facility: HOSPITAL | Age: 87
LOS: 1 days | End: 2022-11-28

## 2022-11-28 ENCOUNTER — APPOINTMENT (OUTPATIENT)
Dept: HEMATOLOGY ONCOLOGY | Facility: CLINIC | Age: 87
End: 2022-11-28

## 2022-11-28 VITALS
HEIGHT: 65 IN | BODY MASS INDEX: 36.32 KG/M2 | TEMPERATURE: 98.7 F | HEART RATE: 90 BPM | DIASTOLIC BLOOD PRESSURE: 78 MMHG | WEIGHT: 218 LBS | SYSTOLIC BLOOD PRESSURE: 151 MMHG | RESPIRATION RATE: 16 BRPM

## 2022-11-28 DIAGNOSIS — Z95.1 PRESENCE OF AORTOCORONARY BYPASS GRAFT: Chronic | ICD-10-CM

## 2022-11-28 DIAGNOSIS — K66.8 OTHER SPECIFIED DISORDERS OF PERITONEUM: ICD-10-CM

## 2022-11-28 DIAGNOSIS — Z98.49 CATARACT EXTRACTION STATUS, UNSPECIFIED EYE: Chronic | ICD-10-CM

## 2022-11-28 LAB
BASOPHILS # BLD AUTO: 0.05 K/UL
BASOPHILS NFR BLD AUTO: 0.6 %
EOSINOPHIL # BLD AUTO: 0.07 K/UL
EOSINOPHIL NFR BLD AUTO: 0.8 %
HCT VFR BLD CALC: 42 %
HGB BLD-MCNC: 13.5 G/DL
IMM GRANULOCYTES NFR BLD AUTO: 0.4 %
LYMPHOCYTES # BLD AUTO: 0.68 K/UL
LYMPHOCYTES NFR BLD AUTO: 7.5 %
MAN DIFF?: NORMAL
MCHC RBC-ENTMCNC: 29.9 PG
MCHC RBC-ENTMCNC: 32.1 G/DL
MCV RBC AUTO: 92.9 FL
MONOCYTES # BLD AUTO: 0.49 K/UL
MONOCYTES NFR BLD AUTO: 5.4 %
NEUTROPHILS # BLD AUTO: 7.76 K/UL
NEUTROPHILS NFR BLD AUTO: 85.3 %
PLATELET # BLD AUTO: 185 K/UL
RBC # BLD: 4.52 M/UL
RBC # FLD: 14.4 %
WBC # FLD AUTO: 9.09 K/UL

## 2022-11-28 PROCEDURE — 99214 OFFICE O/P EST MOD 30 MIN: CPT

## 2022-11-28 NOTE — REVIEW OF SYSTEMS
[Negative] : Allergic/Immunologic [Fever] : no fever [Chills] : no chills [Lower Ext Edema] : lower extremity edema [Joint Pain] : no joint pain [Easy Bleeding] : no tendency for easy bleeding

## 2022-11-28 NOTE — ASSESSMENT
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 Prostate CA dx'ed 1996- T1C, Valerie 6, left lobe, PSA 9.0 at diagnosis. pt treated with EBRT  biochemical recurrence in 2006 and started on Lupron depot 4 month.  casodex 50mg added on the 1/2020 visit for rising PSA but stopped on 12/2020 for continued rising PSA. Most recent PSA is a 5 and PSADT is 5-6 month\par PSA was 1.88 on 2/28/22 and on 9/6/2022 was 0.81 and is on ADT and Abiraterone and prednisone  \par \par #Mesenteric mass , incidental finding\par -stable on CT from 4/2022\par \par Plan:\par --continue with Abiraterone 1000mg daily on an empty stomach + prednisone 5mg daily; refill sent.  Side effects discussed.  Written information given.  \par - he knows to HOLD Eliquis 3 days prior to any procedure and resume 24 hours following procedure as long as no bleeding complications\par - since M0 disease, he is on Abiraterone with prednisone since he stated would not wish to be on Coumadin for his afib and we cant use DOACs ( granted possibly Pradaxa) can be used  with enzalutimide   based based class  medication ( Appalutimide and darolutimdide as well)    granted most data  is for  enzalutimide based   treatment for M0 but data dose exist to support Abiraterone in this setting. \par - Recent phase 3 data did show  that addition of api to devang did improve median radiographic progression free survival but there was no difference in OS thus would not offer this approach at the moment especially since he is on DOACs\par - will continue to monitor: CBC, CMP, PSA, Testosterone level ordered today \par -will check CT in May 2023 he declined to have CT abdomen done today for mesenteric mass \par \par RTC in  May 2023

## 2022-11-28 NOTE — PHYSICAL EXAM
[Restricted in physically strenuous activity but ambulatory and able to carry out work of a light or sedentary nature] : Status 1- Restricted in physically strenuous activity but ambulatory and able to carry out work of a light or sedentary nature, e.g., light house work, office work [Normal] : clear to auscultation bilaterally, no dullness, no wheezing [de-identified] : Bilateral edema in legs, chornic

## 2022-11-29 DIAGNOSIS — K66.8 OTHER SPECIFIED DISORDERS OF PERITONEUM: ICD-10-CM

## 2022-11-29 DIAGNOSIS — C61 MALIGNANT NEOPLASM OF PROSTATE: ICD-10-CM

## 2022-11-29 LAB
ALBUMIN SERPL ELPH-MCNC: 3.8 G/DL
ALP BLD-CCNC: 106 U/L
ALT SERPL-CCNC: 30 U/L
ANION GAP SERPL CALC-SCNC: 13 MMOL/L
AST SERPL-CCNC: 37 U/L
BILIRUB SERPL-MCNC: 0.5 MG/DL
BUN SERPL-MCNC: 19 MG/DL
CALCIUM SERPL-MCNC: 8.8 MG/DL
CHLORIDE SERPL-SCNC: 100 MMOL/L
CO2 SERPL-SCNC: 24 MMOL/L
CREAT SERPL-MCNC: 1.4 MG/DL
EGFR: 48 ML/MIN/1.73M2
GLUCOSE SERPL-MCNC: 202 MG/DL
POTASSIUM SERPL-SCNC: 4.6 MMOL/L
PROT SERPL-MCNC: 6.2 G/DL
PSA SERPL-MCNC: 0.87 NG/ML
SODIUM SERPL-SCNC: 137 MMOL/L
TESTOST SERPL-MCNC: <2.5 NG/DL

## 2022-12-20 ENCOUNTER — APPOINTMENT (OUTPATIENT)
Dept: UROLOGY | Facility: CLINIC | Age: 87
End: 2022-12-20

## 2022-12-20 DIAGNOSIS — N31.2 FLACCID NEUROPATHIC BLADDER, NOT ELSEWHERE CLASSIFIED: ICD-10-CM

## 2022-12-20 DIAGNOSIS — R97.21 RISING PSA FOLLOWING TREATMENT FOR MALIGNANT NEOPLASM OF PROSTATE: ICD-10-CM

## 2022-12-20 PROCEDURE — 99213 OFFICE O/P EST LOW 20 MIN: CPT | Mod: 25

## 2022-12-20 PROCEDURE — 96402 CHEMO HORMON ANTINEOPL SQ/IM: CPT

## 2023-01-24 ENCOUNTER — APPOINTMENT (OUTPATIENT)
Dept: UROLOGY | Facility: CLINIC | Age: 88
End: 2023-01-24
Payer: MEDICARE

## 2023-01-24 PROCEDURE — 51702 INSERT TEMP BLADDER CATH: CPT

## 2023-02-28 ENCOUNTER — APPOINTMENT (OUTPATIENT)
Dept: UROLOGY | Facility: CLINIC | Age: 88
End: 2023-02-28

## 2023-03-02 ENCOUNTER — APPOINTMENT (OUTPATIENT)
Dept: UROLOGY | Facility: CLINIC | Age: 88
End: 2023-03-02
Payer: MEDICARE

## 2023-03-02 PROCEDURE — 51702 INSERT TEMP BLADDER CATH: CPT

## 2023-03-21 ENCOUNTER — APPOINTMENT (OUTPATIENT)
Dept: UROLOGY | Facility: CLINIC | Age: 88
End: 2023-03-21
Payer: MEDICARE

## 2023-03-21 PROCEDURE — 96402 CHEMO HORMON ANTINEOPL SQ/IM: CPT

## 2023-04-18 ENCOUNTER — APPOINTMENT (OUTPATIENT)
Dept: UROLOGY | Facility: CLINIC | Age: 88
End: 2023-04-18
Payer: MEDICARE

## 2023-04-18 PROCEDURE — 51702 INSERT TEMP BLADDER CATH: CPT

## 2023-05-01 NOTE — DIETITIAN INITIAL EVALUATION ADULT. - WEIGHT IN KG
Occupational Therapy    Visit Type: treatment  Precautions:  Medical precautions: 4.15: transfer from [facility] (from out of town), rolled out of bed --> stroke alert --> L PCA occlusion --> s/p mechanical thrombectomy > NCCU. PT/OT orders; as tolerated.   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        Second attempt to see patient today. Pt very agitated/restless earlier. At this attempt family at bs. and pt appearing calmer.   Lines:     Basic: NG, telemetry and capped IV      Lines in chart and on patient reviewed, precautions maintained throughout session.  Safety Measures: restraints and bed alarm  SUBJECTIVE  Patient agreed to participate in therapy this date.    Pt mumbling   Patient / Family Goal: return to previous functional status, maximize function and return home    Pain   RN informed on pain level  Does not appear in distress      OBJECTIVE     Cognitive Status   Orientation    - Oriented to: person and place   - Disoriented to: time and situation  Functional Communication   - Overall Status: impaired   - Forms of Communication: expressive deficits and receptive deficits      Sitting Balance  (SHAHAB = base of support)  Static      - Trial 1 details: maximal assist  Pt constantly attempting to return to supine while sitting at EOB        Bed Mobility  - Rolling left: maximal assist  - Rolling right: maximal assist  - Supine to sit: maximal assist  - Sit to supine: moderate assist      Activities of Daily Living (ADLs)  Grooming/Oral Hygiene:   - Grooming assist: total assist - dependent   Lower Body Dressing:   - Footwear:       - Assistance: total assist - dependent   Toileting:   - Toilet transfer:        - Assist: not attempted due to not medically appropriate or safe  - Assist: total assist - dependent   Interventions    Training provided: activity tolerance, ADL training, balance retraining, safety training and transfer  training  Facilitated bed mobility including rolling and supine<>sit for toileting and linen change. Attempted to facilitate EOB ADLs, pt with no command following, no carryover of hand over hand instruction, constantly attempting to return to supine. Sit>stand with max A and promptly returned to sitting, unable to progress to chair.   Skilled input: verbal instruction/cues, tactile instruction/cues and facilitation  Verbal Consent: Writer verbally educated and received verbal consent for hand placement, positioning of patient, and techniques to be performed today from patient for therapist position for techniques as described above and how they are pertinent to the patient's plan of care.         ASSESSMENT  Impairments: activity tolerance, bed mobility, balance and safety awareness  Functional Limitations: bed mobility, functional mobility, grooming, toileting, dressing, showering, functional transfers, bathing and participating in meaningful/purposeful activities  Recommended Consults: OT: Physical Medicine and Rehabilitation Physician    Discharge Recommendations:  Recommendations for Discharge: OT IL: Patient is appropriate for daily Occupational Therapy  PT/OT Mobility Equipment for Discharge: TBD  PT/OT ADL Equipment for Discharge: tba     Therapy Diagnosis:   Decreased ability to perform self care tasks and functional transfers.         Remains limited by impaired communication as well as apraxia   Progress: progressing toward goals    • Skilled therapy is required to address these limitations in attempt to maximize the patient's independence.    Education:   - Present and ready to learn: patient's family  Education provided during session:  - Role of acute OT  Activity progression  Fall and safety precautions while in hospital  - Results of above outlined education: Verbalizes understanding    Patient at End of Session:   Location: in bed  Safety measures: alarm system in place/re-engaged, equipment intact,  lines intact and restraints in place  Handoff to: nurse and nurse assistant    PLAN  Suggestions for next session as indicated: OT Frequency: 3-5 x per week  Frequency Comments: cva 1/3-5 M ECF 5.1    A minimum of 8 minutes per session x 1 week in the acute setting.    Interventions: activity tolerance training, ADL retraining, balance, patient education, transfer training, therapeutic exercise, therapeutic activity, IADL, functional transfer training, energy conservation, bed mobility training, compensatory techniques, compensatory technique education and safety training  Agreement to plan and goals: patient unable to agree with goals and treatment plan and family/significant other/caregiver agrees      GOALS  Review Date: 5/1/2023  Long Term Goals: (to be met by time of discharge from hospital)  Grooming: Patient will complete grooming tasks minimal assist. Status: progressing/ongoing  Upper body dressing: Patient will complete upper body dressing minimal assist. Status: progressing/ongoing  Lower body dressing: Patient will complete lower body dressing maximal assist. Status: progressing/ongoing        Documented in the chart in the following areas: Assessment. Plan.      Therapy procedure time and total treatment time can be found documented on the Time Entry flowsheet   79.2 78

## 2023-05-24 ENCOUNTER — APPOINTMENT (OUTPATIENT)
Dept: UROLOGY | Facility: CLINIC | Age: 88
End: 2023-05-24
Payer: MEDICARE

## 2023-05-24 ENCOUNTER — LABORATORY RESULT (OUTPATIENT)
Age: 88
End: 2023-05-24

## 2023-05-24 PROCEDURE — 51702 INSERT TEMP BLADDER CATH: CPT

## 2023-05-25 LAB
PSA SERPL-MCNC: 1.03 NG/ML
TESTOST SERPL-MCNC: <2.5 NG/DL

## 2023-06-02 ENCOUNTER — APPOINTMENT (OUTPATIENT)
Dept: HEMATOLOGY ONCOLOGY | Facility: CLINIC | Age: 88
End: 2023-06-02

## 2023-06-27 ENCOUNTER — APPOINTMENT (OUTPATIENT)
Dept: UROLOGY | Facility: CLINIC | Age: 88
End: 2023-06-27

## 2023-06-27 ENCOUNTER — APPOINTMENT (OUTPATIENT)
Dept: UROLOGY | Facility: CLINIC | Age: 88
End: 2023-06-27
Payer: MEDICARE

## 2023-06-27 ENCOUNTER — NON-APPOINTMENT (OUTPATIENT)
Age: 88
End: 2023-06-27

## 2023-06-27 PROCEDURE — 96402 CHEMO HORMON ANTINEOPL SQ/IM: CPT

## 2023-06-27 PROCEDURE — 51702 INSERT TEMP BLADDER CATH: CPT

## 2023-06-27 RX ORDER — CIPROFLOXACIN HYDROCHLORIDE 500 MG/1
500 TABLET, FILM COATED ORAL TWICE DAILY
Qty: 14 | Refills: 0 | Status: ACTIVE | COMMUNITY
Start: 2022-09-28 | End: 1900-01-01

## 2023-06-30 ENCOUNTER — APPOINTMENT (OUTPATIENT)
Dept: HEMATOLOGY ONCOLOGY | Facility: CLINIC | Age: 88
End: 2023-06-30

## 2023-08-08 ENCOUNTER — APPOINTMENT (OUTPATIENT)
Dept: UROLOGY | Facility: CLINIC | Age: 88
End: 2023-08-08

## 2023-08-09 ENCOUNTER — APPOINTMENT (OUTPATIENT)
Dept: UROLOGY | Facility: CLINIC | Age: 88
End: 2023-08-09
Payer: MEDICARE

## 2023-08-09 VITALS
DIASTOLIC BLOOD PRESSURE: 79 MMHG | HEART RATE: 72 BPM | SYSTOLIC BLOOD PRESSURE: 140 MMHG | HEIGHT: 65 IN | WEIGHT: 210 LBS | BODY MASS INDEX: 34.99 KG/M2 | TEMPERATURE: 98.7 F

## 2023-08-09 PROCEDURE — 51702 INSERT TEMP BLADDER CATH: CPT

## 2023-08-15 LAB
BUN SERPL-MCNC: 21 MG/DL
CREAT SERPL-MCNC: 1.3 MG/DL
EGFR: 52 ML/MIN/1.73M2
PSA SERPL-MCNC: 1.1 NG/ML
TESTOST SERPL-MCNC: <2.5 NG/DL

## 2023-08-31 NOTE — CHART NOTE - NSCHARTNOTESELECT_GEN_ALL_CORE
Case Management Assessment  Initial Evaluation    Date/Time of Evaluation: 8/31/2023 10:44 AM  Assessment Completed by: Trevor Swain RN    If patient is discharged prior to next notation, then this note serves as note for discharge by case management. Patient Name: Geovanni Avendano                   YOB: 1927  Diagnosis: Dehydration [E86.0]  Dizziness [R42]  Near syncope [R55]  Left leg swelling [M79.89]                   Date / Time: 8/30/2023 12:37 PM    Patient Admission Status: Inpatient   Readmission Risk (Low < 19, Mod (19-27), High > 27): Readmission Risk Score: 13.8    Current PCP: Annmarie Fox MD  PCP verified by CM? Yes    Chart Reviewed: Yes      History Provided by: Patient, Child/Family  Patient Orientation: Alert and Oriented    Patient Cognition: Alert    Hospitalization in the last 30 days (Readmission):  No    If yes, Readmission Assessment in CM Navigator will be completed. Advance Directives:      Code Status: Full Code   Patient's Primary Decision Maker is: Legal Next of Kin      Discharge Planning:    Patient lives with: Other (Comment) Type of Home: Assisted living  Primary Care Giver: Other (Comment) (Asher Molina)  Patient Support Systems include: Spouse/Significant Other   Current Financial resources: Medicare  Current community resources: ECF/Home Care  Current services prior to admission: Extended Care Facility            Current DME:              Type of Home Care services:  None    ADLS  Prior functional level: Assistance with the following:, Bathing, Toileting, Dressing, Cooking, Mobility  Current functional level: Assistance with the following:, Bathing, Dressing, Toileting, Cooking, Mobility    PT AM-PAC:   /24  OT AM-PAC:   /24    Family can provide assistance at DC: No  Would you like Case Management to discuss the discharge plan with any other family members/significant others, and if so, who?  Yes  Plans to Return to Present Housing: Yes  Other Identified Event Note/Cath Issues/Barriers to RETURNING to current housing: yes  Potential Assistance needed at discharge: Extended Care Facility            Potential DME:    Patient expects to discharge to: Assisted living  Plan for transportation at discharge:      Financial    Payor: [id number] / Plan: [address] [place] [address] / Product Type: *No Product type* /     Does insurance require precert for SNF: Yes    Potential assistance Purchasing Medications:    Meds-to-Beds request:        [address] / [address] [facility] [address]ina ZAYAS [phone] - [address] [facility],  [address] [facility] [address]  [address] [facility] [address]  Phone: [phone] Fax: [address] [place] [place] [address] [address] [facility] [phone]  [address] [facility] [address]  Phone: [phone] Fax: [phone]    [facility] [facility] [phone] - F [phone]  [address] [facility]  [place] [address]  Phone: [phone] Fax: [phone]    [address] [facility], [address] [facility] - [address] [place] [address] [phone] - f [phone]  [address] [facility]  [place] [address] [facility] [address]  Phone: [phone] Fax: [phone]    [facility] and [address] [phone] Mike Johnson [phone]  [address] [place] [address]  Phone: [phone] Fax: [phone] [facility] [address] Martina Henson [phone] - F [phone]   [address] Aaron Bull [facility] [address]  Phone: [phone] Fax: [phone]      Notes:    Factors facilitating achievement of predicted outcomes: Family support, Has needed Durable Medical Equipment at home, and Knowledge about rehab    Barriers to discharge: Pain and Decreased endurance    Additional Case Management Notes: Patient currently resides at [facility], [address] will return there upon discharge.     The

## 2023-09-29 ENCOUNTER — APPOINTMENT (OUTPATIENT)
Dept: UROLOGY | Facility: CLINIC | Age: 88
End: 2023-09-29
Payer: MEDICARE

## 2023-09-29 PROCEDURE — 96402 CHEMO HORMON ANTINEOPL SQ/IM: CPT

## 2023-09-29 PROCEDURE — 51702 INSERT TEMP BLADDER CATH: CPT

## 2023-10-06 ENCOUNTER — LABORATORY RESULT (OUTPATIENT)
Age: 88
End: 2023-10-06

## 2023-10-06 ENCOUNTER — APPOINTMENT (OUTPATIENT)
Dept: HEMATOLOGY ONCOLOGY | Facility: CLINIC | Age: 88
End: 2023-10-06

## 2023-10-06 ENCOUNTER — OUTPATIENT (OUTPATIENT)
Dept: OUTPATIENT SERVICES | Facility: HOSPITAL | Age: 88
LOS: 1 days | End: 2023-10-06
Payer: MEDICARE

## 2023-10-06 DIAGNOSIS — Z95.1 PRESENCE OF AORTOCORONARY BYPASS GRAFT: Chronic | ICD-10-CM

## 2023-10-06 DIAGNOSIS — Z98.49 CATARACT EXTRACTION STATUS, UNSPECIFIED EYE: Chronic | ICD-10-CM

## 2023-10-06 PROCEDURE — 85027 COMPLETE CBC AUTOMATED: CPT

## 2023-10-06 PROCEDURE — 80053 COMPREHEN METABOLIC PANEL: CPT

## 2023-10-06 PROCEDURE — G0103: CPT

## 2023-10-06 PROCEDURE — 84403 ASSAY OF TOTAL TESTOSTERONE: CPT

## 2023-10-11 LAB
ALBUMIN SERPL ELPH-MCNC: 3.8 G/DL
ALP BLD-CCNC: 98 U/L
ALT SERPL-CCNC: 17 U/L
ANION GAP SERPL CALC-SCNC: 12 MMOL/L
AST SERPL-CCNC: 18 U/L
BILIRUB SERPL-MCNC: 0.6 MG/DL
BUN SERPL-MCNC: 19 MG/DL
CALCIUM SERPL-MCNC: 8.7 MG/DL
CHLORIDE SERPL-SCNC: 102 MMOL/L
CO2 SERPL-SCNC: 27 MMOL/L
CREAT SERPL-MCNC: 1.2 MG/DL
EGFR: 57 ML/MIN/1.73M2
GLUCOSE SERPL-MCNC: 188 MG/DL
HCT VFR BLD CALC: 40.7 %
HGB BLD-MCNC: 13 G/DL
MCHC RBC-ENTMCNC: 29.1 PG
MCHC RBC-ENTMCNC: 31.9 G/DL
MCV RBC AUTO: 91.3 FL
PLATELET # BLD AUTO: 195 K/UL
PMV BLD: 8.9 FL
POTASSIUM SERPL-SCNC: 4.8 MMOL/L
PROT SERPL-MCNC: 6 G/DL
PSA SERPL-MCNC: 1.33 NG/ML
RBC # BLD: 4.46 M/UL
RBC # FLD: 14.7 %
SODIUM SERPL-SCNC: 141 MMOL/L
TESTOST SERPL-MCNC: <2.5 NG/DL
WBC # FLD AUTO: 8.65 K/UL

## 2023-10-20 DIAGNOSIS — C61 MALIGNANT NEOPLASM OF PROSTATE: ICD-10-CM

## 2023-10-21 DIAGNOSIS — C61 MALIGNANT NEOPLASM OF PROSTATE: ICD-10-CM

## 2023-11-02 ENCOUNTER — APPOINTMENT (OUTPATIENT)
Dept: UROLOGY | Facility: CLINIC | Age: 88
End: 2023-11-02
Payer: MEDICARE

## 2023-11-02 PROCEDURE — 51702 INSERT TEMP BLADDER CATH: CPT

## 2023-11-03 ENCOUNTER — APPOINTMENT (OUTPATIENT)
Dept: HEMATOLOGY ONCOLOGY | Facility: CLINIC | Age: 88
End: 2023-11-03
Payer: MEDICARE

## 2023-11-03 ENCOUNTER — OUTPATIENT (OUTPATIENT)
Dept: OUTPATIENT SERVICES | Facility: HOSPITAL | Age: 88
LOS: 1 days | End: 2023-11-03
Payer: MEDICARE

## 2023-11-03 VITALS
BODY MASS INDEX: 35.99 KG/M2 | HEART RATE: 80 BPM | OXYGEN SATURATION: 97 % | DIASTOLIC BLOOD PRESSURE: 85 MMHG | TEMPERATURE: 97.8 F | WEIGHT: 216 LBS | HEIGHT: 65 IN | SYSTOLIC BLOOD PRESSURE: 166 MMHG | RESPIRATION RATE: 16 BRPM

## 2023-11-03 DIAGNOSIS — C61 MALIGNANT NEOPLASM OF PROSTATE: ICD-10-CM

## 2023-11-03 DIAGNOSIS — Z95.1 PRESENCE OF AORTOCORONARY BYPASS GRAFT: Chronic | ICD-10-CM

## 2023-11-03 DIAGNOSIS — K63.89 OTHER SPECIFIED DISEASES OF INTESTINE: ICD-10-CM

## 2023-11-03 DIAGNOSIS — Z98.49 CATARACT EXTRACTION STATUS, UNSPECIFIED EYE: Chronic | ICD-10-CM

## 2023-11-03 PROCEDURE — 99214 OFFICE O/P EST MOD 30 MIN: CPT

## 2023-11-03 RX ORDER — PREDNISONE 5 MG/1
5 TABLET ORAL DAILY
Qty: 90 | Refills: 3 | Status: ACTIVE | COMMUNITY
Start: 2021-11-03 | End: 1900-01-01

## 2023-12-19 ENCOUNTER — APPOINTMENT (OUTPATIENT)
Dept: UROLOGY | Facility: CLINIC | Age: 88
End: 2023-12-19
Payer: MEDICARE

## 2023-12-19 VITALS
OXYGEN SATURATION: 98 % | SYSTOLIC BLOOD PRESSURE: 152 MMHG | WEIGHT: 216 LBS | HEIGHT: 65 IN | HEART RATE: 78 BPM | BODY MASS INDEX: 35.99 KG/M2 | DIASTOLIC BLOOD PRESSURE: 78 MMHG | RESPIRATION RATE: 16 BRPM | TEMPERATURE: 97.6 F

## 2023-12-19 PROCEDURE — 51702 INSERT TEMP BLADDER CATH: CPT

## 2024-01-02 ENCOUNTER — APPOINTMENT (OUTPATIENT)
Dept: UROLOGY | Facility: CLINIC | Age: 89
End: 2024-01-02
Payer: MEDICARE

## 2024-01-02 PROCEDURE — 96402 CHEMO HORMON ANTINEOPL SQ/IM: CPT

## 2024-01-03 RX ORDER — AMOXICILLIN AND CLAVULANATE POTASSIUM 875; 125 MG/1; MG/1
875-125 TABLET, COATED ORAL
Qty: 14 | Refills: 0 | Status: ACTIVE | COMMUNITY
Start: 2022-04-27 | End: 1900-01-01

## 2024-01-16 ENCOUNTER — APPOINTMENT (OUTPATIENT)
Dept: UROLOGY | Facility: CLINIC | Age: 89
End: 2024-01-16
Payer: MEDICARE

## 2024-01-16 DIAGNOSIS — R33.9 RETENTION OF URINE, UNSPECIFIED: ICD-10-CM

## 2024-01-16 PROCEDURE — 51702 INSERT TEMP BLADDER CATH: CPT

## 2024-02-02 ENCOUNTER — APPOINTMENT (OUTPATIENT)
Dept: HEMATOLOGY ONCOLOGY | Facility: CLINIC | Age: 89
End: 2024-02-02

## 2024-02-27 ENCOUNTER — APPOINTMENT (OUTPATIENT)
Dept: UROLOGY | Facility: CLINIC | Age: 89
End: 2024-02-27
Payer: MEDICARE

## 2024-02-27 PROCEDURE — 51702 INSERT TEMP BLADDER CATH: CPT

## 2024-04-09 ENCOUNTER — APPOINTMENT (OUTPATIENT)
Dept: UROLOGY | Facility: CLINIC | Age: 89
End: 2024-04-09
Payer: MEDICARE

## 2024-04-09 PROCEDURE — 51702 INSERT TEMP BLADDER CATH: CPT

## 2024-04-09 PROCEDURE — 96402 CHEMO HORMON ANTINEOPL SQ/IM: CPT

## 2024-04-10 ENCOUNTER — NON-APPOINTMENT (OUTPATIENT)
Age: 89
End: 2024-04-10

## 2024-04-29 ENCOUNTER — APPOINTMENT (OUTPATIENT)
Dept: ORTHOPEDIC SURGERY | Facility: CLINIC | Age: 89
End: 2024-04-29
Payer: MEDICARE

## 2024-04-29 DIAGNOSIS — R10.31 RIGHT LOWER QUADRANT PAIN: ICD-10-CM

## 2024-04-29 PROCEDURE — 73503 X-RAY EXAM HIP UNI 4/> VIEWS: CPT | Mod: RT

## 2024-04-29 PROCEDURE — 99203 OFFICE O/P NEW LOW 30 MIN: CPT

## 2024-04-29 NOTE — ASSESSMENT
[FreeTextEntry1] : Assessment: Right groin pain status post golfing injury 6 days ago.  Pain with logroll, bearing weight, and hip range of motion.  History of prostate cancer.  Plan: 1.  Clinical and radiographic findings reviewed with the patient and his daughter.  I reviewed today's x-rays with him. 2.  Given that he is having fairly significant pain with logroll as well as weightbearing, we will obtain a STAT MRI of the right hip to rule out an occult hip fracture.  Would also like to rule out pathologic lesions given his history of prostate cancer.  There are some areas of patchy sclerosis of the femoral head on x-ray. 3.  I recommended that he be protected/partial weightbearing on the right lower extremity with a walker pending the MRI. 4.  I will follow-up with the patient and his daughter once the MRI is complete to review the results and discuss next steps in treatment.  Plan of care was discussed with them and they are in agreement.  All questions were answered.

## 2024-04-29 NOTE — HISTORY OF PRESENT ILLNESS
[de-identified] : The patient is a 89-year-old male who is here today for evaluation of right groin pain.  He is accompanied by his daughter.  He first began having pain after what sounds like a twisting injury when he was golfing 6 days ago on 4/23/2024.  No recent falls.  He has been able to ambulate since the pain started.  However he reports pain with weightbearing.  He also reports pain with hip range of motion.  The pain is over the right groin.  No radiating leg pain.  He rates the pain as 8 out of 10.  He ambulates with a walker.  He has been taking Motrin and Tylenol as needed which helps with the pain.  Of note he does have a history of prostate cancer status post prior radiation.  More recently he was noted to have an increasing PSA and his daughter reports that an additional pill was added on top of the Lupron injections that he gets every 3 months.  He denies any prior right hip or groin pain prior to the last week.  Past medical/surgical history: Quadruple bypass 7 years ago, right thigh surgeries x 3, bilateral carpal tunnel surgery with Dr. Cerda in 2020.  Allergies: None  Current medications: Baby aspirin, Lipitor, Eliquis, Lupron every 3 months injection, metoprolol, Synthroid, abiraterone acetate  Family history: Noncontributory  Social history:  Denies alcohol use Denies recreational drug use  Review of systems: A 10-point review of systems was unremarkable as noted on the new patient questionnaire  The patient is well-appearing.  His height is 5 foot 6 and his weight is 209 pounds.  He ambulates with a slow gait with a walker.  Examination of the right hip demonstrates intact skin.  He does have pain in his right groin with logroll.  He also has pain with passive hip flexion, internal and external rotation.  He is able to briefly straight leg raise but is unable to hold this due to pain.  He is neurovascularly intact distally.  Pelvis and right hip x-rays taken in the office today were personally reviewed, demonstrating bilateral degenerative changes with mild joint space narrowing.  There is what appears to be patchy sclerosis in the femoral head on the right.

## 2024-04-30 ENCOUNTER — OUTPATIENT (OUTPATIENT)
Dept: OUTPATIENT SERVICES | Facility: HOSPITAL | Age: 89
LOS: 1 days | Discharge: HOME | End: 2024-04-30
Payer: MEDICARE

## 2024-04-30 VITALS
SYSTOLIC BLOOD PRESSURE: 171 MMHG | RESPIRATION RATE: 18 BRPM | OXYGEN SATURATION: 97 % | HEART RATE: 72 BPM | DIASTOLIC BLOOD PRESSURE: 77 MMHG | TEMPERATURE: 97 F | WEIGHT: 209 LBS | HEIGHT: 68 IN

## 2024-04-30 VITALS — DIASTOLIC BLOOD PRESSURE: 77 MMHG | SYSTOLIC BLOOD PRESSURE: 168 MMHG | RESPIRATION RATE: 18 BRPM | HEART RATE: 79 BPM

## 2024-04-30 DIAGNOSIS — Z98.49 CATARACT EXTRACTION STATUS, UNSPECIFIED EYE: Chronic | ICD-10-CM

## 2024-04-30 DIAGNOSIS — H25.12 AGE-RELATED NUCLEAR CATARACT, LEFT EYE: ICD-10-CM

## 2024-04-30 DIAGNOSIS — Z95.1 PRESENCE OF AORTOCORONARY BYPASS GRAFT: Chronic | ICD-10-CM

## 2024-04-30 PROCEDURE — V2632: CPT

## 2024-04-30 RX ORDER — ATORVASTATIN CALCIUM 80 MG/1
1 TABLET, FILM COATED ORAL
Qty: 0 | Refills: 0 | DISCHARGE

## 2024-04-30 RX ORDER — ASPIRIN/CALCIUM CARB/MAGNESIUM 324 MG
1 TABLET ORAL
Qty: 0 | Refills: 0 | DISCHARGE

## 2024-04-30 RX ORDER — BICALUTAMIDE 50 MG/1
1 TABLET, FILM COATED ORAL
Qty: 0 | Refills: 0 | DISCHARGE

## 2024-04-30 RX ORDER — METOPROLOL TARTRATE 50 MG
1 TABLET ORAL
Qty: 0 | Refills: 0 | DISCHARGE

## 2024-04-30 NOTE — ASU PREOP CHECKLIST - LAST DOSE WITHIN LAST 24HRS
11/18/21: Recommend TOPICAL STEROID AND NON-STEROID ANTI-INFLAMMATORY therapy. DISCUSSED THE LIKELIHOOD OF DOUGLAS LAURA COMPONENT. CONT TO MONITOR FOR CHANGES. Yes

## 2024-04-30 NOTE — ASU PREOP CHECKLIST - PATIENT SENT TO
holding area report given to antonio barnett/operating room/holding area report given to mauro desir rn/operating room/holding area

## 2024-04-30 NOTE — ASU PATIENT PROFILE, ADULT - FALL HARM RISK - HARM RISK INTERVENTIONS

## 2024-05-08 ENCOUNTER — APPOINTMENT (OUTPATIENT)
Dept: UROLOGY | Facility: CLINIC | Age: 89
End: 2024-05-08
Payer: MEDICARE

## 2024-05-08 PROCEDURE — 51702 INSERT TEMP BLADDER CATH: CPT

## 2024-05-08 RX ORDER — AMOXICILLIN AND CLAVULANATE POTASSIUM 250; 62.5 MG/5ML; MG/5ML
250-62.5 FOR SUSPENSION ORAL
Qty: 2 | Refills: 0 | Status: ACTIVE | COMMUNITY
Start: 2024-01-04 | End: 1900-01-01

## 2024-05-09 DIAGNOSIS — Z95.1 PRESENCE OF AORTOCORONARY BYPASS GRAFT: ICD-10-CM

## 2024-05-09 DIAGNOSIS — H25.12 AGE-RELATED NUCLEAR CATARACT, LEFT EYE: ICD-10-CM

## 2024-05-09 DIAGNOSIS — Z79.01 LONG TERM (CURRENT) USE OF ANTICOAGULANTS: ICD-10-CM

## 2024-05-09 DIAGNOSIS — Z96.1 PRESENCE OF INTRAOCULAR LENS: ICD-10-CM

## 2024-05-09 DIAGNOSIS — E03.9 HYPOTHYROIDISM, UNSPECIFIED: ICD-10-CM

## 2024-05-09 DIAGNOSIS — Z85.46 PERSONAL HISTORY OF MALIGNANT NEOPLASM OF PROSTATE: ICD-10-CM

## 2024-05-09 DIAGNOSIS — Z98.41 CATARACT EXTRACTION STATUS, RIGHT EYE: ICD-10-CM

## 2024-05-09 DIAGNOSIS — I48.91 UNSPECIFIED ATRIAL FIBRILLATION: ICD-10-CM

## 2024-05-09 DIAGNOSIS — Z79.82 LONG TERM (CURRENT) USE OF ASPIRIN: ICD-10-CM

## 2024-05-09 DIAGNOSIS — I10 ESSENTIAL (PRIMARY) HYPERTENSION: ICD-10-CM

## 2024-06-07 LAB — PSA SERPL-MCNC: 2.37 NG/ML

## 2024-06-11 ENCOUNTER — APPOINTMENT (OUTPATIENT)
Dept: UROLOGY | Facility: CLINIC | Age: 89
End: 2024-06-11
Payer: MEDICARE

## 2024-06-11 PROCEDURE — 51702 INSERT TEMP BLADDER CATH: CPT

## 2024-07-10 ENCOUNTER — APPOINTMENT (OUTPATIENT)
Dept: UROLOGY | Facility: CLINIC | Age: 89
End: 2024-07-10

## 2024-07-16 ENCOUNTER — APPOINTMENT (OUTPATIENT)
Age: 89
End: 2024-07-16

## 2024-07-23 ENCOUNTER — APPOINTMENT (OUTPATIENT)
Dept: UROLOGY | Facility: CLINIC | Age: 89
End: 2024-07-23
Payer: MEDICARE

## 2024-07-23 PROCEDURE — 96402 CHEMO HORMON ANTINEOPL SQ/IM: CPT

## 2024-07-23 PROCEDURE — 51702 INSERT TEMP BLADDER CATH: CPT

## 2024-08-01 ENCOUNTER — LABORATORY RESULT (OUTPATIENT)
Age: 89
End: 2024-08-01

## 2024-08-01 ENCOUNTER — OUTPATIENT (OUTPATIENT)
Dept: OUTPATIENT SERVICES | Facility: HOSPITAL | Age: 89
LOS: 1 days | End: 2024-08-01
Payer: MEDICARE

## 2024-08-01 ENCOUNTER — APPOINTMENT (OUTPATIENT)
Age: 89
End: 2024-08-01
Payer: MEDICARE

## 2024-08-01 VITALS
HEART RATE: 73 BPM | WEIGHT: 203 LBS | HEIGHT: 65 IN | RESPIRATION RATE: 14 BRPM | TEMPERATURE: 98.2 F | BODY MASS INDEX: 33.82 KG/M2 | SYSTOLIC BLOOD PRESSURE: 118 MMHG | DIASTOLIC BLOOD PRESSURE: 63 MMHG

## 2024-08-01 DIAGNOSIS — Z98.49 CATARACT EXTRACTION STATUS, UNSPECIFIED EYE: Chronic | ICD-10-CM

## 2024-08-01 DIAGNOSIS — C61 MALIGNANT NEOPLASM OF PROSTATE: ICD-10-CM

## 2024-08-01 DIAGNOSIS — M25.551 PAIN IN RIGHT HIP: ICD-10-CM

## 2024-08-01 DIAGNOSIS — Z95.1 PRESENCE OF AORTOCORONARY BYPASS GRAFT: Chronic | ICD-10-CM

## 2024-08-01 PROCEDURE — 85027 COMPLETE CBC AUTOMATED: CPT

## 2024-08-01 PROCEDURE — 99214 OFFICE O/P EST MOD 30 MIN: CPT

## 2024-08-01 PROCEDURE — 80053 COMPREHEN METABOLIC PANEL: CPT

## 2024-08-01 PROCEDURE — 84153 ASSAY OF PSA TOTAL: CPT

## 2024-08-01 PROCEDURE — G2211 COMPLEX E/M VISIT ADD ON: CPT

## 2024-08-01 PROCEDURE — 84403 ASSAY OF TOTAL TESTOSTERONE: CPT

## 2024-08-02 DIAGNOSIS — C61 MALIGNANT NEOPLASM OF PROSTATE: ICD-10-CM

## 2024-08-02 LAB
ALBUMIN SERPL ELPH-MCNC: 3.5 G/DL
ALP BLD-CCNC: 100 U/L
ALT SERPL-CCNC: 11 U/L
ANION GAP SERPL CALC-SCNC: 12 MMOL/L
AST SERPL-CCNC: 14 U/L
BILIRUB SERPL-MCNC: 0.5 MG/DL
BUN SERPL-MCNC: 26 MG/DL
CALCIUM SERPL-MCNC: 8.6 MG/DL
CHLORIDE SERPL-SCNC: 104 MMOL/L
CO2 SERPL-SCNC: 25 MMOL/L
CREAT SERPL-MCNC: 1.4 MG/DL
EGFR: 47 ML/MIN/1.73M2
GLUCOSE SERPL-MCNC: 223 MG/DL
HCT VFR BLD CALC: 38 %
HGB BLD-MCNC: 12.5 G/DL
MCHC RBC-ENTMCNC: 30 PG
MCHC RBC-ENTMCNC: 32.9 G/DL
MCV RBC AUTO: 91.3 FL
PLATELET # BLD AUTO: 178 K/UL
PMV BLD: 8.9 FL
POTASSIUM SERPL-SCNC: 5.1 MMOL/L
PROT SERPL-MCNC: 5.8 G/DL
PSA SERPL-MCNC: 2.65 NG/ML
RBC # BLD: 4.16 M/UL
RBC # FLD: 15 %
SODIUM SERPL-SCNC: 141 MMOL/L
TESTOST SERPL-MCNC: <2.5 NG/DL
WBC # FLD AUTO: 9.16 K/UL

## 2024-08-02 NOTE — HISTORY OF PRESENT ILLNESS
[de-identified] : CC: I have prostate cancer \par  \par  He is here at the request of Dr. Carlisle and Loy\par  \par  History obtained form patient. Daughter and Dr. Carlisle and Loy notes\par  \par  Briefly this is an 89 year old male with history of CAD s/p CABG, afib on apixaban and rest as bellow as well as prostate cancer. His prostate cancer history \par  Prostate CA dx'ed 1996- T1C, [place] 6, left lobe, PSA 9.0 at diagnosis. pt treated with EBRT  biochemical recurrence in 2006 and started on Lupron depot 4 month- previously followed by Dr. Sanchez- per pts daughter "scans" were done and it did not show metastatic dz\par  PSA has been on Lupron alone- casodex 50mg added on the 1/2020 visit for rising PSA but stopped on 12/2020 for continued rising PSA.\par  \par  \par   PSA 5.3 9/2021 testosterone <10 PSADT- 5.3 months\par   3.1 6/2021 \par   2.6 3/2021 testosterone- 7.0 \par   1.7 11/2020 - Casodex stopped \par   1.1 9/2020 - on CAB test- Pending\par   0.9 5/2020- on CAB test- 5\par   1.3 1/2020 test= 4  dexa scan-9/2019- normal\par   1.0 8/2019 test = 10\par   0.9 5/2019 testosterone= 5 \par   0.7 12/2018 \par   0.5 test 4- 9/5/18\par   0.6 12/2017 [de-identified] : 11/3/21 Patient is here for a follow-up visit for prostate cancer, accompanied by family member.  He is feeling well with no new complaints.  Reviewed most recent labwork, which shows PSA up to 7.13ng/mL.  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  Bone Scan shows no evidence of metastatic bone disease.  He takes Eliquis 2.5mg BiD.  Patient denies fever, chills, nausea, vomiting, dyspnea, diarrhea, new bony pain or bleeding.    NM Bone Scan (10.27.2021) Impression:No definite evidence for metastatic bone disease.  Probably degenerative changes in the cervical and lumbar spine and peripheral joints CT C/A/P (11.1.2021) IMPRESSION:1.  Spiculated 4.1 x 3.3 x 5.7 cm mesenteric mass with coarse calcifications, new from CT of 8/31/2006. 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 Further workup is needed. 2.  Bladder contracted around Silva catheter. Bladder wall thickening. Correlate with urinalysis.  Subcentimeter pulmonary nodules as above, nonspecific in nature measuring up to 5 mm. These were not definitively seen on prior exam, but is difficult to compare due to differences in technique  3/21/22 Pt returns for f/u visit today with his daughter. He continues to take abiraterone with prednisone and tolerating it well. Last PSA was 1.88 on 2/28/22. Pt denies any bone pains , but twisted his left leg when he was attempting to stand from sitting position, reports he has pain in his left leg , its improving now , he is using a walker to ambulate.Reports pain improves with motrin.On last visit he was recommended to get a biopsy of mesentaric mass seen on CT abdomen , pt went to [place] and it was never done. Today he reports feeling fine , denies any abdominal pain or discomfort. Pt is receiving Lupron every 3 months with Dr Carlisle.   11/28/22 They are here for follow up. Since last viist he had a CT abd  4/1/2022 that showed essentially a stable  PSA in 9/2022 was 0.81 coming down. He feels well. He has no complains   11/3/2023 HE is here for follow up. Has not seen me since last year.  He had blood workin 10/6/2023: CBC is fine, CMP is fine PSA was 1.33 which is stable Testosteoen was low. His BP is high a bit. htey have a machine and will marisel lme if remains high at home. He feels well.   8/1/2024 He is here for follow up.  PSA on 6/7/2024  2.37 he is doing well overall but now uses a motorized chair because of hip pain which seems to be from arthritis he is on a therapeutic surgeon and he has an MRI of the hip pending.  Otherwise he is doing well he has no new complaints he is taking his medications without any side effects

## 2024-08-02 NOTE — ASSESSMENT
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 Prostate CA dx'ed 1996- T1C, [place] 6, left lobe, PSA 9.0 at diagnosis. pt treated with EBRT biochemical recurrence in 2006 and started on Lupron depot 4 month. casodex 50mg added on the 1/2020 visit for rising PSA but stopped on 12/2020 for continued rising PSA. PSADT is 5-6 month  - on ADT and Abiraterone and prednisone and mist recent PSA is stable around 1.  -PSA went from 1.33 in Oct 2023 to 2.37 from June   #Mesenteric mass , incidental finding  -stable on CT from 4/2022    Plan:  --continue with Abiraterone 1000mg daily on an empty stomach + prednisone 5mg daily; refill sent. Side effects discussed. Written information given.  PSA has been slowly coming up but not any medication to stop treatment he is really not interested in any reimaging at this time which is understandable we will hold all and consider in the future if significant rise in PSA and/or has any symptoms  - he knows to HOLD Eliquis 3 days prior to any procedure and resume 24 hours following procedure as long as no bleeding complications  - since M0 disease, he is on Abiraterone with prednisone since he stated would not wish to be on Coumadin for his afib and we cant use DOACs ( granted possibly Pradaxa) can be used with enzalutimide based based class medication ( Appalutimide and darolutimdide as well) granted most data is for enzalutimide based treatment for M0 but data dose exist to support Abiraterone in this setting.  -monitor: CBC, CMP, PSA, and testosterone  -She declined any imaging for the mesenteric mass since it is not bothering him  -if BP remain high at home they will call will add on an antihypertensive He has an MRI of the hip pending  RTC  3 months

## 2024-08-02 NOTE — HISTORY OF PRESENT ILLNESS
[de-identified] : CC: I have prostate cancer \par  \par  He is here at the request of Dr. Carlisle and Loy\par  \par  History obtained form patient. Daughter and Dr. Carlisle and Loy notes\par  \par  Briefly this is an 89 year old male with history of CAD s/p CABG, afib on apixaban and rest as bellow as well as prostate cancer. His prostate cancer history \par  Prostate CA dx'ed 1996- T1C, [place] 6, left lobe, PSA 9.0 at diagnosis. pt treated with EBRT  biochemical recurrence in 2006 and started on Lupron depot 4 month- previously followed by Dr. Sanchez- per pts daughter "scans" were done and it did not show metastatic dz\par  PSA has been on Lupron alone- casodex 50mg added on the 1/2020 visit for rising PSA but stopped on 12/2020 for continued rising PSA.\par  \par  \par   PSA 5.3 9/2021 testosterone <10 PSADT- 5.3 months\par   3.1 6/2021 \par   2.6 3/2021 testosterone- 7.0 \par   1.7 11/2020 - Casodex stopped \par   1.1 9/2020 - on CAB test- Pending\par   0.9 5/2020- on CAB test- 5\par   1.3 1/2020 test= 4  dexa scan-9/2019- normal\par   1.0 8/2019 test = 10\par   0.9 5/2019 testosterone= 5 \par   0.7 12/2018 \par   0.5 test 4- 9/5/18\par   0.6 12/2017 [de-identified] : 11/3/21 Patient is here for a follow-up visit for prostate cancer, accompanied by family member.  He is feeling well with no new complaints.  Reviewed most recent labwork, which shows PSA up to 7.13ng/mL.  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  Bone Scan shows no evidence of metastatic bone disease.  He takes Eliquis 2.5mg BiD.  Patient denies fever, chills, nausea, vomiting, dyspnea, diarrhea, new bony pain or bleeding.    NM Bone Scan (10.27.2021) Impression:No definite evidence for metastatic bone disease.  Probably degenerative changes in the cervical and lumbar spine and peripheral joints CT C/A/P (11.1.2021) IMPRESSION:1.  Spiculated 4.1 x 3.3 x 5.7 cm mesenteric mass with coarse calcifications, new from CT of 8/31/2006. 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 Further workup is needed. 2.  Bladder contracted around Silva catheter. Bladder wall thickening. Correlate with urinalysis.  Subcentimeter pulmonary nodules as above, nonspecific in nature measuring up to 5 mm. These were not definitively seen on prior exam, but is difficult to compare due to differences in technique  3/21/22 Pt returns for f/u visit today with his daughter. He continues to take abiraterone with prednisone and tolerating it well. Last PSA was 1.88 on 2/28/22. Pt denies any bone pains , but twisted his left leg when he was attempting to stand from sitting position, reports he has pain in his left leg , its improving now , he is using a walker to ambulate.Reports pain improves with motrin.On last visit he was recommended to get a biopsy of mesentaric mass seen on CT abdomen , pt went to [place] and it was never done. Today he reports feeling fine , denies any abdominal pain or discomfort. Pt is receiving Lupron every 3 months with Dr Carlisle.   11/28/22 They are here for follow up. Since last viist he had a CT abd  4/1/2022 that showed essentially a stable  PSA in 9/2022 was 0.81 coming down. He feels well. He has no complains   11/3/2023 HE is here for follow up. Has not seen me since last year.  He had blood workin 10/6/2023: CBC is fine, CMP is fine PSA was 1.33 which is stable Testosteoen was low. His BP is high a bit. htey have a machine and will marisel lme if remains high at home. He feels well.   8/1/2024 He is here for follow up.  PSA on 6/7/2024  2.37 he is doing well overall but now uses a motorized chair because of hip pain which seems to be from arthritis he is on a therapeutic surgeon and he has an MRI of the hip pending.  Otherwise he is doing well he has no new complaints he is taking his medications without any side effects

## 2024-08-02 NOTE — PHYSICAL EXAM
[Restricted in physically strenuous activity but ambulatory and able to carry out work of a light or sedentary nature] : Status 1- Restricted in physically strenuous activity but ambulatory and able to carry out work of a light or sedentary nature, e.g., light house work, office work [Normal] : affect appropriate [de-identified] : Bilateral edema in legs, chornic

## 2024-08-02 NOTE — REVIEW OF SYSTEMS
[Lower Ext Edema] : lower extremity edema [Negative] : Allergic/Immunologic [Fever] : no fever [Chills] : no chills [Joint Pain] : no joint pain [Easy Bleeding] : no tendency for easy bleeding

## 2024-08-02 NOTE — PHYSICAL EXAM
[Restricted in physically strenuous activity but ambulatory and able to carry out work of a light or sedentary nature] : Status 1- Restricted in physically strenuous activity but ambulatory and able to carry out work of a light or sedentary nature, e.g., light house work, office work [Normal] : affect appropriate [de-identified] : Bilateral edema in legs, chornic Helical Rim Advancement Flap Text: The defect edges were debeveled with a #15 blade scalpel.  Given the location of the defect and the proximity to free margins (helical rim) a double helical rim advancement flap was deemed most appropriate.  Using a sterile surgical marker, the appropriate advancement flaps were drawn incorporating the defect and placing the expected incisions between the helical rim and antihelix where possible.  The area thus outlined was incised through and through with a #15 scalpel blade.  With a skin hook and iris scissors, the flaps were gently and sharply undermined and freed up.

## 2024-08-02 NOTE — ASSESSMENT
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 Prostate CA dx'ed 1996- T1C, [place] 6, left lobe, PSA 9.0 at diagnosis. pt treated with EBRT biochemical recurrence in 2006 and started on Lupron depot 4 month. casodex 50mg added on the 1/2020 visit for rising PSA but stopped on 12/2020 for continued rising PSA. PSADT is 5-6 month  - on ADT and Abiraterone and prednisone and mist recent PSA is stable around 1.  -PSA went from 1.33 in Oct 2023 to 2.37 from June   #Mesenteric mass , incidental finding  -stable on CT from 4/2022    Plan:  --continue with Abiraterone 1000mg daily on an empty stomach + prednisone 5mg daily; refill sent. Side effects discussed. Written information given.  PSA has been slowly coming up but not any medication to stop treatment he is really not interested in any reimaging at this time which is understandable we will hold all and consider in the future if significant rise in PSA and/or has any symptoms  - he knows to HOLD Eliquis 3 days prior to any procedure and resume 24 hours following procedure as long as no bleeding complications  - since M0 disease, he is on Abiraterone with prednisone since he stated would not wish to be on Coumadin for his afib and we cant use DOACs ( granted possibly Pradaxa) can be used with enzalutimide based based class medication ( Appalutimide and darolutimdide as well) granted most data is for enzalutimide based treatment for M0 but data dose exist to support Abiraterone in this setting.  -monitor: CBC, CMP, PSA, and testosterone  -She declined any imaging for the mesenteric mass since it is not bothering him  -if BP remain high at home they will call will add on an antihypertensive He has an MRI of the hip pending  RTC  3 months

## 2024-09-03 ENCOUNTER — APPOINTMENT (OUTPATIENT)
Dept: UROLOGY | Facility: CLINIC | Age: 89
End: 2024-09-03
Payer: MEDICARE

## 2024-09-03 PROCEDURE — 51702 INSERT TEMP BLADDER CATH: CPT

## 2024-09-09 ENCOUNTER — APPOINTMENT (OUTPATIENT)
Dept: ORTHOPEDIC SURGERY | Facility: CLINIC | Age: 89
End: 2024-09-09
Payer: MEDICARE

## 2024-09-09 DIAGNOSIS — M16.11 UNILATERAL PRIMARY OSTEOARTHRITIS, RIGHT HIP: ICD-10-CM

## 2024-09-09 PROCEDURE — 99212 OFFICE O/P EST SF 10 MIN: CPT

## 2024-09-09 NOTE — HISTORY OF PRESENT ILLNESS
[de-identified] : The patient is a 89-year-old male who is here today for follow-up of right groin pain.  He is accompanied by his daughter.  I last saw him on 4/29/2024.  He had been having pain in his right groin after what sounds like a twisting injury when he was golfing 6 days prior on 4/23/2024.  He does have a history of prostate cancer.  He had a CT of his right hip done to rule out an occult hip fracture and to rule out pathologic lesions.  Unable to get an MRI.  He is here today to review the results.  He reports persistent pain in his right groin.  He has been taking Tylenol and Motrin which helps somewhat.  The patient is well-appearing.  He ambulates with a slow gait with a rolling walker demonstrates intact skin.  He has pain with passive hip flexion, internal and external rotation.  He is able to briefly straight leg raise but is unable to hold this due to pain.  Neurovascularly intact distally.  Right hip CT completed at [facility] radiology on 8/12/2024, report: Severe right hip joint arthrosis Disc bulging and spinal canal stenosis in the lower lumbar spine Moderate right sacroiliac joint arthrosis.  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

## 2024-09-09 NOTE — ASSESSMENT
Pt notified script was authorized and is ready.  Pt will like script filled.  Script taken to the pharmacy   [FreeTextEntry1] : Assessment: Right hip arthritis  Plan: 1.  Clinical and CT findings were reviewed with the patient and his daughter 2.  He continues to have persistent pain in his right groin despite Tylenol and Motrin.  I discussed treatment options with him including continue with medications, physical therapy, and the option of a cortisone injection.  He will continue with Tylenol and Motrin as needed.  He is interested in the option of a cortisone injection.  We provided him with a referral to pain management for evaluation of a possible ultrasound-guided right hip cortisone injection. 3.  I would be happy to see him back in the office on an as-needed basis.  Plan of care discussed with the patient and his daughter.  They are in agreement and all questions were answered.

## 2024-09-20 ENCOUNTER — NON-APPOINTMENT (OUTPATIENT)
Age: 89
End: 2024-09-20

## 2024-09-23 ENCOUNTER — APPOINTMENT (OUTPATIENT)
Dept: PAIN MANAGEMENT | Facility: CLINIC | Age: 89
End: 2024-09-23

## 2024-09-27 ENCOUNTER — NON-APPOINTMENT (OUTPATIENT)
Age: 89
End: 2024-09-27

## 2024-10-08 ENCOUNTER — APPOINTMENT (OUTPATIENT)
Dept: UROLOGY | Facility: CLINIC | Age: 89
End: 2024-10-08

## 2024-10-10 ENCOUNTER — APPOINTMENT (OUTPATIENT)
Dept: PAIN MANAGEMENT | Facility: CLINIC | Age: 89
End: 2024-10-10
Payer: MEDICARE

## 2024-10-10 DIAGNOSIS — M25.551 PAIN IN RIGHT HIP: ICD-10-CM

## 2024-10-10 DIAGNOSIS — M16.11 UNILATERAL PRIMARY OSTEOARTHRITIS, RIGHT HIP: ICD-10-CM

## 2024-10-10 PROCEDURE — 99204 OFFICE O/P NEW MOD 45 MIN: CPT

## 2024-10-15 ENCOUNTER — APPOINTMENT (OUTPATIENT)
Dept: UROLOGY | Facility: CLINIC | Age: 89
End: 2024-10-15

## 2024-10-16 ENCOUNTER — APPOINTMENT (OUTPATIENT)
Dept: UROLOGY | Facility: CLINIC | Age: 89
End: 2024-10-16
Payer: MEDICARE

## 2024-10-16 PROCEDURE — 51702 INSERT TEMP BLADDER CATH: CPT

## 2024-10-22 ENCOUNTER — NON-APPOINTMENT (OUTPATIENT)
Age: 89
End: 2024-10-22

## 2024-10-23 ENCOUNTER — APPOINTMENT (OUTPATIENT)
Dept: PAIN MANAGEMENT | Facility: CLINIC | Age: 89
End: 2024-10-23
Payer: MEDICARE

## 2024-10-30 ENCOUNTER — APPOINTMENT (OUTPATIENT)
Dept: PAIN MANAGEMENT | Facility: CLINIC | Age: 89
End: 2024-10-30
Payer: MEDICARE

## 2024-10-30 DIAGNOSIS — M16.11 UNILATERAL PRIMARY OSTEOARTHRITIS, RIGHT HIP: ICD-10-CM

## 2024-10-30 PROCEDURE — 77002 NEEDLE LOCALIZATION BY XRAY: CPT | Mod: 79

## 2024-10-30 PROCEDURE — 20610 DRAIN/INJ JOINT/BURSA W/O US: CPT | Mod: RT

## 2024-11-06 ENCOUNTER — APPOINTMENT (OUTPATIENT)
Dept: UROLOGY | Facility: CLINIC | Age: 89
End: 2024-11-06
Payer: MEDICARE

## 2024-11-06 PROCEDURE — 51702 INSERT TEMP BLADDER CATH: CPT

## 2024-11-10 ENCOUNTER — INPATIENT (INPATIENT)
Facility: HOSPITAL | Age: 89
LOS: 0 days | Discharge: HOME | End: 2024-11-10
Attending: HOSPITALIST | Admitting: HOSPITALIST
Payer: MEDICARE

## 2024-11-10 VITALS
OXYGEN SATURATION: 96 % | DIASTOLIC BLOOD PRESSURE: 76 MMHG | RESPIRATION RATE: 20 BRPM | WEIGHT: 190.04 LBS | TEMPERATURE: 99 F | HEIGHT: 66 IN | SYSTOLIC BLOOD PRESSURE: 125 MMHG | HEART RATE: 105 BPM

## 2024-11-10 VITALS — HEART RATE: 92 BPM | SYSTOLIC BLOOD PRESSURE: 134 MMHG | DIASTOLIC BLOOD PRESSURE: 71 MMHG | TEMPERATURE: 98 F

## 2024-11-10 DIAGNOSIS — Z95.1 PRESENCE OF AORTOCORONARY BYPASS GRAFT: Chronic | ICD-10-CM

## 2024-11-10 DIAGNOSIS — Z98.49 CATARACT EXTRACTION STATUS, UNSPECIFIED EYE: Chronic | ICD-10-CM

## 2024-11-10 DIAGNOSIS — N39.0 URINARY TRACT INFECTION, SITE NOT SPECIFIED: ICD-10-CM

## 2024-11-10 LAB
ALBUMIN SERPL ELPH-MCNC: 3.3 G/DL — LOW (ref 3.5–5.2)
ALP SERPL-CCNC: 93 U/L — SIGNIFICANT CHANGE UP (ref 30–115)
ALT FLD-CCNC: 15 U/L — SIGNIFICANT CHANGE UP (ref 0–41)
ANION GAP SERPL CALC-SCNC: 11 MMOL/L — SIGNIFICANT CHANGE UP (ref 7–14)
APPEARANCE UR: ABNORMAL
AST SERPL-CCNC: 20 U/L — SIGNIFICANT CHANGE UP (ref 0–41)
BACTERIA # UR AUTO: ABNORMAL /HPF
BASOPHILS # BLD AUTO: 0.03 K/UL — SIGNIFICANT CHANGE UP (ref 0–0.2)
BASOPHILS NFR BLD AUTO: 0.4 % — SIGNIFICANT CHANGE UP (ref 0–1)
BILIRUB SERPL-MCNC: 1.3 MG/DL — HIGH (ref 0.2–1.2)
BILIRUB UR-MCNC: ABNORMAL
BUN SERPL-MCNC: 17 MG/DL — SIGNIFICANT CHANGE UP (ref 10–20)
CALCIUM SERPL-MCNC: 8.5 MG/DL — SIGNIFICANT CHANGE UP (ref 8.4–10.5)
CAST: 11 /LPF — HIGH (ref 0–4)
CHLORIDE SERPL-SCNC: 99 MMOL/L — SIGNIFICANT CHANGE UP (ref 98–110)
CO2 SERPL-SCNC: 26 MMOL/L — SIGNIFICANT CHANGE UP (ref 17–32)
COLOR SPEC: ABNORMAL
CREAT SERPL-MCNC: 1.5 MG/DL — SIGNIFICANT CHANGE UP (ref 0.7–1.5)
DIFF PNL FLD: ABNORMAL
EGFR: 43 ML/MIN/1.73M2 — LOW
EOSINOPHIL # BLD AUTO: 0.1 K/UL — SIGNIFICANT CHANGE UP (ref 0–0.7)
EOSINOPHIL NFR BLD AUTO: 1.3 % — SIGNIFICANT CHANGE UP (ref 0–8)
GLUCOSE SERPL-MCNC: 141 MG/DL — HIGH (ref 70–99)
GLUCOSE UR QL: NEGATIVE MG/DL — SIGNIFICANT CHANGE UP
HCT VFR BLD CALC: 37.8 % — LOW (ref 42–52)
HGB BLD-MCNC: 12.2 G/DL — LOW (ref 14–18)
IMM GRANULOCYTES NFR BLD AUTO: 0.4 % — HIGH (ref 0.1–0.3)
KETONES UR-MCNC: ABNORMAL MG/DL
LACTATE SERPL-SCNC: 1.8 MMOL/L — SIGNIFICANT CHANGE UP (ref 0.7–2)
LEUKOCYTE ESTERASE UR-ACNC: ABNORMAL
LYMPHOCYTES # BLD AUTO: 0.68 K/UL — LOW (ref 1.2–3.4)
LYMPHOCYTES # BLD AUTO: 8.7 % — LOW (ref 20.5–51.1)
MCHC RBC-ENTMCNC: 29.1 PG — SIGNIFICANT CHANGE UP (ref 27–31)
MCHC RBC-ENTMCNC: 32.3 G/DL — SIGNIFICANT CHANGE UP (ref 32–37)
MCV RBC AUTO: 90.2 FL — SIGNIFICANT CHANGE UP (ref 80–94)
MONOCYTES # BLD AUTO: 0.61 K/UL — HIGH (ref 0.1–0.6)
MONOCYTES NFR BLD AUTO: 7.8 % — SIGNIFICANT CHANGE UP (ref 1.7–9.3)
NEUTROPHILS # BLD AUTO: 6.37 K/UL — SIGNIFICANT CHANGE UP (ref 1.4–6.5)
NEUTROPHILS NFR BLD AUTO: 81.4 % — HIGH (ref 42.2–75.2)
NITRITE UR-MCNC: POSITIVE
NRBC # BLD: 0 /100 WBCS — SIGNIFICANT CHANGE UP (ref 0–0)
PH UR: SIGNIFICANT CHANGE UP (ref 5–8)
PLATELET # BLD AUTO: 170 K/UL — SIGNIFICANT CHANGE UP (ref 130–400)
PMV BLD: 9.6 FL — SIGNIFICANT CHANGE UP (ref 7.4–10.4)
POTASSIUM SERPL-MCNC: 4.2 MMOL/L — SIGNIFICANT CHANGE UP (ref 3.5–5)
POTASSIUM SERPL-SCNC: 4.2 MMOL/L — SIGNIFICANT CHANGE UP (ref 3.5–5)
PROT SERPL-MCNC: 5.6 G/DL — LOW (ref 6–8)
PROT UR-MCNC: 30 MG/DL
RBC # BLD: 4.19 M/UL — LOW (ref 4.7–6.1)
RBC # FLD: 15.2 % — HIGH (ref 11.5–14.5)
RBC CASTS # UR COMP ASSIST: >1900 /HPF — HIGH (ref 0–4)
SODIUM SERPL-SCNC: 136 MMOL/L — SIGNIFICANT CHANGE UP (ref 135–146)
SP GR SPEC: 1.02 — SIGNIFICANT CHANGE UP (ref 1–1.03)
SQUAMOUS # UR AUTO: 6 /HPF — HIGH (ref 0–5)
UROBILINOGEN FLD QL: 0.2 MG/DL — SIGNIFICANT CHANGE UP (ref 0.2–1)
WBC # BLD: 7.82 K/UL — SIGNIFICANT CHANGE UP (ref 4.8–10.8)
WBC # FLD AUTO: 7.82 K/UL — SIGNIFICANT CHANGE UP (ref 4.8–10.8)
WBC UR QL: >998 /HPF — HIGH (ref 0–5)
YEAST-LIKE CELLS: PRESENT

## 2024-11-10 PROCEDURE — G0378: CPT

## 2024-11-10 PROCEDURE — 99284 EMERGENCY DEPT VISIT MOD MDM: CPT

## 2024-11-10 PROCEDURE — 71045 X-RAY EXAM CHEST 1 VIEW: CPT | Mod: 26

## 2024-11-10 RX ORDER — CEFPODOXIME PROXETIL 200 MG/1
10 TABLET, FILM COATED ORAL
Qty: 3 | Refills: 0
Start: 2024-11-10 | End: 2024-11-23

## 2024-11-10 RX ORDER — CEFTRIAXONE SODIUM 10 G
1000 VIAL (EA) INJECTION ONCE
Refills: 0 | Status: COMPLETED | OUTPATIENT
Start: 2024-11-10 | End: 2024-11-10

## 2024-11-10 RX ADMIN — Medication 100 MILLIGRAM(S): at 10:21

## 2024-11-10 NOTE — ED ADULT NURSE NOTE - NSFALLHARMRISKINTERV_ED_ALL_ED

## 2024-11-10 NOTE — ED PROVIDER NOTE - CLINICAL SUMMARY MEDICAL DECISION MAKING FREE TEXT BOX
.    89-year-old male, PMH HTN, HLD, A-fib on Eliquis, indwelling Silva catheter, p/w painless hematuria.  No dysuria, fever, abdominal pain, or flank pain.  No trauma.  Patient also notes some shortness of breath.    Exam as noted above.  + Silva catheter in place draining dark red urine, + balanitis at glans.    All available lab tests, imaging tests, and EKGs independently reviewed and interpreted by me, Jonatan Vega.  UA consistent with UTI.  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  Suspect this to be the etiology of patient's intermittent shortness of breath.    Patient received antibiotics in the emerged part.  During ED course patient had free flow of urine that remained hematuric.    Had a long discussion with patient and family regarding admission versus discharge.  ED team offered admission for concern of  complicated UTI and hematuria in the setting of NOAC.  Patient understood these concerns as well as concerns for worsening symptoms, hospitalization, disability and even death.  Patient prefers outpatient antibiotics and follow-up with PMD.  Patient welcome back to ED at any time.  Patient encouraged to have expeditious PMD follow-up.    Impression hematuria, balanitis, UTI.  Patient discharged.    .

## 2024-11-10 NOTE — ED PROVIDER NOTE - CARE PROVIDERS DIRECT ADDRESSES
[email or web address]

## 2024-11-10 NOTE — ED ADULT NURSE NOTE - CHIEF COMPLAINT QUOTE
Pt present for blood in urine started this morning. Pt had  vega catheter replaced 11/6 [facility] urologist. Denies any urinary symptoms  or penile pain  Hx prostate cancer. Pt also c/o SOB when moving around

## 2024-11-10 NOTE — ED PROVIDER NOTE - NSFOLLOWUPINSTRUCTIONS_ED_ALL_ED_FT
You were offered admission to the hospital for hemorrhagic cystitis but opted to leave the hospital.  You were discharged with liquid antibiotic cefpodoxime which she will take 10 mg twice a day for the next 14 days.  He will follow-up with your urologist as an outpatient.  Return precautions were discussed return to the hospital if you begin to experience fever, nausea or vomiting and you are unable to take the medication, worsening weakness, back pain or flank pain, if the Silva catheter stops working.    Urinary Tract Infection    A urinary tract infection (UTI) is an infection of any part of the urinary tract, which includes the kidneys, ureters, bladder, and urethra. Risk factors include ignoring your need to urinate, wiping back to front if female, being an uncircumcised male, and having diabetes or a weak immune system. Symptoms include frequent urination, pain or burning with urination, foul smelling urine, cloudy urine, pain in the lower abdomen, blood in the urine, and fever. If you were prescribed an antibiotic medicine, take it as told by your health care provider. Do not stop taking the antibiotic even if you start to feel better.    SEEK IMMEDIATE MEDICAL CARE IF YOU HAVE ANY OF THE FOLLOWING SYMPTOMS: severe back or abdominal pain, fever, inability to keep fluids or medicine down, dizziness/lightheadedness, or a change in mental status. You were offered admission to the hospital for hemorrhagic cystitis but opted to leave the hospital. You understand the risk of your decision up to and including death and disability. You were discharged with liquid antibiotic cefpodoxime which she will take 10 mg twice a day for the next 14 days.  You will follow-up with your urologist as an outpatient.  Return precautions were discussed return to the hospital if you begin to experience fever, nausea or vomiting and you are unable to take the medication, worsening weakness, back pain or flank pain, if the Silva catheter stops working.    Urinary Tract Infection    A urinary tract infection (UTI) is an infection of any part of the urinary tract, which includes the kidneys, ureters, bladder, and urethra. Risk factors include ignoring your need to urinate, wiping back to front if female, being an uncircumcised male, and having diabetes or a weak immune system. Symptoms include frequent urination, pain or burning with urination, foul smelling urine, cloudy urine, pain in the lower abdomen, blood in the urine, and fever. If you were prescribed an antibiotic medicine, take it as told by your health care provider. Do not stop taking the antibiotic even if you start to feel better.    SEEK IMMEDIATE MEDICAL CARE IF YOU HAVE ANY OF THE FOLLOWING SYMPTOMS: severe back or abdominal pain, fever, inability to keep fluids or medicine down, dizziness/lightheadedness, or a change in mental status.

## 2024-11-10 NOTE — ED PROVIDER NOTE - OBJECTIVE STATEMENT
89-year-old male past medical history of CAD status post CABG, prostate cancer, hypertension, hyperlipidemia, hypothyroidism, A-fib on Eliquis presents to the ED for evaluation of bloody urine.  Patient had Silva catheter exchange 5 days ago this morning noticed some bloody output into Silva bag.  Endorsing associated generalized weakness and some shortness of breath.  Denies any chest pain, cough

## 2024-11-10 NOTE — ED PROVIDER NOTE - IV ALTEPLASE EXCL ABS HIDDEN
show

## 2024-11-10 NOTE — ED PROVIDER NOTE - PATIENT PORTAL LINK FT
You can access the FollowMyHealth Patient Portal offered by [facility] by registering at the following website: [email or web address]. By joining [employer]’s FollowMyHealth portal, you will also be able to view your health information using other applications (apps) compatible with our system.

## 2024-11-10 NOTE — ED PROVIDER NOTE - CARE PROVIDER_API CALL
Michele Carlisle  Urology  [address] [place] [id number]  Phone: [phone]  Fax: [phone]  Follow Up Time: 1 week

## 2024-11-10 NOTE — ED ADULT NURSE NOTE - OBJECTIVE STATEMENT
Patient presents to ED for c/o hematuria that began this morning. Patient presents with vega cathter connected to right leg bag. Patient had  vega catheter replaced 11/6 by urologist. Denies any urinary symptoms  or penile pain  Hx prostate cancer.

## 2024-11-10 NOTE — ED PROVIDER NOTE - PROGRESS NOTE DETAILS
Offered patient mission however patient would prefer to be discharged at this time with oral antibiotics and follow-up with urology as outpatient.  Patient ANO x 3 aware the risk and benefits of incisions.  Return precautions discussed.  Patient and family given an opportunity to ask questions. Offered patient admission however patient would prefer to be discharged at this time with oral antibiotics and follow-up with urology as outpatient.  Patient ANO x 3 aware the risk and benefits of decisions including death and disability. Return precautions discussed.  Patient and family given an opportunity to ask questions.

## 2024-11-10 NOTE — ED ADULT TRIAGE NOTE - CHIEF COMPLAINT QUOTE
Pt present for blood in urine started this morning. Pt had  vega catheter replaced 11/6 [facility] urologist. Denies any urinary symptoms  or penile pain  Hx prostate cancer Pt present for blood in urine started this morning. Pt had  vega catheter replaced 11/6 [facility] urologist. Denies any urinary symptoms  or penile pain  Hx prostate cancer. Pt also c/o SOB when moving around

## 2024-11-10 NOTE — ED PROVIDER NOTE - PHYSICAL EXAMINATION
CONST: Well appearing in NAD  EYES: PERRL, EOMI, Sclera and conjunctiva clear.   ENT: Oropharynx normal appearing, no erythema or exudates. Uvula midline.  CARD: Normal S1 S2; Normal rate and rhythm  RESP: Equal BS B/L, No wheezes, rhonchi or rales. No distress  GI: Soft, non-tender, non-distended.  : Uncircumcised, balanitis noted to head of penis  MS: Normal ROM in all extremities. No midline spinal tenderness.  SKIN: Warm, dry, no acute rashes.   NEURO: A&Ox3, No focal deficits. Strength 5/5 with no sensory deficits. Steady gait

## 2024-11-15 ENCOUNTER — APPOINTMENT (OUTPATIENT)
Dept: PAIN MANAGEMENT | Facility: CLINIC | Age: 89
End: 2024-11-15

## 2024-11-15 LAB
CULTURE RESULTS: SIGNIFICANT CHANGE UP
CULTURE RESULTS: SIGNIFICANT CHANGE UP
SPECIMEN SOURCE: SIGNIFICANT CHANGE UP
SPECIMEN SOURCE: SIGNIFICANT CHANGE UP

## 2024-11-17 DIAGNOSIS — N39.0 URINARY TRACT INFECTION, SITE NOT SPECIFIED: ICD-10-CM

## 2024-11-17 DIAGNOSIS — E78.5 HYPERLIPIDEMIA, UNSPECIFIED: ICD-10-CM

## 2024-11-17 DIAGNOSIS — I48.91 UNSPECIFIED ATRIAL FIBRILLATION: ICD-10-CM

## 2024-11-17 DIAGNOSIS — E03.9 HYPOTHYROIDISM, UNSPECIFIED: ICD-10-CM

## 2024-11-17 DIAGNOSIS — Z79.01 LONG TERM (CURRENT) USE OF ANTICOAGULANTS: ICD-10-CM

## 2024-11-17 DIAGNOSIS — I10 ESSENTIAL (PRIMARY) HYPERTENSION: ICD-10-CM

## 2024-11-17 DIAGNOSIS — Z79.82 LONG TERM (CURRENT) USE OF ASPIRIN: ICD-10-CM

## 2024-11-17 DIAGNOSIS — C61 MALIGNANT NEOPLASM OF PROSTATE: ICD-10-CM

## 2024-11-17 DIAGNOSIS — Z95.1 PRESENCE OF AORTOCORONARY BYPASS GRAFT: ICD-10-CM

## 2024-11-17 DIAGNOSIS — R31.9 HEMATURIA, UNSPECIFIED: ICD-10-CM

## 2024-11-17 DIAGNOSIS — Z79.899 OTHER LONG TERM (CURRENT) DRUG THERAPY: ICD-10-CM

## 2024-11-17 DIAGNOSIS — I25.10 ATHEROSCLEROTIC HEART DISEASE OF NATIVE CORONARY ARTERY WITHOUT ANGINA PECTORIS: ICD-10-CM

## 2024-11-17 DIAGNOSIS — Z79.890 HORMONE REPLACEMENT THERAPY: ICD-10-CM

## 2024-11-17 DIAGNOSIS — N48.1 BALANITIS: ICD-10-CM

## 2024-12-05 ENCOUNTER — APPOINTMENT (OUTPATIENT)
Dept: UROLOGY | Facility: CLINIC | Age: 88
End: 2024-12-05

## 2024-12-10 ENCOUNTER — APPOINTMENT (OUTPATIENT)
Dept: UROLOGY | Facility: CLINIC | Age: 88
End: 2024-12-10
Payer: MEDICARE

## 2024-12-10 PROCEDURE — 51702 INSERT TEMP BLADDER CATH: CPT

## 2024-12-13 LAB — URINE CULTURE <10: ABNORMAL

## 2024-12-17 ENCOUNTER — LABORATORY RESULT (OUTPATIENT)
Age: 88
End: 2024-12-17

## 2024-12-17 ENCOUNTER — OUTPATIENT (OUTPATIENT)
Dept: OUTPATIENT SERVICES | Facility: HOSPITAL | Age: 88
LOS: 1 days | End: 2024-12-17
Payer: MEDICARE

## 2024-12-17 ENCOUNTER — APPOINTMENT (OUTPATIENT)
Age: 88
End: 2024-12-17
Payer: MEDICARE

## 2024-12-17 VITALS
RESPIRATION RATE: 14 BRPM | BODY MASS INDEX: 29.49 KG/M2 | OXYGEN SATURATION: 100 % | DIASTOLIC BLOOD PRESSURE: 61 MMHG | TEMPERATURE: 97.9 F | WEIGHT: 177 LBS | HEIGHT: 65 IN | HEART RATE: 106 BPM | SYSTOLIC BLOOD PRESSURE: 105 MMHG

## 2024-12-17 DIAGNOSIS — K66.8 OTHER SPECIFIED DISORDERS OF PERITONEUM: ICD-10-CM

## 2024-12-17 DIAGNOSIS — Z98.49 CATARACT EXTRACTION STATUS, UNSPECIFIED EYE: Chronic | ICD-10-CM

## 2024-12-17 DIAGNOSIS — Z95.1 PRESENCE OF AORTOCORONARY BYPASS GRAFT: Chronic | ICD-10-CM

## 2024-12-17 DIAGNOSIS — Z51.11 ENCOUNTER FOR ANTINEOPLASTIC CHEMOTHERAPY: ICD-10-CM

## 2024-12-17 DIAGNOSIS — C61 MALIGNANT NEOPLASM OF PROSTATE: ICD-10-CM

## 2024-12-17 LAB
HCT VFR BLD CALC: 35.1 %
HGB BLD-MCNC: 11.5 G/DL
MCHC RBC-ENTMCNC: 29 PG
MCHC RBC-ENTMCNC: 32.8 G/DL
MCV RBC AUTO: 88.6 FL
PLATELET # BLD AUTO: 198 K/UL
PMV BLD: 9.3 FL
RBC # BLD: 3.96 M/UL
RBC # FLD: 16.5 %
WBC # FLD AUTO: 8.31 K/UL

## 2024-12-17 PROCEDURE — G2211 COMPLEX E/M VISIT ADD ON: CPT

## 2024-12-17 PROCEDURE — 99214 OFFICE O/P EST MOD 30 MIN: CPT

## 2024-12-17 PROCEDURE — 84403 ASSAY OF TOTAL TESTOSTERONE: CPT

## 2024-12-17 PROCEDURE — 84153 ASSAY OF PSA TOTAL: CPT

## 2024-12-17 PROCEDURE — 85027 COMPLETE CBC AUTOMATED: CPT

## 2024-12-17 PROCEDURE — 80053 COMPREHEN METABOLIC PANEL: CPT

## 2024-12-18 DIAGNOSIS — K66.8 OTHER SPECIFIED DISORDERS OF PERITONEUM: ICD-10-CM

## 2024-12-18 LAB
ALBUMIN SERPL ELPH-MCNC: 3.2 G/DL
ALP BLD-CCNC: 90 U/L
ALT SERPL-CCNC: 13 U/L
ANION GAP SERPL CALC-SCNC: 13 MMOL/L
AST SERPL-CCNC: 19 U/L
BILIRUB SERPL-MCNC: 0.6 MG/DL
BUN SERPL-MCNC: 25 MG/DL
CALCIUM SERPL-MCNC: 8.6 MG/DL
CHLORIDE SERPL-SCNC: 107 MMOL/L
CO2 SERPL-SCNC: 21 MMOL/L
CREAT SERPL-MCNC: 1.6 MG/DL
EGFR: 40 ML/MIN/1.73M2
GLUCOSE SERPL-MCNC: 208 MG/DL
POTASSIUM SERPL-SCNC: 4.5 MMOL/L
PROT SERPL-MCNC: 5.9 G/DL
SODIUM SERPL-SCNC: 141 MMOL/L

## 2024-12-19 ENCOUNTER — NON-APPOINTMENT (OUTPATIENT)
Age: 88
End: 2024-12-19

## 2024-12-19 LAB
PSA SERPL-MCNC: 3.32 NG/ML
TESTOST SERPL-MCNC: <2.5 NG/DL

## 2025-01-09 ENCOUNTER — APPOINTMENT (OUTPATIENT)
Dept: UROLOGY | Facility: CLINIC | Age: 89
End: 2025-01-09
Payer: MEDICARE

## 2025-01-09 PROCEDURE — 51702 INSERT TEMP BLADDER CATH: CPT

## 2025-02-06 ENCOUNTER — APPOINTMENT (OUTPATIENT)
Dept: UROLOGY | Facility: CLINIC | Age: 89
End: 2025-02-06

## 2025-02-11 ENCOUNTER — NON-APPOINTMENT (OUTPATIENT)
Age: 89
End: 2025-02-11

## 2025-02-13 ENCOUNTER — APPOINTMENT (OUTPATIENT)
Dept: UROLOGY | Facility: CLINIC | Age: 89
End: 2025-02-13
Payer: MEDICARE

## 2025-02-13 VITALS
DIASTOLIC BLOOD PRESSURE: 73 MMHG | OXYGEN SATURATION: 97 % | HEART RATE: 105 BPM | SYSTOLIC BLOOD PRESSURE: 106 MMHG | TEMPERATURE: 97.3 F | RESPIRATION RATE: 14 BRPM

## 2025-02-13 DIAGNOSIS — N39.0 URINARY TRACT INFECTION, SITE NOT SPECIFIED: ICD-10-CM

## 2025-02-13 DIAGNOSIS — C61 MALIGNANT NEOPLASM OF PROSTATE: ICD-10-CM

## 2025-02-13 DIAGNOSIS — R97.21 RISING PSA FOLLOWING TREATMENT FOR MALIGNANT NEOPLASM OF PROSTATE: ICD-10-CM

## 2025-02-13 DIAGNOSIS — N31.2 FLACCID NEUROPATHIC BLADDER, NOT ELSEWHERE CLASSIFIED: ICD-10-CM

## 2025-02-13 PROCEDURE — 99214 OFFICE O/P EST MOD 30 MIN: CPT

## 2025-02-27 ENCOUNTER — APPOINTMENT (OUTPATIENT)
Dept: UROLOGY | Facility: CLINIC | Age: 89
End: 2025-02-27

## 2025-03-10 ENCOUNTER — APPOINTMENT (OUTPATIENT)
Age: 89
End: 2025-03-10
Payer: MEDICARE

## 2025-03-10 ENCOUNTER — OUTPATIENT (OUTPATIENT)
Dept: OUTPATIENT SERVICES | Facility: HOSPITAL | Age: 89
LOS: 1 days | End: 2025-03-10
Payer: MEDICARE

## 2025-03-10 VITALS
OXYGEN SATURATION: 98 % | TEMPERATURE: 98.1 F | SYSTOLIC BLOOD PRESSURE: 105 MMHG | DIASTOLIC BLOOD PRESSURE: 72 MMHG | RESPIRATION RATE: 14 BRPM | HEIGHT: 65 IN | HEART RATE: 88 BPM | WEIGHT: 175 LBS | BODY MASS INDEX: 29.16 KG/M2

## 2025-03-10 DIAGNOSIS — Z95.1 PRESENCE OF AORTOCORONARY BYPASS GRAFT: Chronic | ICD-10-CM

## 2025-03-10 DIAGNOSIS — C61 MALIGNANT NEOPLASM OF PROSTATE: ICD-10-CM

## 2025-03-10 DIAGNOSIS — Z98.49 CATARACT EXTRACTION STATUS, UNSPECIFIED EYE: Chronic | ICD-10-CM

## 2025-03-10 DIAGNOSIS — K66.8 OTHER SPECIFIED DISORDERS OF PERITONEUM: ICD-10-CM

## 2025-03-10 DIAGNOSIS — Z51.11 ENCOUNTER FOR ANTINEOPLASTIC CHEMOTHERAPY: ICD-10-CM

## 2025-03-10 LAB
AUTO BASOPHILS #: 0.05 K/UL
AUTO BASOPHILS %: 0.5 %
AUTO EOSINOPHILS #: 0.06 K/UL
AUTO EOSINOPHILS %: 0.6 %
AUTO IMMATURE GRANULOCYTES #: 0.05 K/UL
AUTO LYMPHOCYTES #: 0.72 K/UL
AUTO LYMPHOCYTES %: 7.1 %
AUTO MONOCYTES #: 0.43 K/UL
AUTO MONOCYTES %: 4.2 %
AUTO NEUTROPHILS #: 8.9 K/UL
AUTO NEUTROPHILS %: 87.1 %
AUTO NRBC #: 0 K/UL
HCT VFR BLD CALC: 37.7 %
HGB BLD-MCNC: 11.8 G/DL
IMM GRANULOCYTES NFR BLD AUTO: 0.5 %
MAN DIFF?: NORMAL
MCHC RBC-ENTMCNC: 28 PG
MCHC RBC-ENTMCNC: 31.3 G/DL
MCV RBC AUTO: 89.3 FL
PLATELET # BLD AUTO: 204 K/UL
PMV BLD AUTO: 0 /100 WBCS
PMV BLD: 9 FL
RBC # BLD: 4.22 M/UL
RBC # FLD: 16.9 %
WBC # FLD AUTO: 10.21 K/UL

## 2025-03-10 PROCEDURE — 99214 OFFICE O/P EST MOD 30 MIN: CPT

## 2025-03-10 PROCEDURE — 85025 COMPLETE CBC W/AUTO DIFF WBC: CPT

## 2025-03-10 PROCEDURE — 84153 ASSAY OF PSA TOTAL: CPT

## 2025-03-10 PROCEDURE — 80053 COMPREHEN METABOLIC PANEL: CPT

## 2025-03-10 PROCEDURE — G2211 COMPLEX E/M VISIT ADD ON: CPT

## 2025-03-10 PROCEDURE — 84403 ASSAY OF TOTAL TESTOSTERONE: CPT

## 2025-03-10 RX ORDER — PREDNISONE 5 MG/1
5 TABLET ORAL DAILY
Qty: 30 | Refills: 3 | Status: ACTIVE | COMMUNITY
Start: 2025-03-10 | End: 1900-01-01

## 2025-03-11 DIAGNOSIS — K66.8 OTHER SPECIFIED DISORDERS OF PERITONEUM: ICD-10-CM

## 2025-03-11 LAB
ALBUMIN SERPL ELPH-MCNC: 3.2 G/DL
ALP BLD-CCNC: 114 U/L
ALT SERPL-CCNC: 15 U/L
ANION GAP SERPL CALC-SCNC: 16 MMOL/L
AST SERPL-CCNC: 18 U/L
BILIRUB SERPL-MCNC: 0.5 MG/DL
BUN SERPL-MCNC: 20 MG/DL
CALCIUM SERPL-MCNC: 8.7 MG/DL
CHLORIDE SERPL-SCNC: 103 MMOL/L
CO2 SERPL-SCNC: 21 MMOL/L
CREAT SERPL-MCNC: 1.5 MG/DL
EGFRCR SERPLBLD CKD-EPI 2021: 43 ML/MIN/1.73M2
GLUCOSE SERPL-MCNC: 210 MG/DL
POTASSIUM SERPL-SCNC: 4.5 MMOL/L
PROT SERPL-MCNC: 5.9 G/DL
PSA SERPL-MCNC: 2.94 NG/ML
SODIUM SERPL-SCNC: 140 MMOL/L
TESTOST SERPL-MCNC: <2.5 NG/DL

## 2025-03-12 ENCOUNTER — NON-APPOINTMENT (OUTPATIENT)
Age: 89
End: 2025-03-12

## 2025-03-20 ENCOUNTER — APPOINTMENT (OUTPATIENT)
Dept: UROLOGY | Facility: CLINIC | Age: 89
End: 2025-03-20
Payer: MEDICARE

## 2025-03-20 DIAGNOSIS — R33.9 RETENTION OF URINE, UNSPECIFIED: ICD-10-CM

## 2025-03-20 PROCEDURE — 51702 INSERT TEMP BLADDER CATH: CPT

## 2025-03-26 LAB — URINE CULTURE <10: NORMAL

## 2025-04-17 ENCOUNTER — APPOINTMENT (OUTPATIENT)
Dept: UROLOGY | Facility: CLINIC | Age: 89
End: 2025-04-17
Payer: MEDICARE

## 2025-04-17 PROCEDURE — 51702 INSERT TEMP BLADDER CATH: CPT

## 2025-04-22 LAB — URINE CULTURE <10: NORMAL

## 2025-05-02 ENCOUNTER — APPOINTMENT (OUTPATIENT)
Dept: PAIN MANAGEMENT | Facility: CLINIC | Age: 89
End: 2025-05-02
Payer: MEDICARE

## 2025-05-02 DIAGNOSIS — M25.551 PAIN IN RIGHT HIP: ICD-10-CM

## 2025-05-02 PROCEDURE — 99214 OFFICE O/P EST MOD 30 MIN: CPT

## 2025-05-08 ENCOUNTER — APPOINTMENT (OUTPATIENT)
Dept: PAIN MANAGEMENT | Facility: CLINIC | Age: 89
End: 2025-05-08
Payer: MEDICARE

## 2025-05-08 DIAGNOSIS — M16.11 UNILATERAL PRIMARY OSTEOARTHRITIS, RIGHT HIP: ICD-10-CM

## 2025-05-08 PROCEDURE — 20610 DRAIN/INJ JOINT/BURSA W/O US: CPT | Mod: RT

## 2025-05-08 PROCEDURE — 77002 NEEDLE LOCALIZATION BY XRAY: CPT | Mod: 79

## 2025-05-22 ENCOUNTER — APPOINTMENT (OUTPATIENT)
Dept: UROLOGY | Facility: CLINIC | Age: 89
End: 2025-05-22
Payer: MEDICARE

## 2025-05-22 ENCOUNTER — APPOINTMENT (OUTPATIENT)
Dept: PAIN MANAGEMENT | Facility: CLINIC | Age: 89
End: 2025-05-22
Payer: MEDICARE

## 2025-05-22 DIAGNOSIS — M16.11 UNILATERAL PRIMARY OSTEOARTHRITIS, RIGHT HIP: ICD-10-CM

## 2025-05-22 PROCEDURE — 51702 INSERT TEMP BLADDER CATH: CPT

## 2025-05-22 PROCEDURE — 99213 OFFICE O/P EST LOW 20 MIN: CPT

## 2025-05-22 PROCEDURE — 96402 CHEMO HORMON ANTINEOPL SQ/IM: CPT

## 2025-06-02 ENCOUNTER — OUTPATIENT (OUTPATIENT)
Dept: OUTPATIENT SERVICES | Facility: HOSPITAL | Age: 89
LOS: 1 days | End: 2025-06-02
Payer: MEDICARE

## 2025-06-02 ENCOUNTER — APPOINTMENT (OUTPATIENT)
Age: 89
End: 2025-06-02
Payer: MEDICARE

## 2025-06-02 VITALS
HEIGHT: 65 IN | BODY MASS INDEX: 28.82 KG/M2 | DIASTOLIC BLOOD PRESSURE: 69 MMHG | OXYGEN SATURATION: 97 % | SYSTOLIC BLOOD PRESSURE: 111 MMHG | TEMPERATURE: 97.4 F | WEIGHT: 173 LBS | HEART RATE: 69 BPM

## 2025-06-02 DIAGNOSIS — Z51.11 ENCOUNTER FOR ANTINEOPLASTIC CHEMOTHERAPY: ICD-10-CM

## 2025-06-02 DIAGNOSIS — C61 MALIGNANT NEOPLASM OF PROSTATE: ICD-10-CM

## 2025-06-02 DIAGNOSIS — Z95.1 PRESENCE OF AORTOCORONARY BYPASS GRAFT: Chronic | ICD-10-CM

## 2025-06-02 DIAGNOSIS — Z98.49 CATARACT EXTRACTION STATUS, UNSPECIFIED EYE: Chronic | ICD-10-CM

## 2025-06-02 DIAGNOSIS — K66.8 OTHER SPECIFIED DISORDERS OF PERITONEUM: ICD-10-CM

## 2025-06-02 LAB
AUTO BASOPHILS #: 0.05 K/UL
AUTO BASOPHILS %: 0.7 %
AUTO EOSINOPHILS #: 0.24 K/UL
AUTO EOSINOPHILS %: 3.4 %
AUTO IMMATURE GRANULOCYTES #: 0.06 K/UL
AUTO LYMPHOCYTES #: 0.67 K/UL
AUTO LYMPHOCYTES %: 9.5 %
AUTO MONOCYTES #: 0.54 K/UL
AUTO MONOCYTES %: 7.6 %
AUTO NEUTROPHILS #: 5.52 K/UL
AUTO NEUTROPHILS %: 78 %
AUTO NRBC #: 0 K/UL
HCT VFR BLD CALC: 35.5 %
HGB BLD-MCNC: 11.5 G/DL
IMM GRANULOCYTES NFR BLD AUTO: 0.8 %
MAN DIFF?: NORMAL
MCHC RBC-ENTMCNC: 29.4 PG
MCHC RBC-ENTMCNC: 32.4 G/DL
MCV RBC AUTO: 90.8 FL
PLATELET # BLD AUTO: 183 K/UL
PMV BLD AUTO: 0 /100 WBCS
PMV BLD: 8.7 FL
RBC # BLD: 3.91 M/UL
RBC # FLD: 16.5 %
WBC # FLD AUTO: 7.08 K/UL

## 2025-06-02 PROCEDURE — 85025 COMPLETE CBC W/AUTO DIFF WBC: CPT

## 2025-06-02 PROCEDURE — 84153 ASSAY OF PSA TOTAL: CPT

## 2025-06-02 PROCEDURE — 99214 OFFICE O/P EST MOD 30 MIN: CPT

## 2025-06-02 PROCEDURE — G2211 COMPLEX E/M VISIT ADD ON: CPT

## 2025-06-02 PROCEDURE — 80053 COMPREHEN METABOLIC PANEL: CPT

## 2025-06-02 PROCEDURE — 84403 ASSAY OF TOTAL TESTOSTERONE: CPT

## 2025-06-03 DIAGNOSIS — K66.8 OTHER SPECIFIED DISORDERS OF PERITONEUM: ICD-10-CM

## 2025-06-03 LAB
ALBUMIN SERPL ELPH-MCNC: 3.3 G/DL
ALP BLD-CCNC: 86 U/L
ALT SERPL-CCNC: 14 U/L
ANION GAP SERPL CALC-SCNC: 12 MMOL/L
AST SERPL-CCNC: 16 U/L
BILIRUB SERPL-MCNC: 0.4 MG/DL
BUN SERPL-MCNC: 25 MG/DL
CALCIUM SERPL-MCNC: 8.9 MG/DL
CHLORIDE SERPL-SCNC: 106 MMOL/L
CO2 SERPL-SCNC: 24 MMOL/L
CREAT SERPL-MCNC: 1.5 MG/DL
EGFRCR SERPLBLD CKD-EPI 2021: 43 ML/MIN/1.73M2
GLUCOSE SERPL-MCNC: 121 MG/DL
POTASSIUM SERPL-SCNC: 4.7 MMOL/L
PROT SERPL-MCNC: 5.8 G/DL
PSA SERPL-MCNC: 2.95 NG/ML
SODIUM SERPL-SCNC: 142 MMOL/L
TESTOST SERPL-MCNC: <2.5 NG/DL

## 2025-06-19 ENCOUNTER — APPOINTMENT (OUTPATIENT)
Dept: UROLOGY | Facility: CLINIC | Age: 89
End: 2025-06-19

## 2025-07-01 ENCOUNTER — APPOINTMENT (OUTPATIENT)
Dept: UROLOGY | Facility: CLINIC | Age: 89
End: 2025-07-01
Payer: MEDICARE

## 2025-07-01 VITALS
TEMPERATURE: 98.1 F | WEIGHT: 175 LBS | HEIGHT: 66 IN | SYSTOLIC BLOOD PRESSURE: 125 MMHG | BODY MASS INDEX: 28.12 KG/M2 | DIASTOLIC BLOOD PRESSURE: 82 MMHG | RESPIRATION RATE: 15 BRPM | HEART RATE: 72 BPM | OXYGEN SATURATION: 95 %

## 2025-07-01 PROCEDURE — 51702 INSERT TEMP BLADDER CATH: CPT

## 2025-07-08 LAB — URINE CULTURE <10: ABNORMAL

## 2025-08-05 ENCOUNTER — APPOINTMENT (OUTPATIENT)
Dept: UROLOGY | Facility: CLINIC | Age: 89
End: 2025-08-05
Payer: MEDICARE

## 2025-08-05 DIAGNOSIS — N31.2 FLACCID NEUROPATHIC BLADDER, NOT ELSEWHERE CLASSIFIED: ICD-10-CM

## 2025-08-05 DIAGNOSIS — C61 MALIGNANT NEOPLASM OF PROSTATE: ICD-10-CM

## 2025-08-05 PROCEDURE — 96402 CHEMO HORMON ANTINEOPL SQ/IM: CPT

## 2025-08-05 PROCEDURE — 51702 INSERT TEMP BLADDER CATH: CPT

## 2025-08-21 ENCOUNTER — APPOINTMENT (OUTPATIENT)
Dept: PAIN MANAGEMENT | Facility: CLINIC | Age: 89
End: 2025-08-21
Payer: MEDICARE

## 2025-08-21 PROCEDURE — 99214 OFFICE O/P EST MOD 30 MIN: CPT

## 2025-08-28 ENCOUNTER — APPOINTMENT (OUTPATIENT)
Dept: PAIN MANAGEMENT | Facility: CLINIC | Age: 89
End: 2025-08-28
Payer: MEDICARE

## 2025-08-28 DIAGNOSIS — M25.551 PAIN IN RIGHT HIP: ICD-10-CM

## 2025-08-28 PROCEDURE — 20610 DRAIN/INJ JOINT/BURSA W/O US: CPT | Mod: RT

## 2025-09-04 ENCOUNTER — APPOINTMENT (OUTPATIENT)
Dept: PAIN MANAGEMENT | Facility: CLINIC | Age: 89
End: 2025-09-04
Payer: MEDICARE

## 2025-09-04 DIAGNOSIS — M17.12 UNILATERAL PRIMARY OSTEOARTHRITIS, LEFT KNEE: ICD-10-CM

## 2025-09-04 DIAGNOSIS — M16.11 UNILATERAL PRIMARY OSTEOARTHRITIS, RIGHT HIP: ICD-10-CM

## 2025-09-04 PROBLEM — M16.12 PRIMARY LOCALIZED OSTEOARTHRITIS OF LEFT HIP: Status: ACTIVE | Noted: 2025-09-04

## 2025-09-04 PROCEDURE — 99214 OFFICE O/P EST MOD 30 MIN: CPT

## 2025-09-08 ENCOUNTER — APPOINTMENT (OUTPATIENT)
Age: 89
End: 2025-09-08
Payer: MEDICARE

## 2025-09-08 DIAGNOSIS — C61 MALIGNANT NEOPLASM OF PROSTATE: ICD-10-CM

## 2025-09-08 DIAGNOSIS — Z51.11 ENCOUNTER FOR ANTINEOPLASTIC CHEMOTHERAPY: ICD-10-CM

## 2025-09-08 LAB
ALBUMIN SERPL ELPH-MCNC: 3.7 G/DL
ALP BLD-CCNC: 86 U/L
ALT SERPL-CCNC: 17 U/L
ANION GAP SERPL CALC-SCNC: 16 MMOL/L
AST SERPL-CCNC: 19 U/L
AUTO BASOPHILS #: 0.03 K/UL
AUTO BASOPHILS %: 0.3 %
AUTO EOSINOPHILS #: 0.11 K/UL
AUTO EOSINOPHILS %: 1.1 %
AUTO IMMATURE GRANULOCYTES #: 0.05 K/UL
AUTO LYMPHOCYTES #: 0.73 K/UL
AUTO LYMPHOCYTES %: 7.5 %
AUTO MONOCYTES #: 0.49 K/UL
AUTO MONOCYTES %: 5 %
AUTO NEUTROPHILS #: 8.33 K/UL
AUTO NEUTROPHILS %: 85.6 %
AUTO NRBC #: 0 K/UL
BILIRUB SERPL-MCNC: 0.7 MG/DL
BUN SERPL-MCNC: 27 MG/DL
CALCIUM SERPL-MCNC: 8.8 MG/DL
CHLORIDE SERPL-SCNC: 107 MMOL/L
CO2 SERPL-SCNC: 20 MMOL/L
CREAT SERPL-MCNC: 1.5 MG/DL
EGFRCR SERPLBLD CKD-EPI 2021: 43 ML/MIN/1.73M2
GLUCOSE SERPL-MCNC: 117 MG/DL
HCT VFR BLD CALC: 35.9 %
HGB BLD-MCNC: 12 G/DL
IMM GRANULOCYTES NFR BLD AUTO: 0.5 %
MAN DIFF?: NORMAL
MCHC RBC-ENTMCNC: 29.9 PG
MCHC RBC-ENTMCNC: 33.4 G/DL
MCV RBC AUTO: 89.5 FL
PLATELET # BLD AUTO: 158 K/UL
PMV BLD AUTO: 0 /100 WBCS
PMV BLD: 9.2 FL
POTASSIUM SERPL-SCNC: 4.1 MMOL/L
PROT SERPL-MCNC: 6.1 G/DL
RBC # BLD: 4.01 M/UL
RBC # FLD: 15.5 %
SODIUM SERPL-SCNC: 143 MMOL/L
WBC # FLD AUTO: 9.74 K/UL

## 2025-09-08 PROCEDURE — 99214 OFFICE O/P EST MOD 30 MIN: CPT

## 2025-09-08 PROCEDURE — G2211 COMPLEX E/M VISIT ADD ON: CPT

## 2025-09-09 LAB
PSA SERPL-MCNC: 3.67 NG/ML
TESTOST SERPL-MCNC: <2.5 NG/DL

## 2025-09-10 ENCOUNTER — APPOINTMENT (OUTPATIENT)
Dept: PAIN MANAGEMENT | Facility: CLINIC | Age: 89
End: 2025-09-10
Payer: MEDICARE

## 2025-09-10 DIAGNOSIS — M16.12 UNILATERAL PRIMARY OSTEOARTHRITIS, LEFT HIP: ICD-10-CM

## 2025-09-10 PROCEDURE — 20610 DRAIN/INJ JOINT/BURSA W/O US: CPT | Mod: LT

## 2025-09-10 PROCEDURE — 77002 NEEDLE LOCALIZATION BY XRAY: CPT | Mod: 79

## 2025-09-11 ENCOUNTER — APPOINTMENT (OUTPATIENT)
Dept: UROLOGY | Facility: CLINIC | Age: 89
End: 2025-09-11
Payer: MEDICARE

## 2025-09-11 PROCEDURE — 51702 INSERT TEMP BLADDER CATH: CPT

## 2025-09-12 ENCOUNTER — APPOINTMENT (OUTPATIENT)
Dept: PAIN MANAGEMENT | Facility: CLINIC | Age: 89
End: 2025-09-12